# Patient Record
Sex: FEMALE | Race: BLACK OR AFRICAN AMERICAN | NOT HISPANIC OR LATINO | Employment: OTHER | URBAN - METROPOLITAN AREA
[De-identification: names, ages, dates, MRNs, and addresses within clinical notes are randomized per-mention and may not be internally consistent; named-entity substitution may affect disease eponyms.]

---

## 2019-05-23 ENCOUNTER — OFFICE VISIT CONVERTED (OUTPATIENT)
Dept: UROLOGY | Facility: CLINIC | Age: 39
End: 2019-05-23
Attending: UROLOGY

## 2019-05-23 ENCOUNTER — CONVERSION ENCOUNTER (OUTPATIENT)
Dept: UROLOGY | Facility: CLINIC | Age: 39
End: 2019-05-23

## 2019-05-28 ENCOUNTER — HOSPITAL ENCOUNTER (OUTPATIENT)
Dept: SURGERY | Facility: HOSPITAL | Age: 39
Setting detail: HOSPITAL OUTPATIENT SURGERY
Discharge: HOME | End: 2019-05-28
Attending: UROLOGY

## 2019-09-10 ENCOUNTER — OFFICE VISIT CONVERTED (OUTPATIENT)
Dept: UROLOGY | Facility: CLINIC | Age: 39
End: 2019-09-10
Attending: UROLOGY

## 2020-08-17 ENCOUNTER — HOSPITAL ENCOUNTER (OUTPATIENT)
Dept: PHYSICAL THERAPY | Facility: CLINIC | Age: 40
Setting detail: RECURRING SERIES
Discharge: HOME | End: 2020-12-04
Attending: FAMILY MEDICINE

## 2021-01-28 ENCOUNTER — HOSPITAL ENCOUNTER (OUTPATIENT)
Dept: OTHER | Facility: HOSPITAL | Age: 41
Discharge: HOME | End: 2021-01-28
Attending: OBSTETRICS & GYNECOLOGY

## 2021-05-15 VITALS
HEIGHT: 62 IN | TEMPERATURE: 98.5 F | SYSTOLIC BLOOD PRESSURE: 132 MMHG | HEART RATE: 89 BPM | BODY MASS INDEX: 53.92 KG/M2 | WEIGHT: 293 LBS | DIASTOLIC BLOOD PRESSURE: 89 MMHG

## 2021-05-15 VITALS — SYSTOLIC BLOOD PRESSURE: 131 MMHG | DIASTOLIC BLOOD PRESSURE: 71 MMHG | HEART RATE: 100 BPM | TEMPERATURE: 98.3 F

## 2021-08-29 ENCOUNTER — HOSPITAL ENCOUNTER (EMERGENCY)
Facility: HOSPITAL | Age: 41
Discharge: HOME | End: 2021-08-29
Attending: EMERGENCY MEDICINE | Admitting: EMERGENCY MEDICINE

## 2021-08-29 VITALS
BODY MASS INDEX: 51.91 KG/M2 | SYSTOLIC BLOOD PRESSURE: 140 MMHG | HEIGHT: 63 IN | WEIGHT: 293 LBS | RESPIRATION RATE: 19 BRPM | HEART RATE: 103 BPM | OXYGEN SATURATION: 95 % | DIASTOLIC BLOOD PRESSURE: 93 MMHG | TEMPERATURE: 99.6 F

## 2021-08-29 DIAGNOSIS — Z20.822 EXPOSURE TO COVID-19 VIRUS: ICD-10-CM

## 2021-08-29 DIAGNOSIS — Z20.822 SUSPECTED COVID-19 VIRUS INFECTION: Primary | ICD-10-CM

## 2021-08-29 PROCEDURE — U0003 INFECTIOUS AGENT DETECTION BY NUCLEIC ACID (DNA OR RNA); SEVERE ACUTE RESPIRATORY SYNDROME CORONAVIRUS 2 (SARS-COV-2) (CORONAVIRUS DISEASE [COVID-19]), AMPLIFIED PROBE TECHNIQUE, MAKING USE OF HIGH THROUGHPUT TECHNOLOGIES AS DESCRIBED BY CMS-2020-01-R: HCPCS | Performed by: NURSE PRACTITIONER

## 2021-08-29 PROCEDURE — C9803 HOPD COVID-19 SPEC COLLECT: HCPCS | Performed by: NURSE PRACTITIONER

## 2021-08-29 PROCEDURE — 99283 EMERGENCY DEPT VISIT LOW MDM: CPT

## 2021-08-30 LAB — SARS-COV-2 RNA RESP QL NAA+PROBE: NOT DETECTED

## 2022-02-15 ENCOUNTER — HOSPITAL ENCOUNTER (OUTPATIENT)
Dept: HOSPITAL 49 - FAS | Age: 42
Discharge: HOME | End: 2022-02-15
Attending: ORTHOPAEDIC SURGERY
Payer: COMMERCIAL

## 2022-02-15 VITALS — BODY MASS INDEX: 53.92 KG/M2 | HEIGHT: 62 IN | WEIGHT: 293 LBS

## 2022-02-15 DIAGNOSIS — G47.30: ICD-10-CM

## 2022-02-15 DIAGNOSIS — Z88.5: ICD-10-CM

## 2022-02-15 DIAGNOSIS — I10: ICD-10-CM

## 2022-02-15 DIAGNOSIS — G56.03: Primary | ICD-10-CM

## 2022-02-15 LAB
HCG (URINE) SCREEN: NEGATIVE
HCT: 39.8 % (ref 37–47)
HGB BLD-MCNC: 12.2 G/DL (ref 12.5–16)
MCH RBC QN AUTO: 27.2 PG (ref 25–31)
MCHC RBC AUTO-ENTMCNC: 30.7 G/DL (ref 32–36)
MCV: 88.8 FL (ref 78–100)
MPV: 10.8 FL (ref 6–9.5)
PLT: 339 K/UL (ref 150–400)
RBC: 4.48 M/UL (ref 4.2–5.4)
RDW: 12.1 % (ref 11.5–14)
WBC: 7 K/UL (ref 4–10.5)

## 2022-08-23 ENCOUNTER — CONSULT (OUTPATIENT)
Dept: BARIATRICS/WEIGHT MGMT | Facility: CLINIC | Age: 42
End: 2022-08-23

## 2022-08-23 VITALS
TEMPERATURE: 97.7 F | RESPIRATION RATE: 18 BRPM | SYSTOLIC BLOOD PRESSURE: 149 MMHG | WEIGHT: 293 LBS | HEIGHT: 62 IN | HEART RATE: 90 BPM | BODY MASS INDEX: 53.92 KG/M2 | DIASTOLIC BLOOD PRESSURE: 97 MMHG

## 2022-08-23 DIAGNOSIS — G47.33 OSA (OBSTRUCTIVE SLEEP APNEA): ICD-10-CM

## 2022-08-23 DIAGNOSIS — E66.01 MORBID OBESITY WITH BMI OF 60.0-69.9, ADULT: Primary | ICD-10-CM

## 2022-08-23 DIAGNOSIS — E78.5 BORDERLINE HYPERLIPIDEMIA: ICD-10-CM

## 2022-08-23 DIAGNOSIS — I10 BENIGN ESSENTIAL HYPERTENSION: ICD-10-CM

## 2022-08-23 DIAGNOSIS — Z01.818 PRE-OP EVALUATION: ICD-10-CM

## 2022-08-23 DIAGNOSIS — K21.9 GASTROESOPHAGEAL REFLUX DISEASE, UNSPECIFIED WHETHER ESOPHAGITIS PRESENT: ICD-10-CM

## 2022-08-23 PROBLEM — R60.9 EDEMA: Status: ACTIVE | Noted: 2020-07-18

## 2022-08-23 PROBLEM — Z71.3 DIETARY COUNSELING: Status: ACTIVE | Noted: 2022-08-23

## 2022-08-23 PROBLEM — Z86.718 HISTORY OF DVT (DEEP VEIN THROMBOSIS): Status: ACTIVE | Noted: 2022-08-23

## 2022-08-23 PROBLEM — M25.50 MULTIPLE JOINT PAIN: Status: ACTIVE | Noted: 2022-08-23

## 2022-08-23 PROCEDURE — 99205 OFFICE O/P NEW HI 60 MIN: CPT | Performed by: NURSE PRACTITIONER

## 2022-08-23 RX ORDER — POTASSIUM CHLORIDE 750 MG/1
10 TABLET, FILM COATED, EXTENDED RELEASE ORAL AS NEEDED
COMMUNITY
End: 2023-03-21

## 2022-08-23 RX ORDER — FUROSEMIDE 40 MG/1
40 TABLET ORAL EVERY OTHER DAY
COMMUNITY
Start: 2022-08-04 | End: 2023-03-19

## 2022-08-23 RX ORDER — BENAZEPRIL HYDROCHLORIDE AND HYDROCHLOROTHIAZIDE 20; 12.5 MG/1; MG/1
2 TABLET ORAL DAILY
COMMUNITY
Start: 2022-08-04 | End: 2022-12-22 | Stop reason: HOSPADM

## 2022-08-23 NOTE — PROGRESS NOTES
[facility] BARIATRIC SURGERY  [address] [address]  [phone]  [address] [address]  [phone]  Dept: [phone]  8/23/2022      Yancy Hernandez.  [id number]  [id number]  1980  female      Chief Complaint of weight gain; unable to maintain weight loss    History of Present Illness:   Yancy is a 42 y.o. female who presents today for evaluation, education and consultation regarding weight loss surgery. The patient is interested in the sleeve gastrectomy.      Diet History:Yancy has been overweight for at least 18 years, has been 35 pounds or more overweight for at least 18 years, has been 100 pounds or more overweight for 15 or more years and started dieting at age 20.  The most weight Yancy lost was 20 pounds on keto and maintained the weight loss for 4 months. Yancy describes her eating habits as snacker and sweets eater. Yancy Hernandez has tried Weight Watchers, reduced calorie, exercising, medications and keto among others with success of losing up to 20 pounds, but in each instance regained the weight.     See dietician documentation for complete history.    Bariatric Surgery Evaluation: The patient is being seen for an initial visit for bariatric surgery evaluation.     Bariatric Co-morbidities:  sleep apnea, hypertension, dyslipidemia, knee pain, GERD, edema and depression    Patient Active Problem List   Diagnosis   • Vitamin D deficiency   • Nonalcoholic fatty liver disease   • Iron deficiency anemia   • Edema   • Benign essential hypertension   • Morbid obesity with BMI of 60.0-69.9, adult (HCC)   • Dietary counseling   • Pre-op evaluation   • HORTENCIA (obstructive sleep apnea)   • History of DVT (deep vein thrombosis)   • GERD (gastroesophageal reflux disease)   • Multiple joint pain   • Borderline hyperlipidemia       Past Medical History:   Diagnosis Date   • Arthritis    • Carpal tunnel  syndrome    • Depression    • Helicobacter pylori infection 11/27/2015   • Hypertension    • Migraine    • Obesity    • Urinary incontinence        Past Surgical History:   Procedure Laterality Date   • BILATERAL BREAST REDUCTION     • DENTAL PROCEDURE     • DILATATION AND CURETTAGE     • ENDOMETRIAL ABLATION     • LAPAROSCOPIC CHOLECYSTECTOMY     • TONSILLECTOMY     • TUBAL ABDOMINAL LIGATION         Allergies   Allergen Reactions   • Codeine Unknown - High Severity   • Hydrocodone-Acetaminophen Hallucinations         Current Outpatient Medications:   •  benazepril-hydrochlorthiazide (LOTENSIN HCT) 20-12.5 MG per tablet, Take 2 tablets by mouth Daily., Disp: , Rfl:   •  DICLOFENAC PO, Take  by mouth., Disp: , Rfl:   •  Famotidine (PEPCID PO), Take  by mouth As Needed., Disp: , Rfl:   •  furosemide (LASIX) 40 MG tablet, Take 40 mg by mouth Daily., Disp: , Rfl:   •  potassium chloride 10 MEQ CR tablet, Take 10 mEq by mouth., Disp: , Rfl:     Social History     Socioeconomic History   • Marital status:    • Number of children: 8   Tobacco Use   • Smoking status: Never Smoker   • Smokeless tobacco: Never Used   Substance and Sexual Activity   • Alcohol use: Not Currently     Comment: rare   • Drug use: Never   • Sexual activity: Defer       Family History   Problem Relation Age of Onset   • Obesity Mother    • Hypertension Mother    • Hypertension Father    • Sleep apnea Father    • Sleep apnea Sister    • Hypertension Sister          Review of Systems:  Review of Systems   Musculoskeletal: Positive for arthralgias.   All other systems reviewed and are negative.      Physical Exam:  Vital Signs:  Weight: (!) 156 kg (344 lb)   Body mass index is 62.03 kg/m².  Temp: 97.7 °F (36.5 °C)   Heart Rate: 90   BP: 149/97     Physical Exam  Vitals reviewed.   Constitutional:       Appearance: Normal appearance. She is well-developed. She is obese.   HENT:      Head: Normocephalic and atraumatic.   Cardiovascular:      Rate  and Rhythm: Normal rate.   Pulmonary:      Effort: Pulmonary effort is normal.      Breath sounds: Normal breath sounds.   Abdominal:      General: Bowel sounds are normal. There is no distension.      Palpations: Abdomen is soft.      Tenderness: There is no abdominal tenderness.   Musculoskeletal:         General: Normal range of motion.   Skin:     General: Skin is warm and dry.   Neurological:      Mental Status: She is alert and oriented to person, place, and time.   Psychiatric:         Behavior: Behavior normal.         Thought Content: Thought content normal.         Judgment: Judgment normal.            Assessment:         Yancy Hernandez is a 42 y.o. year old female with medically complicated severe obesity. Weight: (!) 156 kg (344 lb), Body mass index is 62.03 kg/m². and weight related problems including sleep apnea, hypertension, dyslipidemia, knee pain, GERD, edema and depression.    I explained in detail, potential surgical options of interest to the patient including the RNY gastric bypass, sleeve gastrectomy, and gastric band while considering the patient's medical history. At this time, the patient expressed interest in the sleeve gastrectomy.  All of those procedures can be performed laparoscopically but there is a chance to convert to open if any technical challenges or complications do occur.  Bariatric surgery is not cosmetic surgery but rather a tool to help a patient make a life-long commitment lifestyle changes including diet, exercise, behavior changes, and taking supplemental vitamins and minerals. The risks, benefits, alternatives, and potential complications of all of the procedures were explained in detail including but not limited to failure to lose weight or gain weight and change in body image, metabolic complications. The patient was informed that surgery is a tool and requires lifestyle change including dietary modification to be successful. The patient was made aware of the need  for vitamin supplementation after surgery due to the risk of deficiencies including but not limited to calcium, thiamine, vitamin B12, folate, iron, and anemia.    The patient was advised to start a high protein, low fat and low carbohydrate diet. The patient was given individualized information by our dietician along with handouts. The patient was encouraged to start routine exercise including but not limited to 150 minutes per week. The patient received a resistance band along with a handout of exercises.     The patient was given information regarding the DEYSI educational video. DEYSI is an internet based educational video which explains the surgical procedure and answers basic questions regarding the procedure. The patient was provided with instructions and a password to watch the video.    Due to the patient's BMI, history and co-morbidities they are at a high risk for surgery and will obtain the following:  -The patient has been advised that a letter of medical support and a history and physical must be obtained from her primary care physician. The patient was given a copy of a sample form, that will suffice as their letter to take to their primary are provider.  -A referral for pre-operative psychological evaluation was ordered for the patient to evaluate candidacy as well as provide mental health support, should it be warranted before or after surgery.  -Pre-operative testing will be ordered to include: CBC, CMP,TSH and HgbA1C will be drawn, CXR, EKG. Previous results of testing were reviewed including cbc, cmp from last year.     -Yancy Hernandez will be set up for a pre-operative diagnostic esophagogastroduodenoscopy with biopsy for evaluation. The risks and benefits of the procedure were discussed with the patient in detail and all questions were answered.  Possibility of perforation, bleeding, aspiration, anoxic brain injury, respiratory and/or cardiac arrest and death were discussed. The patient  received handouts regarding her instructions and all questions were answered.    The patient understands the surgical procedures and the different surgical options that are available.  She understands the lifestyle changes that would be required after surgery and has agreed to participate in a pre-operative and postoperative weight management program.  She also expressed understanding of possible risks, had several questions answered and desires to proceed.    I think she is a good candidate for this surgery, and is interested in a sleeve gastrectomy.    Encounter Diagnoses   Name Primary?   • Morbid obesity with BMI of 60.0-69.9, adult (HCC) Yes   • Benign essential hypertension    • HORTENCIA (obstructive sleep apnea)    • Gastroesophageal reflux disease, unspecified whether esophagitis present    • Borderline hyperlipidemia    • Pre-op evaluation        Plan:    The consultation plan was reviewed with the patient.  Patient will have evaluations and follow up with bariatric dieticians and a psychologist before undergoing a multidisciplinary review of her candidacy.  We also discussed the weight loss requirement and rationale, as well as other program requirements to ensure the safest approach to surgery. We spent time discussing different surgical procedures and plan of care throughout their lifespan to ensure long term success in achieving and maintaining a healthier weight. Patient will proceed with preoperative lab work, radiology, and endoscopy after obtaining agreed upon clearances and letter of support from their primary care provider.     As this patient is a female of childbearing age, she was counseled regarding conception and pregnancy after surgery. Patient was advised that conception and pregnancy in the first 18 months post surgery is not advised due to increased metabolic demands required during pregnancy as well as increased risk of nutrient and vitamin deficiencies which could jeopardize fetal  development and birth weight.     Total encounter exceeded 60 minutes including reviewing their chart/outside medical records/previous visits, face to face time obtaining medical history and physical, reviewing surgical options and answering any questions, discussing pre-operative plan and requirements along with care coordination.         Neelam Marinelli, APRN  8/23/2022

## 2022-08-24 NOTE — PROGRESS NOTES
"Metabolic and Bariatric Surgery Nutrition Assessment    Patient Name: Yancy Hernandez    YOB: 1980   Age: 42 y.o.  Sex: female  MRN: [id number]     Assessment Date: 8/23/22    Procedure considering: sleeve    Anthropometric Measurements  Height: 62.44\"          Current weight: 344 lbs   BMI: 62.03 kg/m²    Patient Goals and Expectations                        Patient's stated weight goal: 174 lbs  Reasons for desiring weight loss: able to be more active with family and improved health    Medical History  Pertinent diagnosis/problems: hypertension, dyslipidemia, fatty liver disease, GERD, sleep apnea, edema, knee pain and depression     Weight History  Highest adult weight: 344 lbs                              Lowest adult weight: 174 lbs  Onset of obesity: adulthood  Possible triggers or life events that may have led to weight gain: lifestyle, stress     Previous Weight Loss Efforts  Patient has tried to lose weight in the past including Weight Watchers, calorie counting, TOPS, low carbohydrate, Herbalife, Slim Fast, keto, intermittent fasting, prescription medications and exercise.   Patient has lost up to 20 lbs on diets, but was unable to maintain this long term.     Diet History  Patient is eating 2 meals and 2 snacks daily.  is  and they eat a lot of rice, tortillas and Mexican style food.   Current Intake  Breakfast: none  Lunch: sausage sandwich  Dinner: mole chicken, rice   Snacks: fruit, chocolate   Beverages: sweet tea, Sprite   Eating out: 3 times per week  Vitamins/supplements: none  Diet restrictions or food allergies: none    Patient identifies problem areas to be eating out of boredom, eating in response to stress, emotional eating, sweets cravings, skipping meals and inactivity.     Physical Activity  Work-related activity: light  Planned exercise: none   Barriers to activity: knee pain     Nutrition Intervention  Pre and post op nutrition education and coaching for " behavior change completed. Program materials provided. Emphasized the importance of taking in at least 70 g protein and 64 oz fluid daily. Discussed personal habits and lifestyle behaviors that may influence weight loss efforts. Self monitoring strategies such as keeping a food journal were encouraged. Patient verbalized understanding and questions were answered.  Short term goals: decrease, eliminate high calorie and carbonated beverages, one high carbohydrate food per meal and serve high calorie sauces on the side.     Recommendations/Plan  Patient will be evaluated by multidisciplinary team before undergoing a review of their candidacy.  Additional nutrition counseling available and encouraged both pre and post op.   Patient demonstrated a good comprehension of diet requirements and commitment to make healthy changes.   Yancy Hernandez appears to be a suitable candidate for bariatric surgery from a nutritional standpoint.      Lisandra Hagen RD  08/24/22  14:41 EDT

## 2022-08-26 ENCOUNTER — PATIENT ROUNDING (BHMG ONLY) (OUTPATIENT)
Dept: BARIATRICS/WEIGHT MGMT | Facility: CLINIC | Age: 42
End: 2022-08-26

## 2022-08-26 NOTE — PROGRESS NOTES
Good afternoon,    My name is Shani Hurley, I am the Practice Manager for [facility] Bariatric Surgery.      I want to officially welcome you to our practice and ask about your recent visit.      If you could tell me about your recent visit with us. What things went well?      We're always looking for ways to make our patients experiences even better. Do you have recommendations on ways we may improve?      I appreciate you taking the time to answer a few questions today.      Thank you, and have a great day!        Message was sent to the patient via [employer] for PATIENT ROUNDING for [facility].

## 2022-09-01 ENCOUNTER — HOSPITAL ENCOUNTER (EMERGENCY)
Facility: HOSPITAL | Age: 42
Discharge: HOME | End: 2022-09-01
Attending: EMERGENCY MEDICINE | Admitting: EMERGENCY MEDICINE

## 2022-09-01 ENCOUNTER — APPOINTMENT (OUTPATIENT)
Dept: GENERAL RADIOLOGY | Facility: HOSPITAL | Age: 42
End: 2022-09-01

## 2022-09-01 ENCOUNTER — APPOINTMENT (OUTPATIENT)
Dept: CARDIOLOGY | Facility: HOSPITAL | Age: 42
End: 2022-09-01

## 2022-09-01 ENCOUNTER — HOSPITAL ENCOUNTER (EMERGENCY)
Facility: HOSPITAL | Age: 42
Discharge: LEFT BEFORE DISCHARGE | End: 2022-09-01

## 2022-09-01 VITALS
DIASTOLIC BLOOD PRESSURE: 98 MMHG | HEIGHT: 62 IN | TEMPERATURE: 98.5 F | SYSTOLIC BLOOD PRESSURE: 147 MMHG | HEART RATE: 76 BPM | OXYGEN SATURATION: 98 % | BODY MASS INDEX: 53.92 KG/M2 | WEIGHT: 293 LBS | RESPIRATION RATE: 16 BRPM

## 2022-09-01 DIAGNOSIS — R22.42 LOCALIZED SWELLING OF LEFT LOWER LEG: Primary | ICD-10-CM

## 2022-09-01 LAB
ALBUMIN SERPL-MCNC: 4.2 G/DL (ref 3.5–5.2)
ALBUMIN/GLOB SERPL: 1.1 G/DL
ALP SERPL-CCNC: 57 U/L (ref 39–117)
ALT SERPL W P-5'-P-CCNC: 18 U/L (ref 1–33)
ANION GAP SERPL CALCULATED.3IONS-SCNC: 7.8 MMOL/L (ref 5–15)
APTT PPP: 27.4 SECONDS (ref 24.2–34.2)
AST SERPL-CCNC: 15 U/L (ref 1–32)
BASOPHILS # BLD AUTO: 0.03 10*3/MM3 (ref 0–0.2)
BASOPHILS NFR BLD AUTO: 0.5 % (ref 0–1.5)
BH CV LOWER VASCULAR LEFT COMMON FEMORAL AUGMENT: NORMAL
BH CV LOWER VASCULAR LEFT COMMON FEMORAL COMPRESS: NORMAL
BH CV LOWER VASCULAR LEFT COMMON FEMORAL PHASIC: NORMAL
BH CV LOWER VASCULAR LEFT COMMON FEMORAL SPONT: NORMAL
BH CV LOWER VASCULAR LEFT DISTAL FEMORAL AUGMENT: NORMAL
BH CV LOWER VASCULAR LEFT DISTAL FEMORAL COMPETENT: NORMAL
BH CV LOWER VASCULAR LEFT DISTAL FEMORAL PHASIC: NORMAL
BH CV LOWER VASCULAR LEFT DISTAL FEMORAL SPONT: NORMAL
BH CV LOWER VASCULAR LEFT GASTRONEMIUS COMPRESS: NORMAL
BH CV LOWER VASCULAR LEFT GREATER SAPH AK COMPRESS: NORMAL
BH CV LOWER VASCULAR LEFT MID FEMORAL AUGMENT: NORMAL
BH CV LOWER VASCULAR LEFT MID FEMORAL COMPETENT: NORMAL
BH CV LOWER VASCULAR LEFT MID FEMORAL COMPRESS: NORMAL
BH CV LOWER VASCULAR LEFT MID FEMORAL PHASIC: NORMAL
BH CV LOWER VASCULAR LEFT MID FEMORAL SPONT: NORMAL
BH CV LOWER VASCULAR LEFT PERONEAL COMPRESS: NORMAL
BH CV LOWER VASCULAR LEFT POPLITEAL AUGMENT: NORMAL
BH CV LOWER VASCULAR LEFT POPLITEAL COMPETENT: NORMAL
BH CV LOWER VASCULAR LEFT POPLITEAL COMPRESS: NORMAL
BH CV LOWER VASCULAR LEFT POPLITEAL PHASIC: NORMAL
BH CV LOWER VASCULAR LEFT POPLITEAL SPONT: NORMAL
BH CV LOWER VASCULAR LEFT POSTERIOR TIBIAL AUGMENT: NORMAL
BH CV LOWER VASCULAR LEFT POSTERIOR TIBIAL COMPRESS: NORMAL
BH CV LOWER VASCULAR LEFT PROXIMAL FEMORAL COMPRESS: NORMAL
BH CV LOWER VASCULAR RIGHT COMMON FEMORAL AUGMENT: NORMAL
BH CV LOWER VASCULAR RIGHT COMMON FEMORAL COMPRESS: NORMAL
BH CV LOWER VASCULAR RIGHT COMMON FEMORAL PHASIC: NORMAL
BH CV LOWER VASCULAR RIGHT COMMON FEMORAL SPONT: NORMAL
BILIRUB SERPL-MCNC: 0.4 MG/DL (ref 0–1.2)
BUN SERPL-MCNC: 10 MG/DL (ref 6–20)
BUN/CREAT SERPL: 10.6 (ref 7–25)
CALCIUM SPEC-SCNC: 9.1 MG/DL (ref 8.6–10.5)
CHLORIDE SERPL-SCNC: 102 MMOL/L (ref 98–107)
CO2 SERPL-SCNC: 30.2 MMOL/L (ref 22–29)
CREAT SERPL-MCNC: 0.94 MG/DL (ref 0.57–1)
DEPRECATED RDW RBC AUTO: 41.3 FL (ref 37–54)
EGFRCR SERPLBLD CKD-EPI 2021: 77.9 ML/MIN/1.73
EOSINOPHIL # BLD AUTO: 0.29 10*3/MM3 (ref 0–0.4)
EOSINOPHIL NFR BLD AUTO: 4.4 % (ref 0.3–6.2)
ERYTHROCYTE [DISTWIDTH] IN BLOOD BY AUTOMATED COUNT: 12.7 % (ref 12.3–15.4)
ERYTHROCYTE [SEDIMENTATION RATE] IN BLOOD: 40 MM/HR (ref 0–20)
GLOBULIN UR ELPH-MCNC: 3.8 GM/DL
GLUCOSE SERPL-MCNC: 96 MG/DL (ref 65–99)
HCT VFR BLD AUTO: 36.4 % (ref 34–46.6)
HGB BLD-MCNC: 11.4 G/DL (ref 12–15.9)
IMM GRANULOCYTES # BLD AUTO: 0.02 10*3/MM3 (ref 0–0.05)
IMM GRANULOCYTES NFR BLD AUTO: 0.3 % (ref 0–0.5)
INR PPP: 0.94 (ref 0.86–1.15)
LYMPHOCYTES # BLD AUTO: 2.11 10*3/MM3 (ref 0.7–3.1)
LYMPHOCYTES NFR BLD AUTO: 32 % (ref 19.6–45.3)
MAXIMAL PREDICTED HEART RATE: 178 BPM
MCH RBC QN AUTO: 27.7 PG (ref 26.6–33)
MCHC RBC AUTO-ENTMCNC: 31.3 G/DL (ref 31.5–35.7)
MCV RBC AUTO: 88.6 FL (ref 79–97)
MONOCYTES # BLD AUTO: 0.47 10*3/MM3 (ref 0.1–0.9)
MONOCYTES NFR BLD AUTO: 7.1 % (ref 5–12)
NEUTROPHILS NFR BLD AUTO: 3.67 10*3/MM3 (ref 1.7–7)
NEUTROPHILS NFR BLD AUTO: 55.7 % (ref 42.7–76)
NRBC BLD AUTO-RTO: 0 /100 WBC (ref 0–0.2)
PLATELET # BLD AUTO: 317 10*3/MM3 (ref 140–450)
PMV BLD AUTO: 10.4 FL (ref 6–12)
POTASSIUM SERPL-SCNC: 3.5 MMOL/L (ref 3.5–5.2)
PROT SERPL-MCNC: 8 G/DL (ref 6–8.5)
PROTHROMBIN TIME: 12.7 SECONDS (ref 11.8–14.9)
RBC # BLD AUTO: 4.11 10*6/MM3 (ref 3.77–5.28)
SODIUM SERPL-SCNC: 140 MMOL/L (ref 136–145)
STRESS TARGET HR: 151 BPM
WBC NRBC COR # BLD: 6.59 10*3/MM3 (ref 3.4–10.8)

## 2022-09-01 PROCEDURE — 85025 COMPLETE CBC W/AUTO DIFF WBC: CPT | Performed by: EMERGENCY MEDICINE

## 2022-09-01 PROCEDURE — 93971 EXTREMITY STUDY: CPT

## 2022-09-01 PROCEDURE — 85730 THROMBOPLASTIN TIME PARTIAL: CPT | Performed by: EMERGENCY MEDICINE

## 2022-09-01 PROCEDURE — 85652 RBC SED RATE AUTOMATED: CPT | Performed by: EMERGENCY MEDICINE

## 2022-09-01 PROCEDURE — 93971 EXTREMITY STUDY: CPT | Performed by: SURGERY

## 2022-09-01 PROCEDURE — 80053 COMPREHEN METABOLIC PANEL: CPT | Performed by: EMERGENCY MEDICINE

## 2022-09-01 PROCEDURE — 99211 OFF/OP EST MAY X REQ PHY/QHP: CPT

## 2022-09-01 PROCEDURE — 85610 PROTHROMBIN TIME: CPT | Performed by: EMERGENCY MEDICINE

## 2022-09-01 PROCEDURE — 73590 X-RAY EXAM OF LOWER LEG: CPT

## 2022-09-01 PROCEDURE — 99283 EMERGENCY DEPT VISIT LOW MDM: CPT

## 2022-09-01 RX ORDER — IBUPROFEN 600 MG/1
600 TABLET ORAL EVERY 8 HOURS PRN
Qty: 21 TABLET | Refills: 0 | Status: SHIPPED | OUTPATIENT
Start: 2022-09-01 | End: 2022-09-08

## 2022-09-01 NOTE — ED PROVIDER NOTES
Time: 3:25 PM EDT  Arrived by: private car  Chief Complaint: leg pain  History provided by: Pt  History is limited by: N/A     History of Present Illness:  Patient is a 42 y.o. year old female that presents to the emergency department with left lower leg pain and swelling that began last Monday. Pt reports feeling a knot in her leg and went to [facility] to get it seen on Tuesday. Pt was given antibiotics and told to go to the ER if it got bigger. Pt notes the knot was 3 cm on Tuesday and she visibly sees that it is now bigger. Pt notes the area is warm. Pt denies fever. Pt is not on any birth control and has not had any recent travel on an airplane. Pt has a hx of blood clots in her legs and was treated with Eliquis. Pt does not have any known hx of cancer. Pt was walking today and felt two pinches in her heart area.         Similar Symptoms Previously: yes  Recently seen: yes      Patient Care Team  Primary Care Provider: Emmy Díaz MD    Past Medical History:     Allergies   Allergen Reactions   • Codeine Unknown - High Severity   • Hydrocodone-Acetaminophen Hallucinations     Past Medical History:   Diagnosis Date   • Arthritis    • Carpal tunnel syndrome    • Depression    • Helicobacter pylori infection 11/27/2015   • Hypertension    • Migraine    • Obesity    • Urinary incontinence      Past Surgical History:   Procedure Laterality Date   • BILATERAL BREAST REDUCTION     • DENTAL PROCEDURE     • DILATATION AND CURETTAGE     • ENDOMETRIAL ABLATION     • LAPAROSCOPIC CHOLECYSTECTOMY     • TONSILLECTOMY     • TUBAL ABDOMINAL LIGATION       Family History   Problem Relation Age of Onset   • Obesity Mother    • Hypertension Mother    • Hypertension Father    • Sleep apnea Father    • Sleep apnea Sister    • Hypertension Sister        Home Medications:  Prior to Admission medications    Medication Sig Start Date End Date Taking? Authorizing Provider   benazepril-hydrochlorthiazide (LOTENSIN HCT) 20-12.5 MG  "per tablet Take 2 tablets by mouth Daily. 8/4/22   Douglas Cedillo MD   DICLOFENAC PO Take  by mouth.    Douglas Cedillo MD   Famotidine (PEPCID PO) Take  by mouth As Needed.    Douglas Cedillo MD   furosemide (LASIX) 40 MG tablet Take 40 mg by mouth Daily. 8/4/22   Douglas Cedillo MD   methylPREDNISolone (MEDROL) 4 MG dose pack Take as directed on package instructions. 8/23/22   Haile Saul MD   potassium chloride 10 MEQ CR tablet Take 10 mEq by mouth.    Douglas Cedillo MD        Social History:   Social History     Tobacco Use   • Smoking status: Never Smoker   • Smokeless tobacco: Never Used   Vaping Use   • Vaping Use: Never used   Substance Use Topics   • Alcohol use: Not Currently     Comment: rare   • Drug use: Never         Review of Systems:  Review of Systems   Constitutional: Negative for chills, diaphoresis and fever.   HENT: Negative for congestion, postnasal drip, rhinorrhea and sore throat.    Eyes: Negative for photophobia.   Respiratory: Negative for cough, chest tightness and shortness of breath.    Cardiovascular: Negative for chest pain, palpitations and leg swelling.   Gastrointestinal: Negative for abdominal pain, diarrhea, nausea and vomiting.   Genitourinary: Negative for difficulty urinating, dysuria, flank pain, frequency, hematuria and urgency.   Musculoskeletal: Positive for myalgias. Negative for neck pain and neck stiffness.   Skin: Negative for pallor and rash.   Neurological: Negative for dizziness, syncope, weakness, numbness and headaches.   Hematological: Negative for adenopathy. Does not bruise/bleed easily.   Psychiatric/Behavioral: Negative.         Physical Exam:  /84   Pulse 72   Temp 98.3 °F (36.8 °C) (Oral)   Resp 20   Ht 157.5 cm (62\")   Wt (!) 156 kg (343 lb 14.7 oz)   SpO2 95%   BMI 62.90 kg/m²     Physical Exam  Vitals and nursing note reviewed.   Constitutional:       General: She is not in acute distress.     Appearance: " Normal appearance. She is not ill-appearing, toxic-appearing or diaphoretic.   HENT:      Head: Normocephalic and atraumatic.      Mouth/Throat:      Mouth: Mucous membranes are moist.   Eyes:      Pupils: Pupils are equal, round, and reactive to light.   Cardiovascular:      Rate and Rhythm: Normal rate and regular rhythm.      Pulses: Normal pulses.           Carotid pulses are 2+ on the right side and 2+ on the left side.       Radial pulses are 2+ on the right side and 2+ on the left side.        Femoral pulses are 2+ on the right side and 2+ on the left side.       Popliteal pulses are 2+ on the right side and 2+ on the left side.        Dorsalis pedis pulses are 2+ on the right side and 2+ on the left side.        Posterior tibial pulses are 2+ on the right side and 2+ on the left side.      Heart sounds: Normal heart sounds. No murmur heard.     Comments: Dorsal and pedal pulses are +1  Pulmonary:      Effort: Pulmonary effort is normal. No accessory muscle usage, respiratory distress or retractions.      Breath sounds: Normal breath sounds. No wheezing, rhonchi or rales.   Abdominal:      General: Abdomen is flat. There is no distension.      Palpations: Abdomen is soft. There is no mass.      Tenderness: There is no abdominal tenderness. There is no right CVA tenderness, left CVA tenderness, guarding or rebound.      Comments: No rigidity   Musculoskeletal:         General: No swelling or deformity.      Cervical back: Neck supple. No tenderness.      Right lower leg: No edema.      Left lower leg: Swelling (soft tissue swelling at distal 1/3 of leg) and tenderness (tender to touch) present. No edema.      Comments: Chronic lymphoedema  No calf tenderness in either leg   Skin:     General: Skin is warm and dry.      Capillary Refill: Capillary refill takes less than 2 seconds.      Coloration: Skin is not jaundiced or pale.      Findings: No erythema.      Comments: No fluctuance, erythema, pustule,  crepitus, gas or red streaking on LLE, not warm to touch   Neurological:      General: No focal deficit present.      Mental Status: She is alert and oriented to person, place, and time. Mental status is at baseline.      Sensory: No sensory deficit.      Motor: No weakness.   Psychiatric:         Mood and Affect: Mood normal.         Behavior: Behavior normal.                Medications in the Emergency Department:  Medications - No data to display     Labs  Lab Results (last 24 hours)     Procedure Component Value Units Date/Time    CBC & Differential [[id number]]  (Abnormal) Collected: 09/01/22 1603    Specimen: Blood Updated: 09/01/22 1611    Narrative:      The following orders were created for panel order CBC & Differential.  Procedure                               Abnormality         Status                     ---------                               -----------         ------                     CBC Auto Differential[[id number]]        Abnormal            Final result                 Please view results for these tests on the individual orders.    Comprehensive Metabolic Panel [[id number]]  (Abnormal) Collected: 09/01/22 1603    Specimen: Blood Updated: 09/01/22 1637     Glucose 96 mg/dL      BUN 10 mg/dL      Creatinine 0.94 mg/dL      Sodium 140 mmol/L      Potassium 3.5 mmol/L      Chloride 102 mmol/L      CO2 30.2 mmol/L      Calcium 9.1 mg/dL      Total Protein 8.0 g/dL      Albumin 4.20 g/dL      ALT (SGPT) 18 U/L      AST (SGOT) 15 U/L      Alkaline Phosphatase 57 U/L      Total Bilirubin 0.4 mg/dL      Globulin 3.8 gm/dL      A/G Ratio 1.1 g/dL      BUN/Creatinine Ratio 10.6     Anion Gap 7.8 mmol/L      eGFR 77.9 mL/min/1.73      Comment: National Kidney Foundation and American Society of Nephrology (ASN) Task Force recommended calculation based on the Chronic Kidney Disease Epidemiology Collaboration (CKD-EPI) equation refit without adjustment for race.       Narrative:      GFR Normal >60  Chronic  Kidney Disease <60  Kidney Failure <15      Sedimentation Rate [[id number]]  (Abnormal) Collected: 09/01/22 1603    Specimen: Blood Updated: 09/01/22 1622     Sed Rate 40 mm/hr     Protime-INR [[id number]]  (Normal) Collected: 09/01/22 1603    Specimen: Blood Updated: 09/01/22 1619     Protime 12.7 Seconds      INR 0.94    Narrative:      Suggested Therapeutic Ranges For Oral Anticoagulant Therapy:  Level of Therapy                      INR Target Range  Standard Dose                            2.0-3.0  High Dose                                2.5-3.5  Patients not receiving anticoagulant  Therapy Normal Range                     0.86-1.15    aPTT [[id number]]  (Normal) Collected: 09/01/22 1603    Specimen: Blood Updated: 09/01/22 1619     PTT 27.4 seconds     CBC Auto Differential [[id number]]  (Abnormal) Collected: 09/01/22 1603    Specimen: Blood Updated: 09/01/22 1611     WBC 6.59 10*3/mm3      RBC 4.11 10*6/mm3      Hemoglobin 11.4 g/dL      Hematocrit 36.4 %      MCV 88.6 fL      MCH 27.7 pg      MCHC 31.3 g/dL      RDW 12.7 %      RDW-SD 41.3 fl      MPV 10.4 fL      Platelets 317 10*3/mm3      Neutrophil % 55.7 %      Lymphocyte % 32.0 %      Monocyte % 7.1 %      Eosinophil % 4.4 %      Basophil % 0.5 %      Immature Grans % 0.3 %      Neutrophils, Absolute 3.67 10*3/mm3      Lymphocytes, Absolute 2.11 10*3/mm3      Monocytes, Absolute 0.47 10*3/mm3      Eosinophils, Absolute 0.29 10*3/mm3      Basophils, Absolute 0.03 10*3/mm3      Immature Grans, Absolute 0.02 10*3/mm3      nRBC 0.0 /100 WBC            Imaging:  XR Tibia Fibula 2 View Left    Result Date: 9/1/2022  PROCEDURE: XR TIBIA FIBULA 2 VW LEFT  COMPARISON: None  INDICATIONS: KNOT ON LEFT ANTERIOR DISTAL LOWER LEG, LEFT LOWER LEG PAIN, NO INJURY  FINDINGS:  BONES: Normal.  No significant arthropathy or acute abnormality.  SOFT TISSUES: Negative.  No visible soft tissue swelling.  EFFUSION: None visible.  OTHER: Negative.        No acute osseous process  identified.      ALDO WAKEFIELD MD       Electronically Signed and Approved By: ALDO WAKEFIELD MD on 9/01/2022 at 16:46             Duplex Venous Lower Extremity - Left CAR    Result Date: 9/1/2022  · Normal left lower extremity venous duplex scan.        Procedures:  Procedures    Progress                            Medical Decision Making:  MDM  Number of Diagnoses or Management Options  Diagnosis management comments: The patient had some tenderness and soft tissue swelling located to the lateral aspect of the lower leg.  There was no erythema.  There is no red streaking.  There is no fluctuance.  There is no gas.  There is no crepitance.  There is no abscess.  The patient had a normal white blood cell count.  The patient had a negative duplex Doppler for DVT.  The patient's x-ray demonstrated no acute osseous abnormalities.  There is no air or gas on x-ray as well.  Patient had a normal chemistry.  The patient be placed on high-dose anti-inflammatories.  I will have the patient perform warm compresses.  The patient will follow-up with her doctor on Tuesday for reevaluation.  The patient was given specific instructions on when and why to return to emergency room.  The patient voiced understanding felt comfortable those instructions.               Final diagnoses:   Localized swelling of left lower leg        Disposition:  ED Disposition     ED Disposition   Discharge    Condition   Stable    Comment   --             Documentation assistance provided by Susanna Herrera acting as scribe for Omar Villarreal DO. Information recorded by the scribe was done at my direction and has been verified and validated by me.          Susanna Herrera  09/01/22 1538       Susanna Herrera  09/01/22 1544       Omar Villarreal DO  09/07/22 1920

## 2022-09-01 NOTE — DISCHARGE INSTRUCTIONS
Please help with your doctor on Tuesday to recheck the left leg.    Please perform warm compresses several times a day.    Return to the emergency immediately for fever, shaking chills, redness to the area, fluctuance to the area, increasing pain, red streaking or any new symptoms or concerns with

## 2022-09-06 ENCOUNTER — HOSPITAL ENCOUNTER (EMERGENCY)
Dept: HOSPITAL 49 - FER | Age: 42
LOS: 1 days | Discharge: HOME | End: 2022-09-07
Payer: COMMERCIAL
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
## 2022-09-06 DIAGNOSIS — I10: ICD-10-CM
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
## 2022-09-06 DIAGNOSIS — Z88.5: ICD-10-CM
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
## 2022-09-06 DIAGNOSIS — S62.604A: Primary | ICD-10-CM

## 2022-09-22 ENCOUNTER — LAB (OUTPATIENT)
Dept: LAB | Facility: HOSPITAL | Age: 42
End: 2022-09-22

## 2022-09-22 ENCOUNTER — HOSPITAL ENCOUNTER (OUTPATIENT)
Dept: GENERAL RADIOLOGY | Facility: HOSPITAL | Age: 42
Discharge: HOME | End: 2022-09-22

## 2022-09-22 ENCOUNTER — HOSPITAL ENCOUNTER (OUTPATIENT)
Dept: CARDIOLOGY | Facility: HOSPITAL | Age: 42
Discharge: HOME | End: 2022-09-22

## 2022-09-22 DIAGNOSIS — G47.33 OSA (OBSTRUCTIVE SLEEP APNEA): ICD-10-CM

## 2022-09-22 DIAGNOSIS — Z01.818 PRE-OP EVALUATION: ICD-10-CM

## 2022-09-22 DIAGNOSIS — E78.5 BORDERLINE HYPERLIPIDEMIA: ICD-10-CM

## 2022-09-22 DIAGNOSIS — I10 BENIGN ESSENTIAL HYPERTENSION: ICD-10-CM

## 2022-09-22 DIAGNOSIS — K21.9 GASTROESOPHAGEAL REFLUX DISEASE, UNSPECIFIED WHETHER ESOPHAGITIS PRESENT: ICD-10-CM

## 2022-09-22 DIAGNOSIS — E66.01 MORBID OBESITY WITH BMI OF 60.0-69.9, ADULT: ICD-10-CM

## 2022-09-22 LAB
ALBUMIN SERPL-MCNC: 3.8 G/DL (ref 3.5–5.2)
ALBUMIN/GLOB SERPL: 1.2 G/DL
ALP SERPL-CCNC: 55 U/L (ref 39–117)
ALT SERPL W P-5'-P-CCNC: 15 U/L (ref 1–33)
ANION GAP SERPL CALCULATED.3IONS-SCNC: 7.3 MMOL/L (ref 5–15)
AST SERPL-CCNC: 15 U/L (ref 1–32)
BASOPHILS # BLD AUTO: 0.04 10*3/MM3 (ref 0–0.2)
BASOPHILS NFR BLD AUTO: 0.7 % (ref 0–1.5)
BILIRUB SERPL-MCNC: 0.3 MG/DL (ref 0–1.2)
BUN SERPL-MCNC: 8 MG/DL (ref 6–20)
BUN/CREAT SERPL: 8.4 (ref 7–25)
CALCIUM SPEC-SCNC: 8.6 MG/DL (ref 8.6–10.5)
CHLORIDE SERPL-SCNC: 106 MMOL/L (ref 98–107)
CO2 SERPL-SCNC: 27.7 MMOL/L (ref 22–29)
CREAT SERPL-MCNC: 0.95 MG/DL (ref 0.57–1)
DEPRECATED RDW RBC AUTO: 38.3 FL (ref 37–54)
EGFRCR SERPLBLD CKD-EPI 2021: 76.9 ML/MIN/1.73
EOSINOPHIL # BLD AUTO: 0.28 10*3/MM3 (ref 0–0.4)
EOSINOPHIL NFR BLD AUTO: 4.6 % (ref 0.3–6.2)
ERYTHROCYTE [DISTWIDTH] IN BLOOD BY AUTOMATED COUNT: 12 % (ref 12.3–15.4)
GLOBULIN UR ELPH-MCNC: 3.1 GM/DL
GLUCOSE SERPL-MCNC: 104 MG/DL (ref 65–99)
HBA1C MFR BLD: 5.7 % (ref 4.8–5.6)
HCT VFR BLD AUTO: 34.2 % (ref 34–46.6)
HGB BLD-MCNC: 10.9 G/DL (ref 12–15.9)
IMM GRANULOCYTES # BLD AUTO: 0.01 10*3/MM3 (ref 0–0.05)
IMM GRANULOCYTES NFR BLD AUTO: 0.2 % (ref 0–0.5)
LYMPHOCYTES # BLD AUTO: 1.23 10*3/MM3 (ref 0.7–3.1)
LYMPHOCYTES NFR BLD AUTO: 20.4 % (ref 19.6–45.3)
MCH RBC QN AUTO: 27.6 PG (ref 26.6–33)
MCHC RBC AUTO-ENTMCNC: 31.9 G/DL (ref 31.5–35.7)
MCV RBC AUTO: 86.6 FL (ref 79–97)
MONOCYTES # BLD AUTO: 0.43 10*3/MM3 (ref 0.1–0.9)
MONOCYTES NFR BLD AUTO: 7.1 % (ref 5–12)
NEUTROPHILS NFR BLD AUTO: 4.04 10*3/MM3 (ref 1.7–7)
NEUTROPHILS NFR BLD AUTO: 67 % (ref 42.7–76)
NRBC BLD AUTO-RTO: 0 /100 WBC (ref 0–0.2)
PLATELET # BLD AUTO: 325 10*3/MM3 (ref 140–450)
PMV BLD AUTO: 11.3 FL (ref 6–12)
POTASSIUM SERPL-SCNC: 3.6 MMOL/L (ref 3.5–5.2)
PROT SERPL-MCNC: 6.9 G/DL (ref 6–8.5)
RBC # BLD AUTO: 3.95 10*6/MM3 (ref 3.77–5.28)
SODIUM SERPL-SCNC: 141 MMOL/L (ref 136–145)
TSH SERPL DL<=0.05 MIU/L-ACNC: 1.51 UIU/ML (ref 0.27–4.2)
WBC NRBC COR # BLD: 6.03 10*3/MM3 (ref 3.4–10.8)

## 2022-09-22 PROCEDURE — 36415 COLL VENOUS BLD VENIPUNCTURE: CPT | Performed by: NURSE PRACTITIONER

## 2022-09-22 PROCEDURE — 85025 COMPLETE CBC W/AUTO DIFF WBC: CPT | Performed by: NURSE PRACTITIONER

## 2022-09-22 PROCEDURE — 84443 ASSAY THYROID STIM HORMONE: CPT | Performed by: NURSE PRACTITIONER

## 2022-09-22 PROCEDURE — 71046 X-RAY EXAM CHEST 2 VIEWS: CPT

## 2022-09-22 PROCEDURE — 83036 HEMOGLOBIN GLYCOSYLATED A1C: CPT | Performed by: NURSE PRACTITIONER

## 2022-09-22 PROCEDURE — 80053 COMPREHEN METABOLIC PANEL: CPT | Performed by: NURSE PRACTITIONER

## 2022-09-22 PROCEDURE — 93010 ELECTROCARDIOGRAM REPORT: CPT | Performed by: INTERNAL MEDICINE

## 2022-09-22 PROCEDURE — 93005 ELECTROCARDIOGRAM TRACING: CPT | Performed by: NURSE PRACTITIONER

## 2022-09-23 LAB — QT INTERVAL: 398 MS

## 2022-10-04 ENCOUNTER — TELEPHONE (OUTPATIENT)
Dept: BARIATRICS/WEIGHT MGMT | Facility: CLINIC | Age: 42
End: 2022-10-04

## 2022-10-04 DIAGNOSIS — I10 BENIGN ESSENTIAL HYPERTENSION: ICD-10-CM

## 2022-10-04 DIAGNOSIS — E66.01 MORBID OBESITY WITH BMI OF 60.0-69.9, ADULT: Primary | ICD-10-CM

## 2022-10-04 DIAGNOSIS — Z01.818 PRE-OP EVALUATION: ICD-10-CM

## 2022-10-04 NOTE — TELEPHONE ENCOUNTER
Unable to contact patient  ----- Message from SCOTT Hester sent at 9/27/2022  2:12 PM EDT -----  Patient needs cardiac clearance. Let me know if need order for referral, thx!

## 2022-10-04 NOTE — TELEPHONE ENCOUNTER
Inform about results EKG & need cardio clearance  ----- Message from SCOTT Hester sent at 9/27/2022  2:12 PM EDT -----  Patient needs cardiac clearance. Let me know if need order for referral, thx!

## 2022-10-11 ENCOUNTER — TRANSCRIBE ORDERS (OUTPATIENT)
Dept: ADMINISTRATIVE | Facility: HOSPITAL | Age: 42
End: 2022-10-11

## 2022-10-11 DIAGNOSIS — R07.9 CHEST PAIN, UNSPECIFIED TYPE: Primary | ICD-10-CM

## 2022-10-14 ENCOUNTER — HOSPITAL ENCOUNTER (OUTPATIENT)
Dept: CARDIOLOGY | Facility: HOSPITAL | Age: 42
Discharge: HOME | End: 2022-10-14
Admitting: SPECIALIST

## 2022-10-14 VITALS — HEIGHT: 62 IN | WEIGHT: 293 LBS | BODY MASS INDEX: 53.92 KG/M2

## 2022-10-14 DIAGNOSIS — R07.9 CHEST PAIN, UNSPECIFIED TYPE: ICD-10-CM

## 2022-10-14 PROCEDURE — 93320 DOPPLER ECHO COMPLETE: CPT

## 2022-10-14 PROCEDURE — 93350 STRESS TTE ONLY: CPT

## 2022-10-14 PROCEDURE — 93017 CV STRESS TEST TRACING ONLY: CPT

## 2022-10-14 PROCEDURE — 93325 DOPPLER ECHO COLOR FLOW MAPG: CPT

## 2022-10-14 RX ORDER — DICLOFENAC SODIUM 75 MG/1
75 TABLET, DELAYED RELEASE ORAL 2 TIMES DAILY
COMMUNITY
Start: 2022-10-11 | End: 2022-12-01

## 2022-10-17 LAB
BH CV ECHO MEAS - AO ROOT DIAM: 3.4 CM
BH CV ECHO MEAS - EDV(MOD-SP2): 68 ML
BH CV ECHO MEAS - EDV(MOD-SP4): 68 ML
BH CV ECHO MEAS - EF(MOD-BP): 59.7 %
BH CV ECHO MEAS - ESV(MOD-SP2): 27 ML
BH CV ECHO MEAS - ESV(MOD-SP4): 27 ML
BH CV ECHO MEAS - IVSD: 1 CM
BH CV ECHO MEAS - LA DIMENSION: 3.2 CM
BH CV ECHO MEAS - LAT PEAK E' VEL: 9.9 CM/SEC
BH CV ECHO MEAS - LVIDD: 4.5 CM
BH CV ECHO MEAS - LVIDS: 2.8 CM
BH CV ECHO MEAS - LVOT DIAM: 2 CM
BH CV ECHO MEAS - LVPWD: 1 CM
BH CV ECHO MEAS - MED PEAK E' VEL: 10.2 CM/SEC
BH CV ECHO MEAS - MV A MAX VEL: 77 CM/SEC
BH CV ECHO MEAS - MV DEC TIME: 186 MSEC
BH CV ECHO MEAS - MV E MAX VEL: 117 CM/SEC
BH CV ECHO MEAS - MV E/A: 1.5
BH CV ECHO MEAS - RVDD: 2.4 CM
BH CV ECHO MEAS - TAPSE (>1.6): 2.28 CM
BH CV ECHO MEASUREMENTS AVERAGE E/E' RATIO: 11.64
BH CV IMMEDIATE POST RECOVERY TECH DATA SYMPTOMS: NORMAL
BH CV IMMEDIATE POST TECH DATA BLOOD PRESSURE: NORMAL MMHG
BH CV IMMEDIATE POST TECH DATA HEART RATE: 141 BPM
BH CV NINE MINUTE RECOVERY TECH DATA HEART RATE: 98 BPM
BH CV SIX MINUTE RECOVERY TECH DATA BLOOD PRESSURE: NORMAL
BH CV SIX MINUTE RECOVERY TECH DATA HEART RATE: 112 BPM
BH CV STRESS BP STAGE 1: NORMAL
BH CV STRESS BP STAGE 2: NORMAL
BH CV STRESS DOSE DOBUTAMINE STAGE 1: 10
BH CV STRESS DOSE DOBUTAMINE STAGE 2: 20
BH CV STRESS DURATION MIN STAGE 1: 2
BH CV STRESS DURATION MIN STAGE 2: 5
BH CV STRESS DURATION SEC STAGE 1: 44
BH CV STRESS DURATION SEC STAGE 2: 25
BH CV STRESS GRADE STAGE 1: 10
BH CV STRESS HR STAGE 1: 94
BH CV STRESS HR STAGE 2: 160
BH CV STRESS METS STAGE 1: 5
BH CV STRESS PROTOCOL 1: NORMAL
BH CV STRESS RATE STAGE 1: 47
BH CV STRESS RATE STAGE 2: 94
BH CV STRESS RECOVERY BP: NORMAL MMHG
BH CV STRESS RECOVERY HR: 82 BPM
BH CV STRESS SPEED STAGE 1: 1.7
BH CV STRESS STAGE 1: 1
BH CV STRESS STAGE 2: 2
BH CV THREE MINUTE POST TECH DATA BLOOD PRESSURE: NORMAL MMHG
BH CV THREE MINUTE POST TECH DATA HEART RATE: 124 BPM
BH CV TWELVE MINUTE RECOVERY TECH DATA BLOOD PRESSURE: NORMAL MMHG
BH CV TWELVE MINUTE RECOVERY TECH DATA HEART RATE: 82 BPM
BH CV TWELVE MINUTE RECOVERY TECH DATA SYMPTOMS: NORMAL
IVRT: 63 MSEC
LEFT ATRIUM VOLUME INDEX: 13.1 ML/M2
MAXIMAL PREDICTED HEART RATE: 178 BPM
PERCENT MAX PREDICTED HR: 89.89 %
STRESS BASELINE BP: NORMAL MMHG
STRESS BASELINE HR: 61 BPM
STRESS PERCENT HR: 106 %
STRESS POST PEAK BP: NORMAL MMHG
STRESS POST PEAK HR: 160 BPM
STRESS TARGET HR: 151 BPM

## 2022-10-18 ENCOUNTER — ANESTHESIA (OUTPATIENT)
Dept: GASTROENTEROLOGY | Facility: HOSPITAL | Age: 42
End: 2022-10-18

## 2022-10-18 ENCOUNTER — ANESTHESIA EVENT (OUTPATIENT)
Dept: GASTROENTEROLOGY | Facility: HOSPITAL | Age: 42
End: 2022-10-18

## 2022-10-18 ENCOUNTER — HOSPITAL ENCOUNTER (OUTPATIENT)
Facility: HOSPITAL | Age: 42
Setting detail: HOSPITAL OUTPATIENT SURGERY
Discharge: HOME | End: 2022-10-18
Attending: SURGERY | Admitting: SURGERY

## 2022-10-18 VITALS
TEMPERATURE: 98.5 F | DIASTOLIC BLOOD PRESSURE: 83 MMHG | HEIGHT: 62 IN | WEIGHT: 293 LBS | OXYGEN SATURATION: 97 % | BODY MASS INDEX: 53.92 KG/M2 | RESPIRATION RATE: 15 BRPM | HEART RATE: 74 BPM | SYSTOLIC BLOOD PRESSURE: 125 MMHG

## 2022-10-18 DIAGNOSIS — K21.9 GASTROESOPHAGEAL REFLUX DISEASE, UNSPECIFIED WHETHER ESOPHAGITIS PRESENT: ICD-10-CM

## 2022-10-18 DIAGNOSIS — E66.01 MORBID OBESITY WITH BMI OF 60.0-69.9, ADULT: ICD-10-CM

## 2022-10-18 DIAGNOSIS — Z01.818 PRE-OP EVALUATION: ICD-10-CM

## 2022-10-18 PROBLEM — K44.9 HIATAL HERNIA: Status: ACTIVE | Noted: 2022-10-18

## 2022-10-18 LAB
B-HCG UR QL: NEGATIVE
EXPIRATION DATE: NORMAL
INTERNAL NEGATIVE CONTROL: NEGATIVE
INTERNAL POSITIVE CONTROL: POSITIVE
Lab: NORMAL

## 2022-10-18 PROCEDURE — 81025 URINE PREGNANCY TEST: CPT | Performed by: SURGERY

## 2022-10-18 PROCEDURE — 87081 CULTURE SCREEN ONLY: CPT | Performed by: SURGERY

## 2022-10-18 PROCEDURE — 43239 EGD BIOPSY SINGLE/MULTIPLE: CPT | Performed by: SURGERY

## 2022-10-18 PROCEDURE — 25010000002 PROPOFOL 10 MG/ML EMULSION: Performed by: ANESTHESIOLOGY

## 2022-10-18 RX ORDER — PROPOFOL 10 MG/ML
VIAL (ML) INTRAVENOUS AS NEEDED
Status: DISCONTINUED | OUTPATIENT
Start: 2022-10-18 | End: 2022-10-18 | Stop reason: SURG

## 2022-10-18 RX ORDER — SODIUM CHLORIDE, SODIUM LACTATE, POTASSIUM CHLORIDE, CALCIUM CHLORIDE 600; 310; 30; 20 MG/100ML; MG/100ML; MG/100ML; MG/100ML
30 INJECTION, SOLUTION INTRAVENOUS CONTINUOUS
Status: DISCONTINUED | OUTPATIENT
Start: 2022-10-18 | End: 2022-10-18 | Stop reason: HOSPADM

## 2022-10-18 RX ORDER — LIDOCAINE HYDROCHLORIDE 20 MG/ML
INJECTION, SOLUTION INFILTRATION; PERINEURAL AS NEEDED
Status: DISCONTINUED | OUTPATIENT
Start: 2022-10-18 | End: 2022-10-18 | Stop reason: SURG

## 2022-10-18 RX ADMIN — LIDOCAINE HYDROCHLORIDE 60 MG: 20 INJECTION, SOLUTION INFILTRATION; PERINEURAL at 11:07

## 2022-10-18 RX ADMIN — LIDOCAINE HYDROCHLORIDE 60 MG: 20 INJECTION, SOLUTION INFILTRATION; PERINEURAL at 08:36

## 2022-10-18 RX ADMIN — PROPOFOL 150 MG: 10 INJECTION, EMULSION INTRAVENOUS at 08:36

## 2022-10-18 RX ADMIN — PROPOFOL 200 MCG/KG/MIN: 10 INJECTION, EMULSION INTRAVENOUS at 11:07

## 2022-10-18 RX ADMIN — PROPOFOL 200 MG: 10 INJECTION, EMULSION INTRAVENOUS at 11:07

## 2022-10-18 RX ADMIN — SODIUM CHLORIDE, POTASSIUM CHLORIDE, SODIUM LACTATE AND CALCIUM CHLORIDE 30 ML/HR: 600; 310; 30; 20 INJECTION, SOLUTION INTRAVENOUS at 08:24

## 2022-10-18 NOTE — ANESTHESIA PREPROCEDURE EVALUATION
Anesthesia Evaluation     Patient summary reviewed and Nursing notes reviewed   NPO Solid Status: > 8 hours  NPO Liquid Status: > 6 hours           Airway   Mallampati: III  TM distance: >3 FB  Neck ROM: full  Possible difficult intubation  Dental    (+) edentulous    Pulmonary - normal exam   (+) sleep apnea on CPAP,   Cardiovascular - normal exam    ECG reviewed    (+) hypertension 2 medications or greater, DVT resolved, hyperlipidemia,     ROS comment: Normal stress ECHO 10/22    Neuro/Psych  (+) headaches, numbness, psychiatric history,      ROS Comment: CTS/migraines  GI/Hepatic/Renal/Endo    (+) obesity, morbid obesity, GERD,  liver disease fatty liver disease,     Musculoskeletal     Abdominal   (+) obese,    Substance History      OB/GYN          Other   arthritis,                    Anesthesia Plan    ASA 3     MAC     intravenous induction     Anesthetic plan, risks, benefits, and alternatives have been provided, discussed and informed consent has been obtained with: patient.        CODE STATUS:

## 2022-10-18 NOTE — H&P
Patient Care Team:  Emmy Díaz MD as PCP - General (Emergency Medicine)    Chief complaint Need of preoperative clearance prior to surgery    Subjective     Patient is a 42 y.o. female who is a patient of ours and has undergone our extensive initial evaluation for bariatric surgery and needs to proceed with upper endoscopy for preoperative clearance prior to proceeding with surgery.  Please see the initial history and physical for further detailed information.      Review of Systems   Pertinent items are noted in HPI and no changes since last visit.    Past Medical History:   Diagnosis Date   • Arthritis    • Carpal tunnel syndrome    • Depression    • Helicobacter pylori infection 11/27/2015   • Hyperlipidemia    • Hypertension    • Migraine    • Obesity    • Urinary incontinence      Past Surgical History:   Procedure Laterality Date   • BILATERAL BREAST REDUCTION     • DENTAL PROCEDURE     • DILATATION AND CURETTAGE     • ENDOMETRIAL ABLATION     • LAPAROSCOPIC CHOLECYSTECTOMY     • TONSILLECTOMY     • TUBAL ABDOMINAL LIGATION       Family History   Problem Relation Age of Onset   • Obesity Mother    • Hypertension Mother    • Hypertension Father    • Sleep apnea Father    • Sleep apnea Sister    • Hypertension Sister      Social History     Tobacco Use   • Smoking status: Never   • Smokeless tobacco: Never   Vaping Use   • Vaping Use: Never used   Substance Use Topics   • Alcohol use: Not Currently     Comment: rare   • Drug use: Never     Medications Prior to Admission   Medication Sig Dispense Refill Last Dose   • benazepril-hydrochlorthiazide (LOTENSIN HCT) 20-12.5 MG per tablet Take 2 tablets by mouth Daily.      • diclofenac (VOLTAREN) 75 MG EC tablet Take 1 tablet by mouth 2 (Two) Times a Day.      • Famotidine (PEPCID PO) Take  by mouth As Needed.      • furosemide (LASIX) 40 MG tablet Take 40 mg by mouth Daily.      • potassium chloride 10 MEQ CR tablet Take 10 mEq by mouth.         Allergies:  Codeine and Hydrocodone-acetaminophen    Vital Signs  See PreOp record            Physical Exam:   Heart: RR  Lungs: CTA B  Abd: soft and NT/ND  Ext: no clubbing, cyanosis    Results Review:    I have reviewed the patient's clinical results      Morbid obesity with BMI of 60.0-69.9, adult (HCC)    Pre-op evaluation    GERD (gastroesophageal reflux disease)      The risks and benefits of the procedure were discussed with the patient in detail and all questions were answered.  Possibility of perforation, bleeding, aspiration, anoxic brain injury, respiratory and/or cardiac arrest and death were discussed.  Consent will be signed and witnessed..     Kevin Fuentes MD  10/18/22  07:46 EDT  Time: Approximately 15 minutes was spent with the patient.  All of the patients questions were answered.

## 2022-10-18 NOTE — ANESTHESIA POSTPROCEDURE EVALUATION
Patient: Yancy Hernandez    Procedure Summary     Date: 10/18/22 Room / Location: [facility] ENDOSCOPY 1 /  [facility]    Anesthesia Start: 1102 Anesthesia Stop: 1118    Procedure: ESOPHAGOGASTRODUODENOSCOPY WITH BIOPSY (Esophagus) Diagnosis:       Morbid obesity with BMI of 60.0-69.9, adult (HCC)      Gastroesophageal reflux disease, unspecified whether esophagitis present      Pre-op evaluation      (Morbid obesity with BMI of 60.0-69.9, adult (HCC) [E66.01, Z68.44])      (Gastroesophageal reflux disease, unspecified whether esophagitis present [K21.9])      (Pre-op evaluation [Z01.818])    Surgeons: Kevin Fuentes Jr., MD Provider:     Anesthesia Type: MAC ASA Status: 3          Anesthesia Type: MAC    Vitals  No vitals data found for the desired time range.          Post Anesthesia Care and Evaluation    Patient location during evaluation: PHASE II  Patient participation: complete - patient participated  Level of consciousness: awake and alert  Pain management: adequate    Airway patency: patent  Anesthetic complications: No anesthetic complications  PONV Status: none  Cardiovascular status: acceptable and hemodynamically stable  Respiratory status: acceptable, nonlabored ventilation and spontaneous ventilation  Hydration status: acceptable

## 2022-10-18 NOTE — OP NOTE
Surgeon: Kevin Fuentes Jr., M.D.    Preoperative Diagnosis: GERD    Postoperative Diagnosis: Small to moderate hiatal hernia    Procedure Performed: Transoral esophagogastroduodenoscopy with gastric antral biopsies    Indications: 42-year-old female who presents for preoperative valuation.  Patient does take Pepcid on a regular basis    Procedure:     The procedure, indications, preparation and potential complications were explained to the patient, who indicated understanding and signed the corresponding consent forms.  The patient was identified, taken to the endoscopy suite, and placed on the left side down decubitus position.  The patient underwent a MAC anesthesia and was appropriately monitored through the case by the anesthesia personnel with continuous pulse oximetry, blood pressure, and cardiac monitoring.  A bite block was placed.  After adequate IV sedation and using a transoral technique a lubed flexible endoscope was placed in the hypopharynx and advanced to the second portion of the duodenum without difficulty. The scope was then withdrawn back into the antrum of the stomach.  Cold forcep biopsies of the antrum were taken to rule out Helicobacter pylori.  The scope was retroflexed noting the body, fundus and cardia.  The scope was then withdrawn back into the esophagus after decompressing the stomach.  The Z line was noted and GE junction measured from the incisors.  The scope was then completely withdrawn.  The patient tolerated the procedure well and left the endoscopy suite in stable condition.  The findings are listed below.    Duodenum: Unremarkable  Antrum: Unremarkable  Body/Fundus: Unremarkable  Cardia: Small to moderate size defect  Esophagus: Z-line fairly regular    Recommendations:     Await biopsy results follow-up in the office as scheduled

## 2022-10-19 LAB — UREASE TISS QL: POSITIVE

## 2022-10-20 ENCOUNTER — TELEPHONE (OUTPATIENT)
Dept: BARIATRICS/WEIGHT MGMT | Facility: CLINIC | Age: 42
End: 2022-10-20

## 2022-10-20 DIAGNOSIS — B96.81 HELICOBACTER PYLORI GASTRITIS: Primary | ICD-10-CM

## 2022-10-20 DIAGNOSIS — K29.70 HELICOBACTER PYLORI GASTRITIS: Primary | ICD-10-CM

## 2022-10-20 NOTE — TELEPHONE ENCOUNTER
Inform about results EGD  ----- Message from Kevin Fuentes Jr., MD sent at 10/20/2022  6:18 AM EDT -----  Please call the patient regarding her abnormal result.

## 2022-10-21 RX ORDER — CLARITHROMYCIN 500 MG/1
500 TABLET, COATED ORAL 2 TIMES DAILY
Qty: 56 TABLET | Refills: 0 | Status: SHIPPED | OUTPATIENT
Start: 2022-10-21 | End: 2022-11-04

## 2022-10-21 RX ORDER — LANSOPRAZOLE 30 MG/1
30 CAPSULE, DELAYED RELEASE ORAL 2 TIMES DAILY
Qty: 28 CAPSULE | Refills: 0 | Status: SHIPPED | OUTPATIENT
Start: 2022-10-21 | End: 2022-11-04

## 2022-10-21 RX ORDER — AMOXICILLIN 500 MG/1
1000 CAPSULE ORAL 2 TIMES DAILY
Qty: 56 CAPSULE | Refills: 0 | Status: SHIPPED | OUTPATIENT
Start: 2022-10-21 | End: 2022-11-04

## 2022-12-01 ENCOUNTER — CONSULT (OUTPATIENT)
Dept: BARIATRICS/WEIGHT MGMT | Facility: CLINIC | Age: 42
End: 2022-12-01

## 2022-12-01 ENCOUNTER — PREP FOR SURGERY (OUTPATIENT)
Dept: OTHER | Facility: HOSPITAL | Age: 42
End: 2022-12-01

## 2022-12-01 VITALS
DIASTOLIC BLOOD PRESSURE: 97 MMHG | SYSTOLIC BLOOD PRESSURE: 154 MMHG | BODY MASS INDEX: 53.92 KG/M2 | HEIGHT: 62 IN | WEIGHT: 293 LBS | HEART RATE: 90 BPM | TEMPERATURE: 98 F

## 2022-12-01 DIAGNOSIS — E66.01 CLASS 3 SEVERE OBESITY DUE TO EXCESS CALORIES WITH SERIOUS COMORBIDITY AND BODY MASS INDEX (BMI) OF 60.0 TO 69.9 IN ADULT: Primary | ICD-10-CM

## 2022-12-01 DIAGNOSIS — K21.9 GASTROESOPHAGEAL REFLUX DISEASE, UNSPECIFIED WHETHER ESOPHAGITIS PRESENT: ICD-10-CM

## 2022-12-01 DIAGNOSIS — Z86.718 HISTORY OF DVT (DEEP VEIN THROMBOSIS): ICD-10-CM

## 2022-12-01 DIAGNOSIS — M25.50 MULTIPLE JOINT PAIN: ICD-10-CM

## 2022-12-01 DIAGNOSIS — I10 BENIGN ESSENTIAL HYPERTENSION: ICD-10-CM

## 2022-12-01 DIAGNOSIS — K44.9 HIATAL HERNIA: ICD-10-CM

## 2022-12-01 DIAGNOSIS — Z71.3 DIETARY COUNSELING: ICD-10-CM

## 2022-12-01 DIAGNOSIS — G47.33 OSA (OBSTRUCTIVE SLEEP APNEA): ICD-10-CM

## 2022-12-01 DIAGNOSIS — E78.5 BORDERLINE HYPERLIPIDEMIA: ICD-10-CM

## 2022-12-01 DIAGNOSIS — R60.0 LOCALIZED EDEMA: ICD-10-CM

## 2022-12-01 DIAGNOSIS — K76.0 NONALCOHOLIC FATTY LIVER DISEASE: ICD-10-CM

## 2022-12-01 PROBLEM — E66.813 CLASS 3 SEVERE OBESITY DUE TO EXCESS CALORIES WITH SERIOUS COMORBIDITY AND BODY MASS INDEX (BMI) OF 60.0 TO 69.9 IN ADULT: Status: ACTIVE | Noted: 2022-08-23

## 2022-12-01 PROBLEM — Z01.818 PRE-OP EVALUATION: Status: RESOLVED | Noted: 2022-08-23 | Resolved: 2022-12-01

## 2022-12-01 PROCEDURE — 99215 OFFICE O/P EST HI 40 MIN: CPT | Performed by: SURGERY

## 2022-12-01 RX ORDER — PROMETHAZINE HYDROCHLORIDE 25 MG/1
25 SUPPOSITORY RECTAL ONCE AS NEEDED
Status: CANCELLED | OUTPATIENT
Start: 2022-12-21

## 2022-12-01 RX ORDER — CEFAZOLIN SODIUM IN 0.9 % NACL 3 G/100 ML
3 INTRAVENOUS SOLUTION, PIGGYBACK (ML) INTRAVENOUS ONCE
Status: CANCELLED | OUTPATIENT
Start: 2022-12-21 | End: 2022-12-01

## 2022-12-01 RX ORDER — SODIUM CHLORIDE 9 MG/ML
40 INJECTION, SOLUTION INTRAVENOUS AS NEEDED
Status: CANCELLED | OUTPATIENT
Start: 2022-12-21

## 2022-12-01 RX ORDER — PANTOPRAZOLE SODIUM 40 MG/10ML
40 INJECTION, POWDER, LYOPHILIZED, FOR SOLUTION INTRAVENOUS ONCE
Status: CANCELLED | OUTPATIENT
Start: 2022-12-21 | End: 2022-12-01

## 2022-12-01 RX ORDER — PROMETHAZINE HYDROCHLORIDE 12.5 MG/1
12.5 TABLET ORAL ONCE AS NEEDED
Status: CANCELLED | OUTPATIENT
Start: 2022-12-21

## 2022-12-01 RX ORDER — SODIUM CHLORIDE, SODIUM LACTATE, POTASSIUM CHLORIDE, CALCIUM CHLORIDE 600; 310; 30; 20 MG/100ML; MG/100ML; MG/100ML; MG/100ML
100 INJECTION, SOLUTION INTRAVENOUS CONTINUOUS
Status: CANCELLED | OUTPATIENT
Start: 2022-12-21

## 2022-12-01 RX ORDER — ENOXAPARIN SODIUM 100 MG/ML
40 INJECTION SUBCUTANEOUS EVERY 12 HOURS SCHEDULED
Qty: 11.2 ML | Refills: 0 | Status: SHIPPED | OUTPATIENT
Start: 2022-12-01 | End: 2022-12-15

## 2022-12-01 RX ORDER — CHLORHEXIDINE GLUCONATE 0.12 MG/ML
15 RINSE ORAL SEE ADMIN INSTRUCTIONS
Status: CANCELLED | OUTPATIENT
Start: 2022-12-21

## 2022-12-01 RX ORDER — SODIUM CHLORIDE 0.9 % (FLUSH) 0.9 %
3-10 SYRINGE (ML) INJECTION AS NEEDED
Status: CANCELLED | OUTPATIENT
Start: 2022-12-21

## 2022-12-01 RX ORDER — SCOLOPAMINE TRANSDERMAL SYSTEM 1 MG/1
1 PATCH, EXTENDED RELEASE TRANSDERMAL CONTINUOUS
Status: CANCELLED | OUTPATIENT
Start: 2022-12-21 | End: 2022-12-24

## 2022-12-01 RX ORDER — GABAPENTIN 250 MG/5ML
300 SOLUTION ORAL ONCE
Status: CANCELLED | OUTPATIENT
Start: 2022-12-21 | End: 2022-12-01

## 2022-12-01 RX ORDER — METOCLOPRAMIDE HYDROCHLORIDE 5 MG/ML
10 INJECTION INTRAMUSCULAR; INTRAVENOUS ONCE
Status: CANCELLED | OUTPATIENT
Start: 2022-12-21 | End: 2022-12-01

## 2022-12-01 RX ORDER — SODIUM CHLORIDE 0.9 % (FLUSH) 0.9 %
3 SYRINGE (ML) INJECTION EVERY 12 HOURS SCHEDULED
Status: CANCELLED | OUTPATIENT
Start: 2022-12-01

## 2022-12-01 NOTE — H&P
Bariatric Consult:  Referred by Emmy Díaz MD    Yancy Hernandez is here today for consult on Nutrition Counseling (Class before surgery)      History of Present Illness:     Yancy Hernandez is a 42 y.o. female with morbid obesity with co-morbidities including sleep apnea, hypertension, dyslipidemia and depression who presents for surgical consultation for the above procedure. Yancy has completed the initial intake visit and has been examined by our nurse practitioner, dietician, psychologist and underwent the extensive educational teaching process under the guidance of our bariatric coordinator and myself. Yancy also has seen or if has not yet will see the educational video DEYSI on the surgical procedure if available. Yancy attended today more educational teaching from our bariatric coordinator and myself. Yancy has had an extensive medical workup including a visit with their primary care physician, EKG, chest radiograph, blood work, EGD or UGI and possibly further testing. These have been reviewed by me and discussed with the patient. Yancy is now ready to proceed with surgery. Yancy presently denies nausea, vomiting, fever, chills, chest pain, shortness of air, melena, hematochezia, hemetemesis, dysuria, frequency, hematuria.       Past Medical History:   Diagnosis Date   • Arthritis    • Carpal tunnel syndrome    • Depression    • Helicobacter pylori infection 11/27/2015   • Hyperlipidemia    • Hypertension    • Migraine    • Obesity    • Sleep apnea     cpap   • Urinary incontinence        Encounter Diagnoses   Name Primary?   • Class 3 severe obesity due to excess calories with serious comorbidity and body mass index (BMI) of 60.0 to 69.9 in adult (HCC) Yes   • HORTENCIA (obstructive sleep apnea)    • Multiple joint pain    • Hiatal hernia    • Gastroesophageal reflux disease, unspecified whether esophagitis present    • Nonalcoholic fatty liver disease    • Dietary counseling    •  Borderline hyperlipidemia    • Benign essential hypertension    • History of DVT (deep vein thrombosis)    • Localized edema        Past Surgical History:   Procedure Laterality Date   • BILATERAL BREAST REDUCTION     • DENTAL PROCEDURE     • DILATATION AND CURETTAGE     • ENDOMETRIAL ABLATION     • ENDOSCOPY N/A 10/18/2022    Procedure: ESOPHAGOGASTRODUODENOSCOPY WITH BIOPSY;  Surgeon: Kevin Fuentes Jr., MD;  Location: [facility] ENDOSCOPY;  Service: General;  Laterality: N/A;  PRE- GERD  POST- GASTRITIS   • LAPAROSCOPIC CHOLECYSTECTOMY     • TONSILLECTOMY     • TUBAL ABDOMINAL LIGATION         Patient Active Problem List   Diagnosis   • Vitamin D deficiency   • Nonalcoholic fatty liver disease   • Iron deficiency anemia   • Edema   • Benign essential hypertension   • Class 3 severe obesity due to excess calories with serious comorbidity and body mass index (BMI) of 60.0 to 69.9 in adult (HCC)   • Dietary counseling   • HORTENCIA (obstructive sleep apnea)   • History of DVT (deep vein thrombosis)   • GERD (gastroesophageal reflux disease)   • Multiple joint pain   • Borderline hyperlipidemia   • Hiatal hernia       Allergies   Allergen Reactions   • Hydrocodone-Acetaminophen Hallucinations   • Codeine Other (See Comments)     Cold sweats         Current Outpatient Medications:   •  benazepril-hydrochlorthiazide (LOTENSIN HCT) 20-12.5 MG per tablet, Take 2 tablets by mouth Daily., Disp: , Rfl:   •  Enoxaparin Sodium (LOVENOX) 40 MG/0.4ML solution prefilled syringe syringe, Inject 0.4 mL under the skin into the appropriate area as directed Every 12 (Twelve) Hours for 14 days. Start after surgery unless instructed otherwise, Disp: 11.2 mL, Rfl: 0  •  Famotidine (PEPCID PO), Take  by mouth As Needed., Disp: , Rfl:   •  folic acid-vit B6-vit B12 (FOLBEE) 2.5-25-1 MG tablet tablet, Take 1 tablet by mouth Daily., Disp: 40 tablet, Rfl: 0  •  furosemide (LASIX) 40 MG tablet, Take 1 tablet by mouth As Needed., Disp: , Rfl:   •   potassium chloride 10 MEQ CR tablet, Take 1 tablet by mouth As Needed. With lasix, Disp: , Rfl:     Social History     Socioeconomic History   • Marital status:    • Number of children: 8   Tobacco Use   • Smoking status: Never   • Smokeless tobacco: Never   Vaping Use   • Vaping Use: Never used   Substance and Sexual Activity   • Alcohol use: Not Currently     Comment: rare   • Drug use: Never   • Sexual activity: Defer       Family History   Problem Relation Age of Onset   • Obesity Mother    • Hypertension Mother    • Hypertension Father    • Sleep apnea Father    • Sleep apnea Sister    • Hypertension Sister        Review of Systems:  Review of Systems   Constitutional: Positive for fatigue.   Musculoskeletal: Positive for arthralgias and back pain.   All other systems reviewed and are negative.        Physical Exam:    Vital Signs:  Weight: (!) 161 kg (355 lb)   Body mass index is 64.91 kg/m².  Temp: 98 °F (36.7 °C)   Heart Rate: 90   BP: 154/97       Physical Exam  Vitals reviewed.   HENT:      Head: Normocephalic and atraumatic.      Mouth/Throat:      Mouth: Mucous membranes are moist.      Pharynx: Oropharynx is clear.   Eyes:      General: No scleral icterus.     Extraocular Movements: Extraocular movements intact.      Conjunctiva/sclera: Conjunctivae normal.      Pupils: Pupils are equal, round, and reactive to light.   Neck:      Thyroid: No thyromegaly.   Cardiovascular:      Rate and Rhythm: Normal rate.   Pulmonary:      Effort: Pulmonary effort is normal. No respiratory distress.      Breath sounds: Normal breath sounds. No stridor. No wheezing or rhonchi.   Abdominal:      General: Bowel sounds are normal.      Palpations: Abdomen is soft.      Tenderness: There is no abdominal tenderness. There is no right CVA tenderness, left CVA tenderness, guarding or rebound.      Hernia: No hernia is present.   Musculoskeletal:         General: Normal range of motion.      Cervical back: Normal range  of motion and neck supple.      Right lower leg: Edema present.      Left lower leg: Edema present.   Lymphadenopathy:      Cervical: No cervical adenopathy.   Skin:     General: Skin is warm and dry.      Findings: No erythema.   Neurological:      Mental Status: She is alert and oriented to person, place, and time.   Psychiatric:         Mood and Affect: Mood normal.         Behavior: Behavior normal.         Thought Content: Thought content normal.         Judgment: Judgment normal.           Assessment:    Yancy Hernandez is a 42 y.o. year old female with medically complicated severe obesity with a BMI of Body mass index is 64.91 kg/m². and multiple co-morbidities listed in the encounter diagnosis.    I think she is an appropriate candidate for this surgery, and is ready to proceed.      Plan/Discussion/Summary:  Small to moderate size hiatal hernia per me.  Patient does take Pepcid.  H. pylori positive and has been treated.    The patient has returned to the office for a surgical consultation and has requested to proceed with gastric sleeve.  I have had the opportunity to obtain a history, examine the patient and review the patient's chart. The procedure could be done laparoscopically and or robotically.    The patient understands that surgery is a tool and that weight loss is not guaranteed but only seen in the context of appropriate use, regular follow up, exercise and making appropriate food choices.     I personally discussed the potential complications of the laparoscopic gastric sleeve with this patient.  The patient is well aware of potential complications of the surgery that include but not limited to bleeding, infections, deep vein thrombosis, pulmonary embolism, pulmonary complications such as pneumonia, cardiac event, hernias, small bowel obstruction, damage to the spleen or other organs, bowel injury, disfiguring scars, failure to lose weight, need for additional surgery, conversion to an open  procedure and death.  The patient is also aware of complications which apply in particular to the gastric sleeve and can include but not limited to the leakage of gastric contents at the staple line, the development of an intra-abdominal abscess, gastroesophageal reflux disease, Rashid's esophagus, ulcers, vitamin/mineral deficiencies, strictures, and the possibility of converting this procedure to a Ronnie-en-Y gastric bypass. The patient also understands the possibility of requiring an acid reducer medication for the rest of their life.    The risks, benefits, potential complications and alternative therapies were discussed at great length as outlined in our extensive consent forms, online consent and educational teaching processes.    The patient has confirmed the participation in the programs extensive educational activities.    All questions and concerns were answered to patient's satisfaction.  The patient now wishes to proceed with surgery.     The patient has agreed to a postoperative course of anitcoagulant therapy.      I instructed patient that the surgery could be laparoscopically and/or robotically.    I instructed patient to start on a H2 blocker or proton pump inhibitor if not already on one of these medications.    I explained in detail the procedures that are in the consent.  All of these procedures have a chance to convert to open if any technical challenges or complications do occur.  Bariatric surgery is not cosmetic surgery but rather a tool to help a patient make a life-long commitment lifestyle change including diet, exercise, behavior changes, and taking supplemental vitamins and minerals.    Problems after surgery may require more operations to correct them.    The risks, benefits, alternatives, and potential complications of all of the procedures were explained in detail including, but not limited to death, anesthesia and medication adverse effect, deep venous thrombosis, pulmonary embolism,  trocar site/incisional hernia, wound infection, abdominal infection, bleeding, failure to lose weight, gain weight, a change in body image, metabolic complications with vitamin deficiences and anemia.    Weight loss expectations were discussed with the patient in detail. The weight loss operations most commonly performed are the sleeve gastrectomy and the Ronnie-en-Y gastric bypass. These operations result in weight losses up to approximately 25-35% of initial body weight 12 to 24 months after surgery with the gastric bypass usually the higher percent of weight loss but depends on patient using the tool.    For the gastric bypass and loop duodenal switch (ANDI-S) the risks include but not limited to the following early complications:  Anastomotic leak/peritonitis, Ronnie/Alimentary/biliopancreatic limb obstruction, severe & minor wound infection/seroma, and nausea/vomiting.  Late complications can include but are not limited to malnutrition, vitamin deficiencies, frequent loose stools,  stomal stenosis, marginal ulcer, bowel obstruction, intussusception, internal, and incisional hernia.    Regarding the gastric sleeve, there is higher risk of dysphagia and reflux leading to possible Rashid's esophagus compared to a gastric bypass, as well as risk of internal visceral/organ injury, splenectomy, bleeding, infection, leak (which could require further intervention possible conversion to Ronnie-en-Y gastric bypass), stenosis and possibility of regaining weight.    Yancy was counseled regarding diagnostic results, instructions for management, risk factor reductions, prognosis, patient and family education, impressions, risks and benefits of treatment options and importance of compliance with treatment. Total time of the encounter was over 45 minutes counseling the patient regarding the procedure as above and reviewing as well as ordering labs, medications and the procedure.  The chart was also reviewed prior to seeing the  patient reviewing previous testing, studies and labs.    Yancy understands the surgical procedures and the different surgical options that are available.  She understands the lifestyle changes that are required after surgery and has agreed to follow the guidelines outlined in the weight management program.  She also expressed understanding of the risks involved and had all of female questions answered and desires to proceed.      Kevin Fuentes MD  12/1/2022

## 2022-12-01 NOTE — H&P (VIEW-ONLY)
Bariatric Consult:  Referred by Emmy Díaz MD    Yancy Hernandez is here today for consult on Nutrition Counseling (Class before surgery)      History of Present Illness:     Yancy Hernandez is a 42 y.o. female with morbid obesity with co-morbidities including sleep apnea, hypertension, dyslipidemia and depression who presents for surgical consultation for the above procedure. Yancy has completed the initial intake visit and has been examined by our nurse practitioner, dietician, psychologist and underwent the extensive educational teaching process under the guidance of our bariatric coordinator and myself. Yancy also has seen or if has not yet will see the educational video DEYSI on the surgical procedure if available. Yancy attended today more educational teaching from our bariatric coordinator and myself. Yancy has had an extensive medical workup including a visit with their primary care physician, EKG, chest radiograph, blood work, EGD or UGI and possibly further testing. These have been reviewed by me and discussed with the patient. Yancy is now ready to proceed with surgery. Yancy presently denies nausea, vomiting, fever, chills, chest pain, shortness of air, melena, hematochezia, hemetemesis, dysuria, frequency, hematuria.       Past Medical History:   Diagnosis Date   • Arthritis    • Carpal tunnel syndrome    • Depression    • Helicobacter pylori infection 11/27/2015   • Hyperlipidemia    • Hypertension    • Migraine    • Obesity    • Sleep apnea     cpap   • Urinary incontinence        Encounter Diagnoses   Name Primary?   • Class 3 severe obesity due to excess calories with serious comorbidity and body mass index (BMI) of 60.0 to 69.9 in adult (HCC) Yes   • HORTENCIA (obstructive sleep apnea)    • Multiple joint pain    • Hiatal hernia    • Gastroesophageal reflux disease, unspecified whether esophagitis present    • Nonalcoholic fatty liver disease    • Dietary counseling    •  Borderline hyperlipidemia    • Benign essential hypertension    • History of DVT (deep vein thrombosis)    • Localized edema        Past Surgical History:   Procedure Laterality Date   • BILATERAL BREAST REDUCTION     • DENTAL PROCEDURE     • DILATATION AND CURETTAGE     • ENDOMETRIAL ABLATION     • ENDOSCOPY N/A 10/18/2022    Procedure: ESOPHAGOGASTRODUODENOSCOPY WITH BIOPSY;  Surgeon: Kevin Fuentes Jr., MD;  Location: [facility] ENDOSCOPY;  Service: General;  Laterality: N/A;  PRE- GERD  POST- GASTRITIS   • LAPAROSCOPIC CHOLECYSTECTOMY     • TONSILLECTOMY     • TUBAL ABDOMINAL LIGATION         Patient Active Problem List   Diagnosis   • Vitamin D deficiency   • Nonalcoholic fatty liver disease   • Iron deficiency anemia   • Edema   • Benign essential hypertension   • Class 3 severe obesity due to excess calories with serious comorbidity and body mass index (BMI) of 60.0 to 69.9 in adult (HCC)   • Dietary counseling   • HORTENCIA (obstructive sleep apnea)   • History of DVT (deep vein thrombosis)   • GERD (gastroesophageal reflux disease)   • Multiple joint pain   • Borderline hyperlipidemia   • Hiatal hernia       Allergies   Allergen Reactions   • Hydrocodone-Acetaminophen Hallucinations   • Codeine Other (See Comments)     Cold sweats         Current Outpatient Medications:   •  benazepril-hydrochlorthiazide (LOTENSIN HCT) 20-12.5 MG per tablet, Take 2 tablets by mouth Daily., Disp: , Rfl:   •  Enoxaparin Sodium (LOVENOX) 40 MG/0.4ML solution prefilled syringe syringe, Inject 0.4 mL under the skin into the appropriate area as directed Every 12 (Twelve) Hours for 14 days. Start after surgery unless instructed otherwise, Disp: 11.2 mL, Rfl: 0  •  Famotidine (PEPCID PO), Take  by mouth As Needed., Disp: , Rfl:   •  folic acid-vit B6-vit B12 (FOLBEE) 2.5-25-1 MG tablet tablet, Take 1 tablet by mouth Daily., Disp: 40 tablet, Rfl: 0  •  furosemide (LASIX) 40 MG tablet, Take 1 tablet by mouth As Needed., Disp: , Rfl:   •   potassium chloride 10 MEQ CR tablet, Take 1 tablet by mouth As Needed. With lasix, Disp: , Rfl:     Social History     Socioeconomic History   • Marital status:    • Number of children: 8   Tobacco Use   • Smoking status: Never   • Smokeless tobacco: Never   Vaping Use   • Vaping Use: Never used   Substance and Sexual Activity   • Alcohol use: Not Currently     Comment: rare   • Drug use: Never   • Sexual activity: Defer       Family History   Problem Relation Age of Onset   • Obesity Mother    • Hypertension Mother    • Hypertension Father    • Sleep apnea Father    • Sleep apnea Sister    • Hypertension Sister        Review of Systems:  Review of Systems   Constitutional: Positive for fatigue.   Musculoskeletal: Positive for arthralgias and back pain.   All other systems reviewed and are negative.        Physical Exam:    Vital Signs:  Weight: (!) 161 kg (355 lb)   Body mass index is 64.91 kg/m².  Temp: 98 °F (36.7 °C)   Heart Rate: 90   BP: 154/97       Physical Exam  Vitals reviewed.   HENT:      Head: Normocephalic and atraumatic.      Mouth/Throat:      Mouth: Mucous membranes are moist.      Pharynx: Oropharynx is clear.   Eyes:      General: No scleral icterus.     Extraocular Movements: Extraocular movements intact.      Conjunctiva/sclera: Conjunctivae normal.      Pupils: Pupils are equal, round, and reactive to light.   Neck:      Thyroid: No thyromegaly.   Cardiovascular:      Rate and Rhythm: Normal rate.   Pulmonary:      Effort: Pulmonary effort is normal. No respiratory distress.      Breath sounds: Normal breath sounds. No stridor. No wheezing or rhonchi.   Abdominal:      General: Bowel sounds are normal.      Palpations: Abdomen is soft.      Tenderness: There is no abdominal tenderness. There is no right CVA tenderness, left CVA tenderness, guarding or rebound.      Hernia: No hernia is present.   Musculoskeletal:         General: Normal range of motion.      Cervical back: Normal range  of motion and neck supple.      Right lower leg: Edema present.      Left lower leg: Edema present.   Lymphadenopathy:      Cervical: No cervical adenopathy.   Skin:     General: Skin is warm and dry.      Findings: No erythema.   Neurological:      Mental Status: She is alert and oriented to person, place, and time.   Psychiatric:         Mood and Affect: Mood normal.         Behavior: Behavior normal.         Thought Content: Thought content normal.         Judgment: Judgment normal.           Assessment:    Yancy Hernandez is a 42 y.o. year old female with medically complicated severe obesity with a BMI of Body mass index is 64.91 kg/m². and multiple co-morbidities listed in the encounter diagnosis.    I think she is an appropriate candidate for this surgery, and is ready to proceed.      Plan/Discussion/Summary:  Small to moderate size hiatal hernia per me.  Patient does take Pepcid.  H. pylori positive and has been treated.    The patient has returned to the office for a surgical consultation and has requested to proceed with gastric sleeve.  I have had the opportunity to obtain a history, examine the patient and review the patient's chart. The procedure could be done laparoscopically and or robotically.    The patient understands that surgery is a tool and that weight loss is not guaranteed but only seen in the context of appropriate use, regular follow up, exercise and making appropriate food choices.     I personally discussed the potential complications of the laparoscopic gastric sleeve with this patient.  The patient is well aware of potential complications of the surgery that include but not limited to bleeding, infections, deep vein thrombosis, pulmonary embolism, pulmonary complications such as pneumonia, cardiac event, hernias, small bowel obstruction, damage to the spleen or other organs, bowel injury, disfiguring scars, failure to lose weight, need for additional surgery, conversion to an open  procedure and death.  The patient is also aware of complications which apply in particular to the gastric sleeve and can include but not limited to the leakage of gastric contents at the staple line, the development of an intra-abdominal abscess, gastroesophageal reflux disease, Rashid's esophagus, ulcers, vitamin/mineral deficiencies, strictures, and the possibility of converting this procedure to a Ronnie-en-Y gastric bypass. The patient also understands the possibility of requiring an acid reducer medication for the rest of their life.    The risks, benefits, potential complications and alternative therapies were discussed at great length as outlined in our extensive consent forms, online consent and educational teaching processes.    The patient has confirmed the participation in the programs extensive educational activities.    All questions and concerns were answered to patient's satisfaction.  The patient now wishes to proceed with surgery.     The patient has agreed to a postoperative course of anitcoagulant therapy.      I instructed patient that the surgery could be laparoscopically and/or robotically.    I instructed patient to start on a H2 blocker or proton pump inhibitor if not already on one of these medications.    I explained in detail the procedures that are in the consent.  All of these procedures have a chance to convert to open if any technical challenges or complications do occur.  Bariatric surgery is not cosmetic surgery but rather a tool to help a patient make a life-long commitment lifestyle change including diet, exercise, behavior changes, and taking supplemental vitamins and minerals.    Problems after surgery may require more operations to correct them.    The risks, benefits, alternatives, and potential complications of all of the procedures were explained in detail including, but not limited to death, anesthesia and medication adverse effect, deep venous thrombosis, pulmonary embolism,  trocar site/incisional hernia, wound infection, abdominal infection, bleeding, failure to lose weight, gain weight, a change in body image, metabolic complications with vitamin deficiences and anemia.    Weight loss expectations were discussed with the patient in detail. The weight loss operations most commonly performed are the sleeve gastrectomy and the Ronnie-en-Y gastric bypass. These operations result in weight losses up to approximately 25-35% of initial body weight 12 to 24 months after surgery with the gastric bypass usually the higher percent of weight loss but depends on patient using the tool.    For the gastric bypass and loop duodenal switch (ANDI-S) the risks include but not limited to the following early complications:  Anastomotic leak/peritonitis, Ronnie/Alimentary/biliopancreatic limb obstruction, severe & minor wound infection/seroma, and nausea/vomiting.  Late complications can include but are not limited to malnutrition, vitamin deficiencies, frequent loose stools,  stomal stenosis, marginal ulcer, bowel obstruction, intussusception, internal, and incisional hernia.    Regarding the gastric sleeve, there is higher risk of dysphagia and reflux leading to possible Rashid's esophagus compared to a gastric bypass, as well as risk of internal visceral/organ injury, splenectomy, bleeding, infection, leak (which could require further intervention possible conversion to Ronnie-en-Y gastric bypass), stenosis and possibility of regaining weight.    Yancy was counseled regarding diagnostic results, instructions for management, risk factor reductions, prognosis, patient and family education, impressions, risks and benefits of treatment options and importance of compliance with treatment. Total time of the encounter was over 45 minutes counseling the patient regarding the procedure as above and reviewing as well as ordering labs, medications and the procedure.  The chart was also reviewed prior to seeing the  patient reviewing previous testing, studies and labs.    Yancy understands the surgical procedures and the different surgical options that are available.  She understands the lifestyle changes that are required after surgery and has agreed to follow the guidelines outlined in the weight management program.  She also expressed understanding of the risks involved and had all of female questions answered and desires to proceed.      Kevin Fuentes MD  12/1/2022

## 2022-12-01 NOTE — PATIENT INSTRUCTIONS
Bariatric Manual    You were provided a manual specific to the procedure that you have chosen.  Please refer to that with any questions or call the office at [phone]

## 2022-12-15 ENCOUNTER — PRE-ADMISSION TESTING (OUTPATIENT)
Dept: PREADMISSION TESTING | Facility: HOSPITAL | Age: 42
End: 2022-12-15

## 2022-12-15 VITALS
OXYGEN SATURATION: 97 % | HEART RATE: 94 BPM | BODY MASS INDEX: 64.93 KG/M2 | DIASTOLIC BLOOD PRESSURE: 79 MMHG | HEIGHT: 62 IN | RESPIRATION RATE: 18 BRPM | TEMPERATURE: 98.4 F | SYSTOLIC BLOOD PRESSURE: 142 MMHG

## 2022-12-15 DIAGNOSIS — E66.01 CLASS 3 SEVERE OBESITY DUE TO EXCESS CALORIES WITH SERIOUS COMORBIDITY AND BODY MASS INDEX (BMI) OF 60.0 TO 69.9 IN ADULT: ICD-10-CM

## 2022-12-15 LAB
ALBUMIN SERPL-MCNC: 4.1 G/DL (ref 3.5–5.2)
ALBUMIN/GLOB SERPL: 1.2 G/DL
ALP SERPL-CCNC: 59 U/L (ref 39–117)
ALT SERPL W P-5'-P-CCNC: 37 U/L (ref 1–33)
ANION GAP SERPL CALCULATED.3IONS-SCNC: 8 MMOL/L (ref 5–15)
AST SERPL-CCNC: 19 U/L (ref 1–32)
BILIRUB SERPL-MCNC: 0.4 MG/DL (ref 0–1.2)
BUN SERPL-MCNC: 21 MG/DL (ref 6–20)
BUN/CREAT SERPL: 24.7 (ref 7–25)
CALCIUM SPEC-SCNC: 9.7 MG/DL (ref 8.6–10.5)
CHLORIDE SERPL-SCNC: 100 MMOL/L (ref 98–107)
CO2 SERPL-SCNC: 28 MMOL/L (ref 22–29)
CREAT SERPL-MCNC: 0.85 MG/DL (ref 0.57–1)
DEPRECATED RDW RBC AUTO: 38.4 FL (ref 37–54)
EGFRCR SERPLBLD CKD-EPI 2021: 87.8 ML/MIN/1.73
ERYTHROCYTE [DISTWIDTH] IN BLOOD BY AUTOMATED COUNT: 12 % (ref 12.3–15.4)
GLOBULIN UR ELPH-MCNC: 3.4 GM/DL
GLUCOSE SERPL-MCNC: 131 MG/DL (ref 65–99)
HCG SERPL QL: NEGATIVE
HCT VFR BLD AUTO: 35.8 % (ref 34–46.6)
HGB BLD-MCNC: 11.4 G/DL (ref 12–15.9)
MCH RBC QN AUTO: 27.7 PG (ref 26.6–33)
MCHC RBC AUTO-ENTMCNC: 31.8 G/DL (ref 31.5–35.7)
MCV RBC AUTO: 87.1 FL (ref 79–97)
PLATELET # BLD AUTO: 333 10*3/MM3 (ref 140–450)
PMV BLD AUTO: 11 FL (ref 6–12)
POTASSIUM SERPL-SCNC: 3.4 MMOL/L (ref 3.5–5.2)
PROT SERPL-MCNC: 7.5 G/DL (ref 6–8.5)
RBC # BLD AUTO: 4.11 10*6/MM3 (ref 3.77–5.28)
SODIUM SERPL-SCNC: 136 MMOL/L (ref 136–145)
WBC NRBC COR # BLD: 6.95 10*3/MM3 (ref 3.4–10.8)

## 2022-12-15 PROCEDURE — 85027 COMPLETE CBC AUTOMATED: CPT

## 2022-12-15 PROCEDURE — 36415 COLL VENOUS BLD VENIPUNCTURE: CPT

## 2022-12-15 PROCEDURE — 80053 COMPREHEN METABOLIC PANEL: CPT

## 2022-12-15 PROCEDURE — 84703 CHORIONIC GONADOTROPIN ASSAY: CPT

## 2022-12-15 RX ORDER — CHLORHEXIDINE GLUCONATE 500 MG/1
1 CLOTH TOPICAL TAKE AS DIRECTED
COMMUNITY
End: 2022-12-22 | Stop reason: HOSPADM

## 2022-12-15 RX ORDER — MULTIPLE VITAMINS W/ MINERALS TAB 9MG-400MCG
1 TAB ORAL DAILY
COMMUNITY

## 2022-12-15 NOTE — DISCHARGE INSTRUCTIONS
Take only the following medications the morning of surgery:   NONE    Arrive to hospital on your day of surgery at  9:00 AM PER MD OFFICE.       Do not take Bariatric Vitamins, Folic Acid, Actigall (if applicable) or Lovenox Injections (if applicable) the morning of surgery.  If you have a history of blood clots or have a BMI greater than 50, Dr. Fuentes may order Lovenox for after surgery. Do not take Lovenox blood thinner before surgery.      General Instructions:    Liquids only the day before surgery.  Drink one 20 ounce Gatorade G2 the evening before surgery.  Nothing red in color.   The morning of surgery have another 20 ounce Gatorade G2.  Again, nothing red in color.  Your drink must be completed 2 hours before your arrival time.   Patients who avoid smoking, chewing tobacco and alcohol for 4 weeks prior to surgery have a reduced risk of post-operative complications.  Quit smoking as many days before surgery as you can.  Do not smoke, use chewing tobacco or drink alcohol the day of surgery.   Bring any papers given to you in the doctor's office.  Wear clean comfortable clothes.  Do not wear contact lenses, false eyelashes or make-up.  Bring a case for your glasses.   Bring crutches or walker if applicable.  Remove all piercings.  Leave jewelry and any other valuables at home.  Remove fingernail polish, gel overlays or any artificial nails.  Hair extensions with metal clips must be removed prior to surgery.  The Pre-Admission Testing nurse will instruct you to bring medications if unable to obtain an accurate list in Pre-Admission Testing.    If you were given a blood bank ID arm band remember to bring it with you the day of surgery.    Preventing a Surgical Site Infection:  For 2 to 3 days before surgery, avoid shaving with a razor because the razor can irritate skin and make it easier to develop an infection.    Any areas of open skin can increase the risk of a post-operative wound infection by allowing  bacteria to enter and travel throughout the body.  Notify your surgeon if you have any skin wounds / rashes even if it is not near the expected surgical site.  The area will need assessed to determine if surgery should be delayed until it is healed.  2 days prior to surgery, take a shower using a fresh bar of anti-bacterial soap (such as Dial).  Use a clean washcloth and dry with a clean towel.    The day prior to surgery, take a shower using a fresh bar of anti-bacterial soap (such as Dial).  Use a clean washcloth and dry with a clean towel.  Sleep in a clean bed with clean clothing.  Do not allow pets to sleep with you.  The morning of surgery shower using a fresh bar of anti-bacterial soap (such as Dial).  Use a clean washcloth and dry with a clean towel.  Follow the Chlorhexidine instructions below.    CHLORHEXIDINE CLOTH INSTRUCTIONS  The morning of surgery follow these instructions using the Chlorhexidine cloths you've been given.  These steps reduce bacteria on the body.  Do not use the cloths near your eyes, ears mouth, genitalia or on open wounds.  Throw the cloths away after use but do not try to flush them down a toilet.    Open and remove one cloth at a time from the package.    Leave the cloth unfolded and begin the bathing.  Massage the skin with the cloths using gentle pressure to remove bacteria.  Do not scrub harshly.   Follow the steps below with one 2% CHG cloth per area (6 total cloths).  One cloth for neck, shoulders and chest.  One cloth for both arms, hands, fingers and underarms (do underarms last).  One cloth for the abdomen followed by groin.  One cloth for right leg and foot including between the toes.  One cloth for left leg and foot including between the toes.  The last cloth is to be used for the back of the neck, back and buttocks.    Allow the CHG to air dry 3 minutes on the skin which will give it time to work and decrease the chance of irritation.  The skin may feel sticky until it  is dry.  Do not rinse with water or any other liquid or you will lose the beneficial effects of the CHG.  If mild skin irritation occurs, do rinse the skin to remove the CHG.  Report this to the nurse at time of admission.  Do not apply lotions, creams, ointments, deodorants or perfumes after using the clothes. Dress in clean clothes before coming to the hospital.    Ask your surgeon if you will be receiving antibiotics prior to surgery.  Make sure you, your family, and all healthcare providers clean their hands with soap and water or an alcohol based hand  before caring for you or your wound.      Day of surgery:  Your arrival time is approximately two hours before your scheduled surgery time.  Upon arrival, a Pre-op nurse and Anesthesiologist will review your health history, obtain vital signs, and answer questions you may have.  A Pre-op nurse will start an IV and you may receive medication in preparation for surgery, including something to help you relax.  If applicable, we do ask that you have your C-PAP/BI-PAP machine available. It can be utilized the night of surgery.     Please be aware that surgery does come with discomfort.  We want to make every effort to control your discomfort so please discuss any uncontrolled symptoms with your nurse.   Your doctor will most likely have prescribed pain medications.      If you are going home after surgery you will receive individualized written care instructions before being discharged.  A responsible adult must drive you to and from the hospital on the day of your surgery and stay with you for 24 hours.  Discharge prescriptions can be filled by the hospital pharmacy during regular pharmacy hours.  If you are having surgery late in the day/evening your prescription may be e-prescribed to your pharmacy.  Please verify your pharmacy hours or chose a 24 hour pharmacy to avoid not having access to your prescription because your pharmacy has closed for the  day.    If you are staying overnight following surgery, you will be transported to your hospital room following the recovery period.  [facility] has all private rooms.    If you have any questions please call Pre-Admission Testing at [phone].  Deductibles and co-payments are collected on the day of service. Please be prepared to pay the required co-pay, deductible or deposit on the day of service as defined by your plan.    Call your surgeon immediately if you experience any of the following symptoms:  Sore Throat  Shortness of Breath or difficulty breathing  Cough  Chills  Body soreness or muscle pain  Headache  Fever  New loss of taste or smell  Do not arrive for your surgery ill.  Your procedure will need to be rescheduled to another time.  You will need to call your physician before the day of surgery to avoid any unnecessary exposure to hospital staff as well as other patients.

## 2022-12-21 ENCOUNTER — HOSPITAL ENCOUNTER (INPATIENT)
Facility: HOSPITAL | Age: 42
LOS: 1 days | Discharge: HOME | DRG: 621 | End: 2022-12-22
Attending: SURGERY | Admitting: SURGERY
Payer: COMMERCIAL

## 2022-12-21 ENCOUNTER — ANESTHESIA EVENT (OUTPATIENT)
Dept: PERIOP | Facility: HOSPITAL | Age: 42
DRG: 621 | End: 2022-12-21
Payer: COMMERCIAL

## 2022-12-21 ENCOUNTER — ANESTHESIA (OUTPATIENT)
Dept: PERIOP | Facility: HOSPITAL | Age: 42
DRG: 621 | End: 2022-12-21
Payer: COMMERCIAL

## 2022-12-21 DIAGNOSIS — Z98.84 S/P LAPAROSCOPIC SLEEVE GASTRECTOMY: Primary | ICD-10-CM

## 2022-12-21 DIAGNOSIS — E66.01 CLASS 3 SEVERE OBESITY DUE TO EXCESS CALORIES WITH SERIOUS COMORBIDITY AND BODY MASS INDEX (BMI) OF 60.0 TO 69.9 IN ADULT: ICD-10-CM

## 2022-12-21 PROCEDURE — 25010000002 FENTANYL CITRATE (PF) 50 MCG/ML SOLUTION: Performed by: ANESTHESIOLOGY

## 2022-12-21 PROCEDURE — 43281 LAP PARAESOPHAG HERN REPAIR: CPT | Performed by: NURSE PRACTITIONER

## 2022-12-21 PROCEDURE — 25010000002 EPINEPHRINE 1 MG/ML SOLUTION 30 ML VIAL: Performed by: SURGERY

## 2022-12-21 PROCEDURE — 25010000002 ENOXAPARIN PER 10 MG: Performed by: SURGERY

## 2022-12-21 PROCEDURE — S2900 ROBOTIC SURGICAL SYSTEM: HCPCS | Performed by: SURGERY

## 2022-12-21 PROCEDURE — 25010000002 DEXAMETHASONE SODIUM PHOSPHATE 20 MG/5ML SOLUTION: Performed by: NURSE ANESTHETIST, CERTIFIED REGISTERED

## 2022-12-21 PROCEDURE — 25010000002 METOCLOPRAMIDE PER 10 MG: Performed by: SURGERY

## 2022-12-21 PROCEDURE — 25010000002 KETOROLAC TROMETHAMINE PER 15 MG: Performed by: SURGERY

## 2022-12-21 PROCEDURE — 43775 LAP SLEEVE GASTRECTOMY: CPT | Performed by: NURSE PRACTITIONER

## 2022-12-21 PROCEDURE — 25010000002 MIDAZOLAM PER 1 MG: Performed by: ANESTHESIOLOGY

## 2022-12-21 PROCEDURE — 43775 LAP SLEEVE GASTRECTOMY: CPT | Performed by: SURGERY

## 2022-12-21 PROCEDURE — 25010000002 FENTANYL CITRATE (PF) 100 MCG/2ML SOLUTION: Performed by: NURSE ANESTHETIST, CERTIFIED REGISTERED

## 2022-12-21 PROCEDURE — 25010000002 CLONIDINE PER 1 MG: Performed by: SURGERY

## 2022-12-21 PROCEDURE — 25010000002 CEFAZOLIN PER 500 MG: Performed by: SURGERY

## 2022-12-21 PROCEDURE — 0BQT4ZZ REPAIR DIAPHRAGM, PERCUTANEOUS ENDOSCOPIC APPROACH: ICD-10-PCS | Performed by: SURGERY

## 2022-12-21 PROCEDURE — 8E0W4CZ ROBOTIC ASSISTED PROCEDURE OF TRUNK REGION, PERCUTANEOUS ENDOSCOPIC APPROACH: ICD-10-PCS | Performed by: SURGERY

## 2022-12-21 PROCEDURE — 43281 LAP PARAESOPHAG HERN REPAIR: CPT | Performed by: SURGERY

## 2022-12-21 PROCEDURE — 25010000002 MAGNESIUM SULFATE PER 500 MG OF MAGNESIUM: Performed by: NURSE ANESTHETIST, CERTIFIED REGISTERED

## 2022-12-21 PROCEDURE — 88307 TISSUE EXAM BY PATHOLOGIST: CPT | Performed by: SURGERY

## 2022-12-21 PROCEDURE — 25010000002 PROPOFOL 10 MG/ML EMULSION: Performed by: NURSE ANESTHETIST, CERTIFIED REGISTERED

## 2022-12-21 PROCEDURE — 25010000002 HYDROMORPHONE PER 4 MG: Performed by: ANESTHESIOLOGY

## 2022-12-21 PROCEDURE — 25010000002 ROPIVACAINE PER 1 MG: Performed by: SURGERY

## 2022-12-21 PROCEDURE — 0DB64Z3 EXCISION OF STOMACH, PERCUTANEOUS ENDOSCOPIC APPROACH, VERTICAL: ICD-10-PCS | Performed by: SURGERY

## 2022-12-21 PROCEDURE — 25010000002 ONDANSETRON PER 1 MG: Performed by: NURSE ANESTHETIST, CERTIFIED REGISTERED

## 2022-12-21 DEVICE — IMPLANTABLE DEVICE
Type: IMPLANTABLE DEVICE | Site: ABDOMEN | Status: FUNCTIONAL
Brand: TITAN SGS STANDARD GASTRIC STAPLER

## 2022-12-21 RX ORDER — CYANOCOBALAMIN 1000 UG/ML
1000 INJECTION, SOLUTION INTRAMUSCULAR; SUBCUTANEOUS ONCE
Status: COMPLETED | OUTPATIENT
Start: 2022-12-22 | End: 2022-12-22

## 2022-12-21 RX ORDER — LISINOPRIL 20 MG/1
20 TABLET ORAL
Status: DISCONTINUED | OUTPATIENT
Start: 2022-12-21 | End: 2022-12-22 | Stop reason: HOSPADM

## 2022-12-21 RX ORDER — HYDRALAZINE HYDROCHLORIDE 20 MG/ML
10 INJECTION INTRAMUSCULAR; INTRAVENOUS
Status: DISCONTINUED | OUTPATIENT
Start: 2022-12-21 | End: 2022-12-22 | Stop reason: HOSPADM

## 2022-12-21 RX ORDER — FAMOTIDINE 10 MG/ML
20 INJECTION, SOLUTION INTRAVENOUS EVERY 12 HOURS SCHEDULED
Status: DISCONTINUED | OUTPATIENT
Start: 2022-12-21 | End: 2022-12-22 | Stop reason: HOSPADM

## 2022-12-21 RX ORDER — LIDOCAINE HYDROCHLORIDE 10 MG/ML
0.5 INJECTION, SOLUTION EPIDURAL; INFILTRATION; INTRACAUDAL; PERINEURAL ONCE AS NEEDED
Status: DISCONTINUED | OUTPATIENT
Start: 2022-12-21 | End: 2022-12-21 | Stop reason: HOSPADM

## 2022-12-21 RX ORDER — PROMETHAZINE HYDROCHLORIDE 12.5 MG/1
12.5 SUPPOSITORY RECTAL EVERY 4 HOURS PRN
Status: DISCONTINUED | OUTPATIENT
Start: 2022-12-21 | End: 2022-12-22 | Stop reason: HOSPADM

## 2022-12-21 RX ORDER — ENOXAPARIN SODIUM 100 MG/ML
40 INJECTION SUBCUTANEOUS ONCE
Status: COMPLETED | OUTPATIENT
Start: 2022-12-21 | End: 2022-12-21

## 2022-12-21 RX ORDER — LORAZEPAM 1 MG/1
1 TABLET ORAL EVERY 12 HOURS PRN
Status: DISCONTINUED | OUTPATIENT
Start: 2022-12-21 | End: 2022-12-22 | Stop reason: HOSPADM

## 2022-12-21 RX ORDER — ONDANSETRON 4 MG/1
4 TABLET, FILM COATED ORAL EVERY 4 HOURS PRN
Status: DISCONTINUED | OUTPATIENT
Start: 2022-12-21 | End: 2022-12-22 | Stop reason: HOSPADM

## 2022-12-21 RX ORDER — MIDAZOLAM HYDROCHLORIDE 1 MG/ML
1 INJECTION INTRAMUSCULAR; INTRAVENOUS
Status: COMPLETED | OUTPATIENT
Start: 2022-12-21 | End: 2022-12-21

## 2022-12-21 RX ORDER — PHENYLEPHRINE HCL IN 0.9% NACL 1 MG/10 ML
SYRINGE (ML) INTRAVENOUS AS NEEDED
Status: DISCONTINUED | OUTPATIENT
Start: 2022-12-21 | End: 2022-12-21

## 2022-12-21 RX ORDER — ONDANSETRON 2 MG/ML
4 INJECTION INTRAMUSCULAR; INTRAVENOUS ONCE AS NEEDED
Status: DISCONTINUED | OUTPATIENT
Start: 2022-12-21 | End: 2022-12-21 | Stop reason: HOSPADM

## 2022-12-21 RX ORDER — ALBUTEROL SULFATE 2.5 MG/3ML
2.5 SOLUTION RESPIRATORY (INHALATION) EVERY 4 HOURS PRN
Status: DISCONTINUED | OUTPATIENT
Start: 2022-12-21 | End: 2022-12-22 | Stop reason: HOSPADM

## 2022-12-21 RX ORDER — MIRTAZAPINE 15 MG/1
15 TABLET, ORALLY DISINTEGRATING ORAL NIGHTLY
Status: DISCONTINUED | OUTPATIENT
Start: 2022-12-21 | End: 2022-12-22 | Stop reason: HOSPADM

## 2022-12-21 RX ORDER — ONDANSETRON 4 MG/1
4 TABLET, ORALLY DISINTEGRATING ORAL EVERY 4 HOURS PRN
Status: DISCONTINUED | OUTPATIENT
Start: 2022-12-21 | End: 2022-12-22 | Stop reason: HOSPADM

## 2022-12-21 RX ORDER — HYDROCHLOROTHIAZIDE 12.5 MG/1
12.5 TABLET ORAL
Status: DISCONTINUED | OUTPATIENT
Start: 2022-12-21 | End: 2022-12-22

## 2022-12-21 RX ORDER — ONDANSETRON 2 MG/ML
4 INJECTION INTRAMUSCULAR; INTRAVENOUS EVERY 4 HOURS PRN
Status: DISCONTINUED | OUTPATIENT
Start: 2022-12-21 | End: 2022-12-22 | Stop reason: HOSPADM

## 2022-12-21 RX ORDER — FENTANYL CITRATE 50 UG/ML
50 INJECTION, SOLUTION INTRAMUSCULAR; INTRAVENOUS
Status: DISCONTINUED | OUTPATIENT
Start: 2022-12-21 | End: 2022-12-21 | Stop reason: HOSPADM

## 2022-12-21 RX ORDER — FENTANYL CITRATE 50 UG/ML
INJECTION, SOLUTION INTRAMUSCULAR; INTRAVENOUS AS NEEDED
Status: DISCONTINUED | OUTPATIENT
Start: 2022-12-21 | End: 2022-12-21 | Stop reason: SURG

## 2022-12-21 RX ORDER — SODIUM CHLORIDE, SODIUM LACTATE, POTASSIUM CHLORIDE, CALCIUM CHLORIDE 600; 310; 30; 20 MG/100ML; MG/100ML; MG/100ML; MG/100ML
9 INJECTION, SOLUTION INTRAVENOUS CONTINUOUS
Status: DISCONTINUED | OUTPATIENT
Start: 2022-12-21 | End: 2022-12-22 | Stop reason: HOSPADM

## 2022-12-21 RX ORDER — PROMETHAZINE HYDROCHLORIDE 25 MG/1
12.5 TABLET ORAL ONCE AS NEEDED
Status: DISCONTINUED | OUTPATIENT
Start: 2022-12-21 | End: 2022-12-21 | Stop reason: HOSPADM

## 2022-12-21 RX ORDER — PROPOFOL 10 MG/ML
VIAL (ML) INTRAVENOUS AS NEEDED
Status: DISCONTINUED | OUTPATIENT
Start: 2022-12-21 | End: 2022-12-21 | Stop reason: SURG

## 2022-12-21 RX ORDER — HYDROMORPHONE HYDROCHLORIDE 1 MG/ML
0.5 INJECTION, SOLUTION INTRAMUSCULAR; INTRAVENOUS; SUBCUTANEOUS
Status: DISCONTINUED | OUTPATIENT
Start: 2022-12-21 | End: 2022-12-21 | Stop reason: HOSPADM

## 2022-12-21 RX ORDER — GABAPENTIN 250 MG/5ML
300 SOLUTION ORAL EVERY 8 HOURS
Status: DISCONTINUED | OUTPATIENT
Start: 2022-12-21 | End: 2022-12-22 | Stop reason: HOSPADM

## 2022-12-21 RX ORDER — GABAPENTIN 250 MG/5ML
300 SOLUTION ORAL ONCE
Status: COMPLETED | OUTPATIENT
Start: 2022-12-21 | End: 2022-12-21

## 2022-12-21 RX ORDER — PROMETHAZINE HYDROCHLORIDE 25 MG/1
25 TABLET ORAL ONCE AS NEEDED
Status: DISCONTINUED | OUTPATIENT
Start: 2022-12-21 | End: 2022-12-21 | Stop reason: HOSPADM

## 2022-12-21 RX ORDER — ACETAMINOPHEN 160 MG/5ML
975 SOLUTION ORAL EVERY 6 HOURS
Status: DISCONTINUED | OUTPATIENT
Start: 2022-12-21 | End: 2022-12-22 | Stop reason: HOSPADM

## 2022-12-21 RX ORDER — SODIUM CHLORIDE 0.9 % (FLUSH) 0.9 %
3 SYRINGE (ML) INJECTION EVERY 12 HOURS SCHEDULED
Status: DISCONTINUED | OUTPATIENT
Start: 2022-12-21 | End: 2022-12-21 | Stop reason: HOSPADM

## 2022-12-21 RX ORDER — SODIUM CHLORIDE 0.9 % (FLUSH) 0.9 %
3 SYRINGE (ML) INJECTION EVERY 12 HOURS SCHEDULED
Status: DISCONTINUED | OUTPATIENT
Start: 2022-12-21 | End: 2022-12-22 | Stop reason: HOSPADM

## 2022-12-21 RX ORDER — NALOXONE HCL 0.4 MG/ML
0.1 VIAL (ML) INJECTION
Status: DISCONTINUED | OUTPATIENT
Start: 2022-12-21 | End: 2022-12-22 | Stop reason: HOSPADM

## 2022-12-21 RX ORDER — MAGNESIUM HYDROXIDE 1200 MG/15ML
LIQUID ORAL AS NEEDED
Status: DISCONTINUED | OUTPATIENT
Start: 2022-12-21 | End: 2022-12-21 | Stop reason: HOSPADM

## 2022-12-21 RX ORDER — DEXAMETHASONE SODIUM PHOSPHATE 4 MG/ML
INJECTION, SOLUTION INTRA-ARTICULAR; INTRALESIONAL; INTRAMUSCULAR; INTRAVENOUS; SOFT TISSUE AS NEEDED
Status: DISCONTINUED | OUTPATIENT
Start: 2022-12-21 | End: 2022-12-21 | Stop reason: SURG

## 2022-12-21 RX ORDER — ENOXAPARIN SODIUM 100 MG/ML
40 INJECTION SUBCUTANEOUS ONCE
Status: COMPLETED | OUTPATIENT
Start: 2022-12-22 | End: 2022-12-22

## 2022-12-21 RX ORDER — PROMETHAZINE HYDROCHLORIDE 12.5 MG/1
12.5 TABLET ORAL EVERY 4 HOURS PRN
Status: DISCONTINUED | OUTPATIENT
Start: 2022-12-21 | End: 2022-12-22 | Stop reason: HOSPADM

## 2022-12-21 RX ORDER — ONDANSETRON 2 MG/ML
INJECTION INTRAMUSCULAR; INTRAVENOUS AS NEEDED
Status: DISCONTINUED | OUTPATIENT
Start: 2022-12-21 | End: 2022-12-21 | Stop reason: SURG

## 2022-12-21 RX ORDER — DIPHENHYDRAMINE HYDROCHLORIDE 50 MG/ML
12.5 INJECTION INTRAMUSCULAR; INTRAVENOUS
Status: DISCONTINUED | OUTPATIENT
Start: 2022-12-21 | End: 2022-12-21 | Stop reason: HOSPADM

## 2022-12-21 RX ORDER — ACETAMINOPHEN 500 MG
1000 TABLET ORAL EVERY 6 HOURS
Status: DISCONTINUED | OUTPATIENT
Start: 2022-12-21 | End: 2022-12-22 | Stop reason: HOSPADM

## 2022-12-21 RX ORDER — SODIUM CHLORIDE 0.9 % (FLUSH) 0.9 %
3-10 SYRINGE (ML) INJECTION AS NEEDED
Status: DISCONTINUED | OUTPATIENT
Start: 2022-12-21 | End: 2022-12-21 | Stop reason: HOSPADM

## 2022-12-21 RX ORDER — SCOLOPAMINE TRANSDERMAL SYSTEM 1 MG/1
1 PATCH, EXTENDED RELEASE TRANSDERMAL CONTINUOUS
Status: DISCONTINUED | OUTPATIENT
Start: 2022-12-21 | End: 2022-12-22 | Stop reason: HOSPADM

## 2022-12-21 RX ORDER — METOCLOPRAMIDE HYDROCHLORIDE 5 MG/ML
10 INJECTION INTRAMUSCULAR; INTRAVENOUS EVERY 6 HOURS
Status: DISCONTINUED | OUTPATIENT
Start: 2022-12-21 | End: 2022-12-22 | Stop reason: HOSPADM

## 2022-12-21 RX ORDER — ROCURONIUM BROMIDE 10 MG/ML
INJECTION, SOLUTION INTRAVENOUS AS NEEDED
Status: DISCONTINUED | OUTPATIENT
Start: 2022-12-21 | End: 2022-12-21 | Stop reason: SURG

## 2022-12-21 RX ORDER — CEFAZOLIN SODIUM IN 0.9 % NACL 3 G/100 ML
3 INTRAVENOUS SOLUTION, PIGGYBACK (ML) INTRAVENOUS ONCE
Status: COMPLETED | OUTPATIENT
Start: 2022-12-21 | End: 2022-12-21

## 2022-12-21 RX ORDER — PROMETHAZINE HYDROCHLORIDE 25 MG/1
25 SUPPOSITORY RECTAL ONCE AS NEEDED
Status: DISCONTINUED | OUTPATIENT
Start: 2022-12-21 | End: 2022-12-21 | Stop reason: HOSPADM

## 2022-12-21 RX ORDER — SODIUM CHLORIDE, SODIUM LACTATE, POTASSIUM CHLORIDE, CALCIUM CHLORIDE 600; 310; 30; 20 MG/100ML; MG/100ML; MG/100ML; MG/100ML
100 INJECTION, SOLUTION INTRAVENOUS CONTINUOUS
Status: DISCONTINUED | OUTPATIENT
Start: 2022-12-21 | End: 2022-12-22 | Stop reason: HOSPADM

## 2022-12-21 RX ORDER — CHLORHEXIDINE GLUCONATE 0.12 MG/ML
15 RINSE ORAL SEE ADMIN INSTRUCTIONS
Status: COMPLETED | OUTPATIENT
Start: 2022-12-21 | End: 2022-12-21

## 2022-12-21 RX ORDER — LIDOCAINE HYDROCHLORIDE 20 MG/ML
INJECTION, SOLUTION INFILTRATION; PERINEURAL AS NEEDED
Status: DISCONTINUED | OUTPATIENT
Start: 2022-12-21 | End: 2022-12-21 | Stop reason: SURG

## 2022-12-21 RX ORDER — PHENYLEPHRINE HCL IN 0.9% NACL 1 MG/10 ML
SYRINGE (ML) INTRAVENOUS AS NEEDED
Status: DISCONTINUED | OUTPATIENT
Start: 2022-12-21 | End: 2022-12-21 | Stop reason: SURG

## 2022-12-21 RX ORDER — GABAPENTIN 300 MG/1
300 CAPSULE ORAL EVERY 8 HOURS
Status: DISCONTINUED | OUTPATIENT
Start: 2022-12-21 | End: 2022-12-22 | Stop reason: HOSPADM

## 2022-12-21 RX ORDER — HYDROMORPHONE HYDROCHLORIDE 2 MG/1
2 TABLET ORAL EVERY 4 HOURS PRN
Status: DISCONTINUED | OUTPATIENT
Start: 2022-12-21 | End: 2022-12-22 | Stop reason: HOSPADM

## 2022-12-21 RX ORDER — ACETAMINOPHEN 10 MG/ML
1000 INJECTION, SOLUTION INTRAVENOUS ONCE
Status: DISCONTINUED | OUTPATIENT
Start: 2022-12-21 | End: 2022-12-22 | Stop reason: HOSPADM

## 2022-12-21 RX ORDER — SODIUM CHLORIDE 9 MG/ML
40 INJECTION, SOLUTION INTRAVENOUS AS NEEDED
Status: DISCONTINUED | OUTPATIENT
Start: 2022-12-21 | End: 2022-12-21 | Stop reason: HOSPADM

## 2022-12-21 RX ORDER — KETAMINE HCL IN NACL, ISO-OSM 100MG/10ML
SYRINGE (ML) INJECTION AS NEEDED
Status: DISCONTINUED | OUTPATIENT
Start: 2022-12-21 | End: 2022-12-21 | Stop reason: SURG

## 2022-12-21 RX ORDER — NALOXONE HCL 0.4 MG/ML
0.4 VIAL (ML) INJECTION AS NEEDED
Status: DISCONTINUED | OUTPATIENT
Start: 2022-12-21 | End: 2022-12-21 | Stop reason: HOSPADM

## 2022-12-21 RX ORDER — OXYCODONE AND ACETAMINOPHEN 7.5; 325 MG/1; MG/1
1 TABLET ORAL ONCE AS NEEDED
Status: DISCONTINUED | OUTPATIENT
Start: 2022-12-21 | End: 2022-12-21 | Stop reason: HOSPADM

## 2022-12-21 RX ORDER — DIPHENHYDRAMINE HYDROCHLORIDE 50 MG/ML
25 INJECTION INTRAMUSCULAR; INTRAVENOUS EVERY 4 HOURS PRN
Status: DISCONTINUED | OUTPATIENT
Start: 2022-12-21 | End: 2022-12-22 | Stop reason: HOSPADM

## 2022-12-21 RX ORDER — METOCLOPRAMIDE HYDROCHLORIDE 5 MG/ML
10 INJECTION INTRAMUSCULAR; INTRAVENOUS ONCE
Status: COMPLETED | OUTPATIENT
Start: 2022-12-21 | End: 2022-12-21

## 2022-12-21 RX ORDER — LABETALOL HYDROCHLORIDE 5 MG/ML
5 INJECTION, SOLUTION INTRAVENOUS
Status: DISCONTINUED | OUTPATIENT
Start: 2022-12-21 | End: 2022-12-21 | Stop reason: HOSPADM

## 2022-12-21 RX ORDER — PANTOPRAZOLE SODIUM 40 MG/10ML
40 INJECTION, POWDER, LYOPHILIZED, FOR SOLUTION INTRAVENOUS ONCE
Status: COMPLETED | OUTPATIENT
Start: 2022-12-21 | End: 2022-12-21

## 2022-12-21 RX ORDER — MAGNESIUM SULFATE HEPTAHYDRATE 500 MG/ML
INJECTION, SOLUTION INTRAMUSCULAR; INTRAVENOUS AS NEEDED
Status: DISCONTINUED | OUTPATIENT
Start: 2022-12-21 | End: 2022-12-21 | Stop reason: SURG

## 2022-12-21 RX ORDER — ACETAMINOPHEN 10 MG/ML
INJECTION, SOLUTION INTRAVENOUS AS NEEDED
Status: DISCONTINUED | OUTPATIENT
Start: 2022-12-21 | End: 2022-12-21 | Stop reason: SURG

## 2022-12-21 RX ORDER — SODIUM CHLORIDE, SODIUM LACTATE, POTASSIUM CHLORIDE, CALCIUM CHLORIDE 600; 310; 30; 20 MG/100ML; MG/100ML; MG/100ML; MG/100ML
150 INJECTION, SOLUTION INTRAVENOUS CONTINUOUS
Status: DISCONTINUED | OUTPATIENT
Start: 2022-12-21 | End: 2022-12-22 | Stop reason: HOSPADM

## 2022-12-21 RX ORDER — HYDROMORPHONE HYDROCHLORIDE 1 MG/ML
0.5 INJECTION, SOLUTION INTRAMUSCULAR; INTRAVENOUS; SUBCUTANEOUS
Status: DISCONTINUED | OUTPATIENT
Start: 2022-12-21 | End: 2022-12-22 | Stop reason: HOSPADM

## 2022-12-21 RX ORDER — GLYCOPYRROLATE 0.2 MG/ML
INJECTION INTRAMUSCULAR; INTRAVENOUS AS NEEDED
Status: DISCONTINUED | OUTPATIENT
Start: 2022-12-21 | End: 2022-12-21 | Stop reason: SURG

## 2022-12-21 RX ORDER — ENALAPRILAT 2.5 MG/2ML
1.25 INJECTION INTRAVENOUS ONCE AS NEEDED
Status: DISCONTINUED | OUTPATIENT
Start: 2022-12-21 | End: 2022-12-21 | Stop reason: HOSPADM

## 2022-12-21 RX ORDER — PROCHLORPERAZINE EDISYLATE 5 MG/ML
10 INJECTION INTRAMUSCULAR; INTRAVENOUS EVERY 6 HOURS PRN
Status: DISCONTINUED | OUTPATIENT
Start: 2022-12-21 | End: 2022-12-22 | Stop reason: HOSPADM

## 2022-12-21 RX ADMIN — GABAPENTIN 300 MG: 250 SOLUTION ORAL at 17:32

## 2022-12-21 RX ADMIN — SODIUM CHLORIDE, POTASSIUM CHLORIDE, SODIUM LACTATE AND CALCIUM CHLORIDE 150 ML/HR: 600; 310; 30; 20 INJECTION, SOLUTION INTRAVENOUS at 22:41

## 2022-12-21 RX ADMIN — ONDANSETRON 4 MG: 2 INJECTION INTRAMUSCULAR; INTRAVENOUS at 13:56

## 2022-12-21 RX ADMIN — ACETAMINOPHEN 975 MG: 325 SUSPENSION ORAL at 17:31

## 2022-12-21 RX ADMIN — SODIUM CHLORIDE, POTASSIUM CHLORIDE, SODIUM LACTATE AND CALCIUM CHLORIDE: 600; 310; 30; 20 INJECTION, SOLUTION INTRAVENOUS at 14:09

## 2022-12-21 RX ADMIN — CHLORHEXIDINE GLUCONATE 15 ML: 1.2 SOLUTION ORAL at 11:59

## 2022-12-21 RX ADMIN — PROPOFOL 200 MG: 10 INJECTION, EMULSION INTRAVENOUS at 13:29

## 2022-12-21 RX ADMIN — PROPOFOL 200 MCG/KG/MIN: 10 INJECTION, EMULSION INTRAVENOUS at 13:35

## 2022-12-21 RX ADMIN — Medication 100 MCG: at 14:00

## 2022-12-21 RX ADMIN — DEXAMETHASONE SODIUM PHOSPHATE 8 MG: 4 INJECTION, SOLUTION INTRAMUSCULAR; INTRAVENOUS at 13:34

## 2022-12-21 RX ADMIN — MAGNESIUM SULFATE HEPTAHYDRATE 2 G: 500 INJECTION, SOLUTION INTRAMUSCULAR; INTRAVENOUS at 13:34

## 2022-12-21 RX ADMIN — METOCLOPRAMIDE 10 MG: 5 INJECTION, SOLUTION INTRAMUSCULAR; INTRAVENOUS at 17:31

## 2022-12-21 RX ADMIN — GLYCOPYRROLATE 0.2 MCG: 1 INJECTION INTRAMUSCULAR; INTRAVENOUS at 13:29

## 2022-12-21 RX ADMIN — ACETAMINOPHEN 1000 MG: 10 INJECTION, SOLUTION INTRAVENOUS at 13:34

## 2022-12-21 RX ADMIN — FAMOTIDINE 20 MG: 10 INJECTION INTRAVENOUS at 20:38

## 2022-12-21 RX ADMIN — FENTANYL CITRATE 50 MCG: 50 INJECTION, SOLUTION INTRAMUSCULAR; INTRAVENOUS at 15:37

## 2022-12-21 RX ADMIN — ENOXAPARIN SODIUM 40 MG: 100 INJECTION SUBCUTANEOUS at 15:20

## 2022-12-21 RX ADMIN — GABAPENTIN 300 MG: 250 SOLUTION ORAL at 11:52

## 2022-12-21 RX ADMIN — MIRTAZAPINE 15 MG: 15 TABLET, ORALLY DISINTEGRATING ORAL at 20:38

## 2022-12-21 RX ADMIN — HYDROMORPHONE HYDROCHLORIDE 2 MG: 2 TABLET ORAL at 20:44

## 2022-12-21 RX ADMIN — ROCURONIUM BROMIDE 50 MG: 10 INJECTION, SOLUTION INTRAVENOUS at 13:30

## 2022-12-21 RX ADMIN — CEFAZOLIN 3 G: 10 INJECTION, POWDER, FOR SOLUTION INTRAVENOUS at 13:12

## 2022-12-21 RX ADMIN — HYDROMORPHONE HYDROCHLORIDE 0.5 MG: 1 INJECTION, SOLUTION INTRAMUSCULAR; INTRAVENOUS; SUBCUTANEOUS at 15:32

## 2022-12-21 RX ADMIN — Medication 100 MCG: at 13:50

## 2022-12-21 RX ADMIN — FENTANYL CITRATE 50 MCG: 50 INJECTION INTRAMUSCULAR; INTRAVENOUS at 13:41

## 2022-12-21 RX ADMIN — SODIUM CHLORIDE, POTASSIUM CHLORIDE, SODIUM LACTATE AND CALCIUM CHLORIDE 500 ML: 600; 310; 30; 20 INJECTION, SOLUTION INTRAVENOUS at 11:47

## 2022-12-21 RX ADMIN — Medication 100 MCG: at 13:45

## 2022-12-21 RX ADMIN — FENTANYL CITRATE 50 MCG: 50 INJECTION INTRAMUSCULAR; INTRAVENOUS at 13:31

## 2022-12-21 RX ADMIN — SODIUM CHLORIDE, POTASSIUM CHLORIDE, SODIUM LACTATE AND CALCIUM CHLORIDE 150 ML/HR: 600; 310; 30; 20 INJECTION, SOLUTION INTRAVENOUS at 17:31

## 2022-12-21 RX ADMIN — HYDROCHLOROTHIAZIDE 12.5 MG: 12.5 TABLET ORAL at 20:44

## 2022-12-21 RX ADMIN — Medication 3 ML: at 20:38

## 2022-12-21 RX ADMIN — FENTANYL CITRATE 50 MCG: 50 INJECTION, SOLUTION INTRAMUSCULAR; INTRAVENOUS at 15:15

## 2022-12-21 RX ADMIN — Medication 20 MG: at 13:29

## 2022-12-21 RX ADMIN — LISINOPRIL 20 MG: 20 TABLET ORAL at 20:44

## 2022-12-21 RX ADMIN — PANTOPRAZOLE SODIUM 40 MG: 40 INJECTION, POWDER, FOR SOLUTION INTRAVENOUS at 12:00

## 2022-12-21 RX ADMIN — MIDAZOLAM 1 MG: 1 INJECTION INTRAMUSCULAR; INTRAVENOUS at 12:01

## 2022-12-21 RX ADMIN — METOCLOPRAMIDE 10 MG: 5 INJECTION, SOLUTION INTRAMUSCULAR; INTRAVENOUS at 11:53

## 2022-12-21 RX ADMIN — SUGAMMADEX 400 MG: 100 INJECTION, SOLUTION INTRAVENOUS at 14:21

## 2022-12-21 RX ADMIN — LIDOCAINE HYDROCHLORIDE 50 MG: 20 INJECTION, SOLUTION INFILTRATION; PERINEURAL at 13:29

## 2022-12-21 RX ADMIN — SCOPALAMINE 1 PATCH: 1 PATCH, EXTENDED RELEASE TRANSDERMAL at 11:45

## 2022-12-21 RX ADMIN — MIDAZOLAM 1 MG: 1 INJECTION INTRAMUSCULAR; INTRAVENOUS at 13:12

## 2022-12-21 NOTE — ANESTHESIA POSTPROCEDURE EVALUATION
Patient: Yancy MARVIN Tepatlan    Procedure Summary     Date: 12/21/22 Room / Location:  [facility]    Anesthesia Start: 1316 Anesthesia Stop: 1447    Procedure: GASTRIC SLEEVE LAPAROSCOPIC WITH ROBOTIC, PARAESPHAGEAL HERRNIA REPAIR, LYSIS OF ADHESIONS (Abdomen) Diagnosis:       Class 3 severe obesity due to excess calories with serious comorbidity and body mass index (BMI) of 60.0 to 69.9 in adult (HCC)      (Class 3 severe obesity due to excess calories with serious comorbidity and body mass index (BMI) of 60.0 to 69.9 in adult (HCC) [E66.01, Z68.44])    Surgeons: Kevin Fuentes Jr., MD Provider: Aneesh Delgado MD    Anesthesia Type: general ASA Status: 3          Anesthesia Type: general    Vitals  Vitals Value Taken Time   /90 12/21/22 1500   Temp     Pulse 88 12/21/22 1512   Resp     SpO2 99 % 12/21/22 1512   Vitals shown include unvalidated device data.        Post Anesthesia Care and Evaluation    Patient location during evaluation: PACU  Patient participation: complete - patient participated  Level of consciousness: awake  Pain management: adequate    Airway patency: patent  Anesthetic complications: No anesthetic complications    Cardiovascular status: acceptable  Respiratory status: acceptable  Hydration status: acceptable    Comments: /98 (BP Location: Right arm, Patient Position: Sitting)   Pulse 79   Temp 36.7 °C (98.1 °F) (Oral)   Resp 16   LMP  (LMP Unknown) Comment: last period 7 years ago, spots at times  SpO2 98%

## 2022-12-21 NOTE — PLAN OF CARE
Goal Outcome Evaluation:           Progress: no change  Outcome Evaluation: Arrived from PACU very drowsy and oriented x4, on 2L O2, IVFs as ordered, SCDs on, accumax ordered, VSS, lap sites x5 CDI and approximated w/ dermabond, ambulated from stretcher to bed, due to void, family at bedside, slept on and off between care.

## 2022-12-21 NOTE — ANESTHESIA PROCEDURE NOTES
Airway  Urgency: elective    Date/Time: 12/21/2022 1:32 PM  Airway not difficult    General Information and Staff    Patient location during procedure: OR  Anesthesiologist: Aneesh Delgado MD  CRNA/CAA: Flor Alexander CRNA    Indications and Patient Condition    Preoxygenated: yes  Mask difficulty assessment: 1 - vent by mask    Final Airway Details  Final airway type: endotracheal airway      Successful airway: ETT  Cuffed: yes   Successful intubation technique: direct laryngoscopy  Facilitating devices/methods: intubating stylet  Endotracheal tube insertion site: oral  Blade: Lesvia  Blade size: 4  ETT size (mm): 7.0  Cormack-Lehane Classification: grade IIb - view of arytenoids or posterior of glottis only  Placement verified by: chest auscultation and capnometry   Measured from: teeth  ETT/EBT  to teeth (cm): 20  Number of attempts at approach: 1  Assessment: lips, teeth, and gum same as pre-op and atraumatic intubation

## 2022-12-21 NOTE — OP NOTE
PREOPERATIVE DIAGNOSIS:  Morbid obesity with multiple comorbidities as referenced in the most recent history and physical.    POSTOPERATIVE DIAGNOSIS:  Morbid obesity with multiple comorbidities as referenced in the most recent history and physical. Paraesophageal hernia    PROCEDURES PERFORMED:  1.  Robotic assisted laparoscopic sleeve gastrectomy with Titan Stapler # [id number]  2.  Robotic paraesophageal hernia repair  2.  Tisseel application  3.  Lysis adhesions    SURGEON:  Kevin Fuentes Jr. FACS, FASMBS    ASSISTANT: SHEYLA Farias      Surgery assisted and facilitated by a certified physician assistant, who directly resulted in a decreased operative time, anesthetic time, wound exposure, and possibly of an operative wound infection, thereby decreasing patient morbidity and ultimately total expenditures.  The surgical assistant assisted in placement of trochars, take down of the gastrocolic omentum, short gastric vessels and dissection at the angle of His.  Also assisted in retraction of the stomach during stapling so as not to kink the gastric sleeve.  Also assisted in removing of the gastric specimen, closure of the fascial defect as well as closure of the skin incisions.    ANESTHESIA:  General endotracheal and TAP block with Ropivacaine mixture    ESTIMATED BLOOD LOSS:   Less than 25 mL unless dictated below.    FLUIDS:  Crystalloids.    SPECIMENS:  Gastric remnant    DRAINS:  None.    COUNTS:  Correct.    COMPLICATIONS:  None.    INDICATIONS:  This patient with morbid obesity and associated comorbidities presents for elective laparoscopic,robotic, possible open sleeve gastrectomy.  The patient has received medical clearance to proceed.  The patient has undergone our extensive educational process and consent process and wishes to proceed.    DESCRIPTION OF PROCEDURE:  The patient was brought to the operating room and placed supine upon the operating room table. SCD hose were placed.  The patient  underwent uneventful general endotracheal anesthesia per the anesthesiology staff. The abdomen was prepped with ChloraPrep and draped in the usual sterile fashion. Anesthesia staff passed a 38-Djiboutian bougie into the stomach to decompress the stomach and then was pulled back to the esophagus.      An 8 mm transverse incision was made just to the left of midline.  The peritoneal cavity was entered under direct camera visualization using a 5  mm 0° laparoscope and an Optiview trocar.  The abdomen was then insufflated to a pressure of 15 mmHg with CO2 gas.  Exploratory laparoscopy revealed no evidence of injury from the entrance technique and no significant abnormalities unless addendum dictated below.  An angled laparoscope was then used.  The patient was placed in reverse Trendelenburg position.  Under direct camera visualization two 8 mm trocar was placed in the left lateral midabdominal position.  A 12 mm trocar was placed in the right abdomen.  A Joaquina retractor was placed through an epigastric incision and used to elevate the left lobe of the liver.      Next the 30 degree 8 mm scope was brought into the 8 mm port just to the left of the umbilicus after the corresponding trocar was docked to the da Tameka robot.  Targeting then took place and then the rest of the trochars were docked to the robot and angled into position.  Instruments were brought in through the trochars in place for laparoscopic view.       I then took control of the surgical console. At this point, approximately senior living along the greater curvature, the gastrocolic omentum was divided with the Vessel Sealer and this proceeded superiorly to the angle of His taking down the short gastric vessels.  All posterior attachments of the lesser sac and posterior aspect of the stomach to the pancreas were taken down as well.      Dissection then proceeded medially taking down the greater curvature with the vessel sealer to just proximal to the pylorus.   The 38-Surinamese bougie was passed back down into the stomach to aid in sizing the sleeve.       The right 12 mm intuitive trocar was removed and replaced with the 19 mm trocar.  The stomach was marked with indelible ink 3 cm at the incisura and approximately 4 to 5 cm from the pylorus.  The Titan stapler was advanced into the abdomen and placed into position and used to create the gastric sleeve.  The stapler was then removed after firing.    Insufflation of the stomach under water was performed and there was no evidence of leak.    Tisseel was then sprayed on the staple line.    The specimen was removed through the 19 mm port site.  The liver retractor was removed. The fascia at the 19 mm trocar site incision that was used for extraction was closed with a single 0 Vicryl suture in a figure-of-eight fashion placed under direct laparoscopic camera visualization with a suture passer and tying the knot extracorporeally.  The fascia in the area was infiltrated with local anesthesia. All incisions were then infiltrated with local anesthetic. The remaining trocars were removed under direct camera visualization with no bleeding noted from their sites.  The abdomen was desufflated of gas. The skin in each incision was closed using 4-0 antibiotic impregnated Monocryl in a subcuticular fashion followed by Dermabond.  The patient tolerated the procedure well without complication and was taken to the recovery room in stable condition.  All sponge, needle and instrument counts were correct.     The patient was noted to have a paraesophageal hernia.  The phrenoesophageal membrane was opened up with electrocautery and the right and left crura were cleaned off using sharp and blunt dissection.  The peritoneum overlying the right db was incised and the plane along the hernia sac was developed.  The dissection then extended anteriorly and laterally to the left db.  The base of the crural confluence was dissected free of adhesions  to the hernia sac.  The hernia sac was carefully dissected into the mediastinum with caudal traction.  The interfaces between the pleura, pericardium, spine, and aorta were developed as a dissection was carried cephalically to the top of the hernia sac.  Sac contents were completely reduced back into the abdominal cavity.  Careful attention was made not to injure the vagus nerve.  The esophagus was identified and dissected circumferentially and along its previous stomach course in order to reduce tension, allowing the gastroesophageal junction to rest comfortably within the abdominal cavity.  The gastrosplenic ligament and the short gastric vessels were divided with the Vessel sealer device.  The retroesophageal window was developed and the esophagus was retracted caudally.  The crural pillars were then approximated with a 0-silk suture.  Anterior reinforcement was performed as well if needed.    Adhesions epigastrum taken down with vessel sealer.     no

## 2022-12-21 NOTE — ANESTHESIA PREPROCEDURE EVALUATION
Anesthesia Evaluation     Patient summary reviewed and Nursing notes reviewed   NPO Solid Status: > 8 hours  NPO Liquid Status: > 6 hours           Airway   Mallampati: III  TM distance: >3 FB  Neck ROM: full  Possible difficult intubation and Large neck circumference  Dental    (+) edentulous    Pulmonary - normal exam   (+) sleep apnea on CPAP,   Cardiovascular - normal exam    ECG reviewed    (+) hypertension 2 medications or greater, DVT resolved, hyperlipidemia,     ROS comment: Normal stress ECHO 10/22    Sinus rhythm  Probable left atrial enlargement  Borderline left axis deviation  Anteroseptal infarct, age indeterminate  When compared with ECG of 20-May-2021 11:53:38,  No significant change    Neuro/Psych  (+) headaches (Migraine), numbness, psychiatric history,      ROS Comment: CTS/migraines  GI/Hepatic/Renal/Endo    (+) morbid obesity, hiatal hernia, GERD,  liver disease fatty liver disease,     Musculoskeletal     Abdominal   (+) obese,    Substance History      OB/GYN          Other   arthritis,                      Anesthesia Plan    ASA 3     general   total IV anesthesia  intravenous induction     Anesthetic plan, risks, benefits, and alternatives have been provided, discussed and informed consent has been obtained with: patient.        CODE STATUS:

## 2022-12-22 VITALS
WEIGHT: 293 LBS | OXYGEN SATURATION: 94 % | BODY MASS INDEX: 61.37 KG/M2 | DIASTOLIC BLOOD PRESSURE: 84 MMHG | SYSTOLIC BLOOD PRESSURE: 133 MMHG | TEMPERATURE: 98.5 F | RESPIRATION RATE: 18 BRPM | HEART RATE: 83 BPM

## 2022-12-22 LAB
ALBUMIN SERPL-MCNC: 3.9 G/DL (ref 3.5–5.2)
ALBUMIN/GLOB SERPL: 1 G/DL
ALP SERPL-CCNC: 64 U/L (ref 39–117)
ALT SERPL W P-5'-P-CCNC: 58 U/L (ref 1–33)
ANION GAP SERPL CALCULATED.3IONS-SCNC: 5.5 MMOL/L (ref 5–15)
AST SERPL-CCNC: 40 U/L (ref 1–32)
BASOPHILS # BLD AUTO: 0.02 10*3/MM3 (ref 0–0.2)
BASOPHILS NFR BLD AUTO: 0.1 % (ref 0–1.5)
BILIRUB SERPL-MCNC: 0.4 MG/DL (ref 0–1.2)
BUN SERPL-MCNC: 16 MG/DL (ref 6–20)
BUN/CREAT SERPL: 15 (ref 7–25)
CALCIUM SPEC-SCNC: 9.1 MG/DL (ref 8.6–10.5)
CHLORIDE SERPL-SCNC: 100 MMOL/L (ref 98–107)
CO2 SERPL-SCNC: 32.5 MMOL/L (ref 22–29)
CREAT SERPL-MCNC: 1.07 MG/DL (ref 0.57–1)
DEPRECATED RDW RBC AUTO: 39.5 FL (ref 37–54)
EGFRCR SERPLBLD CKD-EPI 2021: 66.6 ML/MIN/1.73
EOSINOPHIL # BLD AUTO: 0 10*3/MM3 (ref 0–0.4)
EOSINOPHIL NFR BLD AUTO: 0 % (ref 0.3–6.2)
ERYTHROCYTE [DISTWIDTH] IN BLOOD BY AUTOMATED COUNT: 12.4 % (ref 12.3–15.4)
GLOBULIN UR ELPH-MCNC: 3.8 GM/DL
GLUCOSE SERPL-MCNC: 138 MG/DL (ref 65–99)
HCT VFR BLD AUTO: 36.8 % (ref 34–46.6)
HGB BLD-MCNC: 11.6 G/DL (ref 12–15.9)
IMM GRANULOCYTES # BLD AUTO: 0.15 10*3/MM3 (ref 0–0.05)
IMM GRANULOCYTES NFR BLD AUTO: 1 % (ref 0–0.5)
LAB AP CASE REPORT: NORMAL
LAB AP DIAGNOSIS COMMENT: NORMAL
LYMPHOCYTES # BLD AUTO: 1.2 10*3/MM3 (ref 0.7–3.1)
LYMPHOCYTES NFR BLD AUTO: 8 % (ref 19.6–45.3)
MAGNESIUM SERPL-MCNC: 2.4 MG/DL (ref 1.6–2.6)
MCH RBC QN AUTO: 27.6 PG (ref 26.6–33)
MCHC RBC AUTO-ENTMCNC: 31.5 G/DL (ref 31.5–35.7)
MCV RBC AUTO: 87.4 FL (ref 79–97)
MONOCYTES # BLD AUTO: 0.54 10*3/MM3 (ref 0.1–0.9)
MONOCYTES NFR BLD AUTO: 3.6 % (ref 5–12)
NEUTROPHILS NFR BLD AUTO: 13.01 10*3/MM3 (ref 1.7–7)
NEUTROPHILS NFR BLD AUTO: 87.3 % (ref 42.7–76)
NRBC BLD AUTO-RTO: 0 /100 WBC (ref 0–0.2)
PATH REPORT.FINAL DX SPEC: NORMAL
PATH REPORT.GROSS SPEC: NORMAL
PHOSPHATE SERPL-MCNC: 3.5 MG/DL (ref 2.5–4.5)
PLATELET # BLD AUTO: 380 10*3/MM3 (ref 140–450)
PMV BLD AUTO: 10.4 FL (ref 6–12)
POTASSIUM SERPL-SCNC: 3.6 MMOL/L (ref 3.5–5.2)
PROT SERPL-MCNC: 7.7 G/DL (ref 6–8.5)
RBC # BLD AUTO: 4.21 10*6/MM3 (ref 3.77–5.28)
SODIUM SERPL-SCNC: 138 MMOL/L (ref 136–145)
WBC NRBC COR # BLD: 14.92 10*3/MM3 (ref 3.4–10.8)

## 2022-12-22 PROCEDURE — 25010000002 ENOXAPARIN PER 10 MG: Performed by: SURGERY

## 2022-12-22 PROCEDURE — 25010000002 THIAMINE PER 100 MG: Performed by: SURGERY

## 2022-12-22 PROCEDURE — 25010000002 CYANOCOBALAMIN PER 1000 MCG: Performed by: SURGERY

## 2022-12-22 PROCEDURE — 80053 COMPREHEN METABOLIC PANEL: CPT | Performed by: SURGERY

## 2022-12-22 PROCEDURE — 84100 ASSAY OF PHOSPHORUS: CPT | Performed by: SURGERY

## 2022-12-22 PROCEDURE — 83735 ASSAY OF MAGNESIUM: CPT | Performed by: SURGERY

## 2022-12-22 PROCEDURE — 85025 COMPLETE CBC W/AUTO DIFF WBC: CPT | Performed by: SURGERY

## 2022-12-22 PROCEDURE — 25010000002 METOCLOPRAMIDE PER 10 MG: Performed by: SURGERY

## 2022-12-22 RX ORDER — HYDROMORPHONE HYDROCHLORIDE 2 MG/1
2 TABLET ORAL EVERY 4 HOURS PRN
Qty: 10 TABLET | Refills: 0 | Status: SHIPPED | OUTPATIENT
Start: 2022-12-22 | End: 2023-01-23

## 2022-12-22 RX ORDER — ONDANSETRON 4 MG/1
4 TABLET, ORALLY DISINTEGRATING ORAL EVERY 4 HOURS PRN
Qty: 14 TABLET | Refills: 0 | Status: SHIPPED | OUTPATIENT
Start: 2022-12-22 | End: 2023-01-23

## 2022-12-22 RX ORDER — ENOXAPARIN SODIUM 100 MG/ML
40 INJECTION SUBCUTANEOUS
Qty: 5.6 ML | Refills: 0
Start: 2022-12-22 | End: 2023-01-05

## 2022-12-22 RX ADMIN — GABAPENTIN 300 MG: 300 CAPSULE ORAL at 10:46

## 2022-12-22 RX ADMIN — METOCLOPRAMIDE 10 MG: 5 INJECTION, SOLUTION INTRAMUSCULAR; INTRAVENOUS at 00:25

## 2022-12-22 RX ADMIN — LISINOPRIL 20 MG: 20 TABLET ORAL at 08:29

## 2022-12-22 RX ADMIN — ACETAMINOPHEN 1000 MG: 500 TABLET ORAL at 00:25

## 2022-12-22 RX ADMIN — ACETAMINOPHEN 1000 MG: 500 TABLET ORAL at 05:43

## 2022-12-22 RX ADMIN — FAMOTIDINE 20 MG: 10 INJECTION INTRAVENOUS at 08:30

## 2022-12-22 RX ADMIN — METOCLOPRAMIDE 10 MG: 5 INJECTION, SOLUTION INTRAMUSCULAR; INTRAVENOUS at 05:44

## 2022-12-22 RX ADMIN — FOLIC ACID 250 ML/HR: 5 INJECTION, SOLUTION INTRAMUSCULAR; INTRAVENOUS; SUBCUTANEOUS at 00:25

## 2022-12-22 RX ADMIN — Medication 3 ML: at 08:30

## 2022-12-22 RX ADMIN — CYANOCOBALAMIN 1000 MCG: 1000 INJECTION, SOLUTION INTRAMUSCULAR; SUBCUTANEOUS at 08:30

## 2022-12-22 RX ADMIN — ENOXAPARIN SODIUM 40 MG: 100 INJECTION SUBCUTANEOUS at 10:46

## 2022-12-22 NOTE — DISCHARGE INSTRUCTIONS
GOING HOME AFTER GASTRIC SLEEVE/ GASTRIC BYPASS SURGERY  [facility] Weight Loss: Post-Operative Information/Instructions  Kevin Fuentes Jr., MD  General Patient Instructions for Discharge   - Call Surgeon's office at [phone] for follow-up appointment.    - Be sure you, the patient, have a follow-up appointment to be seen within seven (7) days after discharge. If not, please call [phone] to schedule an appointment. If you are discharged on a Saturday or Sunday, please call Monday to schedule the appointment.  - Contact the Surgeon at [phone] for any questions or concerns, including temperature greater than or equal to 101F, shortness of breath, leg swelling, redness at incision sites, nausea, vomiting, chills, or problems or questions.    - Follow the Gastric Stage 1 Diet    à Clear liquids, room temperature, sugar-free, caffeine-free, non-carbonated, 70 grams of protein, No Straws.  - You may shower. No tub bath for 2 weeks.  - No lifting, pushing, pulling, or tugging >25 pounds for 3 weeks.  - Ambulate every 3 hours while awake minimum for seven (7) days, increase distance daily.  - For the next several weeks, you are at an increased risk for blood clot formation. Therefore, you should walk regularly. You should not sit for prolonged periods of time, more than 45 minutes, without getting up and walking for 5-10 minutes. This includes any car rides, including the drive home from the hospital. If driving any distance greater than 30 miles over the next two (2) weeks, stop every 30-45 minutes and walk for 5-10 minutes each time.  - Continue using Incentive Spirometer and coughing exercises at least every two (2) hours while awake for one week.  - Continue use of CPAP/BIPAP for diagnosis of sleep apnea as directed.  - No driving or operating machinery allowed while taking narcotic (prescription) pain medication, and until you feel comfortable forcefully applying the brakes if needed. (This  usually takes more than 3 days.)    - Make an appointment with your Primary Care Physician within one week post-op to look at your home medications for possible changes or discontinuity.   Medications  - The nurse will provide a list of medications for you to continue at home   - If you received a Lovenox (Enoxaparin) or Apixiban (Eliquis) prescription at pre-op visit with Surgeon, start taking the medicine the morning after discharge unless directed otherwise.    - If you were prescribed Lovenox (Enoxaparin), review the education/teaching material/video with the nurse.    - Take post op pain meds as prescribed as needed.   - Continue Foltx until finished.   - Resume use of Actigall (Ursodiol) one (1) week after surgery if patient still has gallbladder. You should have been given a prescription at your pre-op visit. Contact the office if you do not have the prescription.   - Resume bariatric vitamin regimen as instructed in pre-op education with bariatric coordinator.    - Zegerid or Prilosec OTC (or generic) by mouth once daily for four (4) weeks unless you are already taking a proton pump inhibitor as home medication. Follow dosing instructions on package.   Nausea/Vomiting:  The following are possible causes for nausea/vomiting:  - Drinking too much or too fast.  - Sinus drainage/post nasal drip for allergy sufferers (you may take Sudafed, Claritin, Tylenol Sinus/Allergy, or other decongestants and nose sprays to help with this discomfort).  - Low blood sugar (sweating, shaky, irritable, weakness, dizzy or tunnel-vision) - treatment is to sip 100% fruit juice - no sugar added until symptoms subside.  - Acid in fruit juice - (may dilute with water or avoid).  - Eating or drinking something that is not on clear liquid (stage 1) diet.  Any nausea/vomiting that prohibits you from keeping fluids down for greater than 24 hours requires a call to the surgeon's office.  Urine:  Use your urine color as a guide to  determine if you are drinking enough fluid. The darker the urine, the more fluids you need to drink. Urine should be clear to light yellow if you are getting enough fluid. If you should experience frequency, burning or pain with urination, blood in urine, contact us or your primary care physician for possible UTI (urinary tract infection), which could require antibiotics (liquid preferred).  Bowel Movements:  You may not have a bowel movement for 2-5 days after going home. You may then experience liquid, runny or loose stools for approximately 3-4 weeks following surgery. This would require you to drink even more fluids to prevent dehydration. Some patients may experience constipation, which can be treated with increased fluids, drinking warm liquids, increased activity and the use of a Fleets Enema, Milk of Magnesia, or suppositories. The first couple of bowel movements could be bloody, tarry black or dark maroon in color. This is OK as long as the stool returns to a normal color in 1-2 days. If however, you have frequent or a large amount of bloody or tarry black stools and/or become light-headed or dizzy, you may be bleeding and require urgent attention. Please call us right away.  Abdominal Incisions:  You will have small incisions. Do not scrub incisions, but allow the warm, soapy water to run over the incisions, rinse well, and pat dry. You may use any brand of anti-bacterial soap. Do not use Peroxide or Neosporin type ointments on sites, unless instructed to do so by a surgeon or nurse. Monitor daily for signs/symptoms of infection, which might include: drainage with a foul odor, pain, redness, swelling or heat at the incision sight; fever, body aches and chills. If you suspect infection or have a fever, give us a call.  Pain:  You will be given a prescription for pain medication to control your pain. If you feel the dose is too strong, you may take half the ordered dose, or you may take Tylenol adult liquid  per package instructions for minor pain. Do not take any medications that contain aspirin or aspirin products.  Do not take medications like: Motrin, Aleve, Ibuprofen, Advil, Naproxen, Celebrex, Daypro, Bextra, Meloxicam or other medications commonly used for arthritis or joint pain.  No steroids or cortisone injections. There may be pain, which should improve every few days. Pain should not suddenly get worse or more intense. Pain that suddenly changes and is constant and severe should be called in to the surgeon's office. Any sudden pain in the lower extremities with associated warmth and redness should be called in to the surgeon's office immediately. Do not rub or massage this area, as it could be a blood clot.  Diet:  Remain on the clear liquid diet (stage 1) per your  which includes 70 grams of protein each day, sugar free, non carbonated and no straws. Day 1 is the day of surgery. If you are tolerating the stage 1 diet, you may then proceed to stage 2 diet, as instructed in the . Do not progress to the stage 2 diet if you are having nausea/vomiting. Refer to the Basic Nutrition and Food Principles guide.  Medications:  The nurse will let you know which medications you will need to continue once you go home. Do not take any medications that are extended or time released if you had the gastric bypass procedure, OK to take if you had the gastric sleeve procedure. Large capsules can be opened and diluted with clear liquids. Check with your physician or pharmacist as to which pills may be crushed and which capsules may be opened and diluted safely. Continue taking Foltx as surgeon orders. If you still have your gall bladder and were prescribed Actigall (Ursodiol), you may resume this medication one week after your surgery. You will remain on Actigall (Ursodiol) for approximately 6 months. The dose is 1 pill, 2 times each day for 6 months.  Activity:  Continue your deep breathing and  coughing exercises with your Incentive Spirometer breathing device at least every 3 hours while awake (10 repetitions each time) for one week. May use CPAP. This will help to prevent respiratory problems such as pneumonia. No lifting, pulling or tugging anything over 25 pounds for 3 weeks after surgery. You may shower but no tub baths, hot tubs or swimming for 2 weeks. Moderate walking is recommended every 3 hours while awake minimum, increase distance daily. Further exercise will be discussed at the first post-op visit. No driving or operating machinery allow until off narcotic pain medication and until you feel comfortable forcefully applying the brakes (usually takes 3 or more days). For the next few weeks you are at an increased risk for blood clot formation. Therefore you should walk regularly and you should not sit for prolonged periods of time, more than 45 minutes without getting up and walking for 5-10 minutes. This includes car rides. Including riding home from the hospital. If riding a distance greater than 30 miles over the next 2 weeks stop every 30-45 minutes and walk 5-10 mintues each time. No tanning bed use for 8 weeks after surgery and in general, not recommended due to the increased risk for skin cancer. Incisions will burn/blister very badly with tanning bed use.  Illness:  Your primary care physician should treat general illness such as ear infections, sinus infections, and viral type illnesses, etc. Medications prescribed should be liquid/elixir form when possible, for the first 30 days.  General:  In general, it is recommended that you weigh yourself no more than once per week. Let the weight come off you and concentrate on more important things. Remember the weight was not gained overnight, nor will it be lost overnight. Gastric Bypass/ Gastric Sleeve weight loss will continue over a period of 12-18 months. Do not  yourself according to how others are doing after surgery, as this will  cause unnecessary discouragement.  THE ABOVE ARE GENERAL GUIDELINES TO ASSIST YOU ONCE HOME, IF YOU ARE IN DOUBT, OR YOU HAVE ANY QUESTIONS, CALL US AT THE NUMBERS LISTED BELOW.  IN THE EVENT OF SUDDEN CHEST PAIN, SHORTNESS OF BREATH, OR ANY LIFE THREATENING CONDITION, CALL 911.  Any time you are evaluated or admitted to another facility, please have someone notify the surgeon's office.  Supplements:  70 grams of protein taken EVERY DAY. Remember to drink at least 64 ounces of fluid a day, sipping slowly early on. Increase this amount during the summertime. Sipping slowly will not stretch your new stomach. Drinking too fast or gulping liquids will cause brief discomfort and early could cause staple line disruption (leak). With eating, tiny bites, then chew, chew, chew, and swallow. Lay your fork/spoon down for 2-3 minutes, and then take your next bite. Your pouch will tell you within 1-2 bites if it is going to tolerate what you are eating.   Protein Vendors:  Refer to protein vendors' handout from consult class. You can always find protein drinks at the bariatric office, grocery stores, [employer], drug [employer], [employer], health food stores, and on the Internet. Find one high in protein (15-30 grams per serving) and low carb (less than 18 grams per serving).  Now is a great time to re-read your . Please review specific instructions given to you at discharge by your physician (surgeon).  HOW/WHEN TO CONTACT US:  It is imperative that you contact us with any of the following:    Ÿ fever greater than 101 degrees  Ÿ shortness of breath  Ÿ leg swelling  Ÿ body aches  Ÿ shaking chills  Ÿ nausea and vomitting  Ÿ pain that has worsened  Ÿ redness at incision sites  Ÿ pus or foul smelling drainage from an incision or wound  Ÿ inability to keep fluids down for more than a day  Ÿ any other condition you feel needs our attention.  [facility] - Bariatric: [phone] call this number anytime 24 hours a  day / 7 days a week.  Teach-back Questions to be answered by the patient prior to discharge.   What complications would prompt you to call your doctor when you return home? _________________    What is the purpose of your prescribed medication? ________________  What are some potential side effects of the medications you will be taking at home? _______________

## 2022-12-22 NOTE — NURSING NOTE
VSS, voiding without issue, ambulating in halls, up ad lesley, tolerating clear liquids and PO meds, pain controlled w/ scheduled meds, no nausea reported this shift. Lap sites x5 CDI and approximated w/ dermabond. Pt educated on post op care, medication changes and new meds, when to seek help, and to remove scopalamine patch tomorrow, and she is aware of her follow up appt. Encouraged patient to monitor her BP at home and she will call her PCP to inquire about BP meds. This RN educated patient on Lovenox use using teach back method. Meds via meds to beds, abdominal binder in place. Pt  is taking her home and she is eager to go.

## 2022-12-22 NOTE — PLAN OF CARE
Goal Outcome Evaluation:  Plan of Care Reviewed With: patient        Progress: improving  Outcome Evaluation: VSS. O2 at 2L/min on flow. Up with assist, ambulated in the amador Q3H, voided freely. Pericare done.  5 lap sites with dermobond CDI. Abdominal binder applied.  Tab Dilaudid given one for moderate pain. Nausea well controlled.  IV Fluids on flow.  Sleepy, Gabapentin omitted .

## 2022-12-22 NOTE — PROGRESS NOTES
Case Management Discharge Note      Final Note: Discharged home. Cherie Mondragon, MARIELOS              Transportation Services  Private: Car    Final Discharge Disposition Code: 01 - home or self-care

## 2022-12-22 NOTE — DISCHARGE SUMMARY
Discharge Summary    Patient name: Yancy Hernandez    Medical record number: [id number]    Admission date: 12/21/2022  Discharge date:      Attending physician: Dr. Kevin Fuentes    Primary care physician: Emmy Díaz MD    Referring physician: Kevin Fuentes Jr., MD  [address] [address]    Condition on discharge: Stable    Primary Diagnoses:  Morbid obesity with co-morbidities    Operative Procedure:  Robotic assisted laparoscopic sleeve gastrectomy (titan) with PEH repair     Yancy Hernandez  is post op day one status post procedure listed. Patient denies shortness of air and lower extremity pain. Feels better than yesterday. No vomiting this am. Ambulating well and using incentive spirometer.          /88 (BP Location: Right arm, Patient Position: Lying)   Pulse 79   Temp 97.8 °F (36.6 °C) (Oral)   Resp 18   Wt (!) 152 kg (335 lb 8.6 oz)   LMP  (LMP Unknown) Comment: last period 7 years ago, spots at times  SpO2 92%   BMI 61.37 kg/m²     General:  alert, appears stated age and cooperative   Abdomen: soft, bowel sounds active, appropriate tenderness   Incision:   healing well, no drainage, no erythema, no hernia, no seroma, no swelling, no dehiscence, incision well approximated   Heart: Regular rate   Lungs: Clear to auscultation bilaterally     I reviewed the patient's new clinical results.  Discussed with Dr. Fuentes and the patient. As her cr was elevated she will hold her lotensin today and tomorrow and call her PCP to discuss RX for benazepril without water pill.     Lab Results (last 24 hours)     Procedure Component Value Units Date/Time    Comprehensive Metabolic Panel [[id number]]  (Abnormal) Collected: 12/22/22 0605    Specimen: Blood Updated: 12/22/22 0649     Glucose 138 mg/dL      BUN 16 mg/dL      Creatinine 1.07 mg/dL      Sodium 138 mmol/L      Potassium 3.6 mmol/L      Chloride 100 mmol/L      CO2 32.5 mmol/L      Calcium 9.1 mg/dL      Total  Protein 7.7 g/dL      Albumin 3.90 g/dL      ALT (SGPT) 58 U/L      AST (SGOT) 40 U/L      Alkaline Phosphatase 64 U/L      Total Bilirubin 0.4 mg/dL      Globulin 3.8 gm/dL      A/G Ratio 1.0 g/dL      BUN/Creatinine Ratio 15.0     Anion Gap 5.5 mmol/L      eGFR 66.6 mL/min/1.73      Comment: National Kidney Foundation and American Society of Nephrology (ASN) Task Force recommended calculation based on the Chronic Kidney Disease Epidemiology Collaboration (CKD-EPI) equation refit without adjustment for race.       Narrative:      GFR Normal >60  Chronic Kidney Disease <60  Kidney Failure <15      Phosphorus [[id number]]  (Normal) Collected: 12/22/22 0605    Specimen: Blood Updated: 12/22/22 0649     Phosphorus 3.5 mg/dL     Magnesium [[id number]]  (Normal) Collected: 12/22/22 0605    Specimen: Blood Updated: 12/22/22 0649     Magnesium 2.4 mg/dL     CBC & Differential [[id number]]  (Abnormal) Collected: 12/22/22 0605    Specimen: Blood Updated: 12/22/22 0633    Narrative:      The following orders were created for panel order CBC & Differential.  Procedure                               Abnormality         Status                     ---------                               -----------         ------                     CBC Auto Differential[[id number]]        Abnormal            Final result                 Please view results for these tests on the individual orders.    CBC Auto Differential [[id number]]  (Abnormal) Collected: 12/22/22 0605    Specimen: Blood Updated: 12/22/22 0633     WBC 14.92 10*3/mm3      RBC 4.21 10*6/mm3      Hemoglobin 11.6 g/dL      Hematocrit 36.8 %      MCV 87.4 fL      MCH 27.6 pg      MCHC 31.5 g/dL      RDW 12.4 %      RDW-SD 39.5 fl      MPV 10.4 fL      Platelets 380 10*3/mm3      Neutrophil % 87.3 %      Lymphocyte % 8.0 %      Monocyte % 3.6 %      Eosinophil % 0.0 %      Basophil % 0.1 %      Immature Grans % 1.0 %      Neutrophils, Absolute 13.01 10*3/mm3      Lymphocytes, Absolute  1.20 10*3/mm3      Monocytes, Absolute 0.54 10*3/mm3      Eosinophils, Absolute 0.00 10*3/mm3      Basophils, Absolute 0.02 10*3/mm3      Immature Grans, Absolute 0.15 10*3/mm3      nRBC 0.0 /100 WBC     Tissue Pathology Exam [[id number]] Collected: 12/21/22 1258    Specimen: Tissue from Stomach Updated: 12/21/22 7056             Assessment:      Doing well postoperatively.      Plan:   1. Continue Stage 1 diet  2. Continue with ambulation and Incentive spirometry  3. Plan for d/c home    Patient was seen and examined by Dr. Fuentes.    Hospital Course: The patient is a very pleasant 42 y.o. female that was admitted to the hospital with morbid obesity who underwent above procedure.  Postoperatively the patient was transferred to the bariatric unit per protocol.  Patient remained afebrile and hemodynamically stable.  Patient was up ambulating well and using incentive spirometer.  Postoperatively day 1 patient was started on stage I bariatric diet and continued with good ambulation.  Lovenox was continued.  Patient was then discharged home.      Discharge medications:      Discharge Medications      New Medications      Instructions Start Date   Enoxaparin Sodium 40 MG/0.4ML solution prefilled syringe syringe  Commonly known as: LOVENOX   40 mg, Subcutaneous, Every 24 Hours Scheduled      HYDROmorphone 2 MG tablet  Commonly known as: Dilaudid   2 mg, Oral, Every 4 Hours PRN      ondansetron ODT 4 MG disintegrating tablet  Commonly known as: ZOFRAN-ODT   4 mg, Translingual, Every 4 Hours PRN         Continue These Medications      Instructions Start Date   folic acid-vit B6-vit B12 2.5-25-1 MG tablet tablet  Commonly known as: FOLBEE   1 tablet, Oral, Daily      furosemide 40 MG tablet  Commonly known as: LASIX   40 mg, Oral, Every Other Day      multivitamin with minerals tablet tablet   1 tablet, Oral, Daily      PEPCID PO   1 tablet, Oral, Daily      potassium chloride 10 MEQ CR tablet   10 mEq, Oral, As Needed, With  lasix         Stop These Medications    benazepril-hydrochlorthiazide 20-12.5 MG per tablet  Commonly known as: LOTENSIN HCT     Chlorhexidine Gluconate Cloth 2 % pads            Discharge instructions:  Per Bariatric manual; per our protocol      Follow-up appointment: Follow up with Dr. Fuentes in the office as scheduled.  If not already scheduled call for appointment at [phone].

## 2022-12-22 NOTE — PAYOR COMM NOTE
Sanju Hernandez (42 y.o. Female)       Dc summary for [id number]          Date of Birth   1980    Social Security Number       Address   [address]  [facility] [address]    Home Phone   [phone]    MRN   [id number]       Catholic   None    Marital Status                               Admission Date   12/21/22    Admission Type   Elective    Admitting Provider   Kevin Fuentes Jr., MD    Attending Provider       Department, Room/Bed   [address], 97/1       Discharge Date   12/22/2022    Discharge Disposition   Home or Self Care    Discharge Destination                               Attending Provider: (none)   Allergies: Hydrocodone-acetaminophen, Codeine    Isolation: None   Infection: None   Code Status: Prior    Ht: 157.5 cm (62\")   Wt: 152 kg (335 lb 8.6 oz)    Admission Cmt: None   Principal Problem: Class 3 severe obesity due to excess calories with serious comorbidity and body mass index (BMI) of 60.0 to 69.9 in adult (HCC) [E66.01,Z68.44]                 Active Insurance as of 12/21/2022     Primary Coverage     Payor Plan Insurance Group Employer/Plan Group    [facility] [id number]     Payor Plan Address Payor Plan Phone Number Payor Plan Fax Number Effective Dates    [address] [address]   1/1/2021 - None Entered    [facility] [address]       Subscriber Name Subscriber Birth Date Member ID       SANJU HERNANDEZ 1980 [id number]                 Emergency Contacts      (Rel.) Home Phone Work Phone Mobile Phone    KIRBY CARDENAS (Spouse) [phone] -- [phone]    ROSE STRINGER (Friend) -- -- [phone]               Discharge Summary      Delilah Reynolds APRN at 12/22/22 0837     Attestation signed by Kevin Fuentes Jr., MD at 12/22/22 0943    I have reviewed this documentation and agree.                  Discharge Summary    Patient name: Sanju Hernandez    [facility]  record number: [id number]    Admission date: 12/21/2022  Discharge date:      Attending physician: Dr. Kevin Fuentes    Primary care physician: Emmy Díaz MD    Referring physician: Kevin Fuentes Jr., MD  [address] [address]    Condition on discharge: Stable    Primary Diagnoses:  Morbid obesity with co-morbidities    Operative Procedure:  Robotic assisted laparoscopic sleeve gastrectomy (titan) with PEH repair    Yancy Hernandez  is post op day one status post procedure listed. Patient denies shortness of air and lower extremity pain. Feels better than yesterday. No vomiting this am. Ambulating well and using incentive spirometer.        /88 (BP Location: Right arm, Patient Position: Lying)   Pulse 79   Temp 97.8 °F (36.6 °C) (Oral)   Resp 18   Wt (!) 152 kg (335 lb 8.6 oz)   LMP  (LMP Unknown) Comment: last period 7 years ago, spots at times  SpO2 92%   BMI 61.37 kg/m²     General:  alert, appears stated age and cooperative   Abdomen: soft, bowel sounds active, appropriate tenderness   Incision:   healing well, no drainage, no erythema, no hernia, no seroma, no swelling, no dehiscence, incision well approximated   Heart: Regular rate   Lungs: Clear to auscultation bilaterally     I reviewed the patient's new clinical results.  Discussed with Dr. Fuentes and the patient. As her cr was elevated she will hold her lotensin today and tomorrow and call her PCP to discuss RX for benazepril without water pill.     Lab Results (last 24 hours)     Procedure Component Value Units Date/Time    Comprehensive Metabolic Panel [[id number]]  (Abnormal) Collected: 12/22/22 0605    Specimen: Blood Updated: 12/22/22 0649     Glucose 138 mg/dL      BUN 16 mg/dL      Creatinine 1.07 mg/dL      Sodium 138 mmol/L      Potassium 3.6 mmol/L      Chloride 100 mmol/L      CO2 32.5 mmol/L      Calcium 9.1 mg/dL      Total Protein 7.7 g/dL      Albumin 3.90 g/dL      ALT (SGPT) 58 U/L       AST (SGOT) 40 U/L      Alkaline Phosphatase 64 U/L      Total Bilirubin 0.4 mg/dL      Globulin 3.8 gm/dL      A/G Ratio 1.0 g/dL      BUN/Creatinine Ratio 15.0     Anion Gap 5.5 mmol/L      eGFR 66.6 mL/min/1.73      Comment: National Kidney Foundation and American Society of Nephrology (ASN) Task Force recommended calculation based on the Chronic Kidney Disease Epidemiology Collaboration (CKD-EPI) equation refit without adjustment for race.       Narrative:      GFR Normal >60  Chronic Kidney Disease <60  Kidney Failure <15      Phosphorus [[id number]]  (Normal) Collected: 12/22/22 0605    Specimen: Blood Updated: 12/22/22 0649     Phosphorus 3.5 mg/dL     Magnesium [[id number]]  (Normal) Collected: 12/22/22 0605    Specimen: Blood Updated: 12/22/22 0649     Magnesium 2.4 mg/dL     CBC & Differential [[id number]]  (Abnormal) Collected: 12/22/22 0605    Specimen: Blood Updated: 12/22/22 0633    Narrative:      The following orders were created for panel order CBC & Differential.  Procedure                               Abnormality         Status                     ---------                               -----------         ------                     CBC Auto Differential[[id number]]        Abnormal            Final result                 Please view results for these tests on the individual orders.    CBC Auto Differential [[id number]]  (Abnormal) Collected: 12/22/22 0605    Specimen: Blood Updated: 12/22/22 0633     WBC 14.92 10*3/mm3      RBC 4.21 10*6/mm3      Hemoglobin 11.6 g/dL      Hematocrit 36.8 %      MCV 87.4 fL      MCH 27.6 pg      MCHC 31.5 g/dL      RDW 12.4 %      RDW-SD 39.5 fl      MPV 10.4 fL      Platelets 380 10*3/mm3      Neutrophil % 87.3 %      Lymphocyte % 8.0 %      Monocyte % 3.6 %      Eosinophil % 0.0 %      Basophil % 0.1 %      Immature Grans % 1.0 %      Neutrophils, Absolute 13.01 10*3/mm3      Lymphocytes, Absolute 1.20 10*3/mm3      Monocytes, Absolute 0.54 10*3/mm3      Eosinophils,  Absolute 0.00 10*3/mm3      Basophils, Absolute 0.02 10*3/mm3      Immature Grans, Absolute 0.15 10*3/mm3      nRBC 0.0 /100 WBC     Tissue Pathology Exam [[id number]] Collected: 12/21/22 1259    Specimen: Tissue from Stomach Updated: 12/21/22 1500            Assessment:     Doing well postoperatively.     Plan:   1. Continue Stage 1 diet  2. Continue with ambulation and Incentive spirometry  3. Plan for d/c home    Patient was seen and examined by Dr. Fuentes.    Hospital Course: The patient is a very pleasant 42 y.o. female that was admitted to the hospital with morbid obesity who underwent above procedure.  Postoperatively the patient was transferred to the bariatric unit per protocol.  Patient remained afebrile and hemodynamically stable.  Patient was up ambulating well and using incentive spirometer.  Postoperatively day 1 patient was started on stage I bariatric diet and continued with good ambulation.  Lovenox was continued.  Patient was then discharged home.      Discharge medications:      Discharge Medications      New Medications      Instructions Start Date   Enoxaparin Sodium 40 MG/0.4ML solution prefilled syringe syringe  Commonly known as: LOVENOX   40 mg, Subcutaneous, Every 24 Hours Scheduled      HYDROmorphone 2 MG tablet  Commonly known as: Dilaudid   2 mg, Oral, Every 4 Hours PRN      ondansetron ODT 4 MG disintegrating tablet  Commonly known as: ZOFRAN-ODT   4 mg, Translingual, Every 4 Hours PRN         Continue These Medications      Instructions Start Date   folic acid-vit B6-vit B12 2.5-25-1 MG tablet tablet  Commonly known as: FOLBEE   1 tablet, Oral, Daily      furosemide 40 MG tablet  Commonly known as: LASIX   40 mg, Oral, Every Other Day      multivitamin with minerals tablet tablet   1 tablet, Oral, Daily      PEPCID PO   1 tablet, Oral, Daily      potassium chloride 10 MEQ CR tablet   10 mEq, Oral, As Needed, With lasix         Stop These Medications    benazepril-hydrochlorthiazide  20-12.5 MG per tablet  Commonly known as: LOTENSIN HCT     Chlorhexidine Gluconate Cloth 2 % pads            Discharge instructions:  Per Bariatric manual; per our protocol      Follow-up appointment: Follow up with Dr. Fuentes in the office as scheduled.  If not already scheduled call for appointment at [phone].    Electronically signed by Kevin Fuentes Jr., MD at 12/22/22 6894

## 2022-12-27 ENCOUNTER — TELEPHONE (OUTPATIENT)
Dept: BARIATRICS/WEIGHT MGMT | Facility: CLINIC | Age: 42
End: 2022-12-27

## 2022-12-27 DIAGNOSIS — A04.8 BACTERIAL INFECTION DUE TO H. PYLORI: Primary | ICD-10-CM

## 2022-12-27 RX ORDER — OMEPRAZOLE MAGNESIUM, AMOXICILLIN AND RIFABUTIN 10; 250; 12.5 MG/1; MG/1; MG/1
4 CAPSULE, DELAYED RELEASE ORAL 3 TIMES DAILY
Qty: 168 CAPSULE | Refills: 0 | Status: SHIPPED | OUTPATIENT
Start: 2022-12-27 | End: 2023-01-10

## 2022-12-29 ENCOUNTER — OFFICE VISIT (OUTPATIENT)
Dept: BARIATRICS/WEIGHT MGMT | Facility: CLINIC | Age: 42
End: 2022-12-29

## 2022-12-29 VITALS
BODY MASS INDEX: 53.92 KG/M2 | HEART RATE: 68 BPM | WEIGHT: 293 LBS | HEIGHT: 62 IN | TEMPERATURE: 97.7 F | DIASTOLIC BLOOD PRESSURE: 92 MMHG | SYSTOLIC BLOOD PRESSURE: 145 MMHG

## 2022-12-29 DIAGNOSIS — Z71.3 DIETARY COUNSELING: ICD-10-CM

## 2022-12-29 DIAGNOSIS — Z98.84 S/P LAPAROSCOPIC SLEEVE GASTRECTOMY: ICD-10-CM

## 2022-12-29 DIAGNOSIS — E66.01 CLASS 3 SEVERE OBESITY DUE TO EXCESS CALORIES WITH SERIOUS COMORBIDITY AND BODY MASS INDEX (BMI) OF 60.0 TO 69.9 IN ADULT: Primary | ICD-10-CM

## 2022-12-29 PROBLEM — R60.0 BILATERAL LEG EDEMA: Status: ACTIVE | Noted: 2022-12-29

## 2022-12-29 PROBLEM — R32 URINARY INCONTINENCE IN FEMALE: Status: ACTIVE | Noted: 2022-12-29

## 2022-12-29 PROCEDURE — 99024 POSTOP FOLLOW-UP VISIT: CPT | Performed by: NURSE PRACTITIONER

## 2022-12-29 RX ORDER — DICLOFENAC SODIUM 75 MG/1
75 TABLET, DELAYED RELEASE ORAL 2 TIMES DAILY
COMMUNITY
Start: 2022-12-16 | End: 2023-03-19

## 2022-12-29 NOTE — PROGRESS NOTES
[facility]  [address]  [facility] [address]  [phone]  [address] [address]  [phone]  Dept: [phone]  12/29/2022      Yancy Hernandez.  [id number]  [id number]  1980  female      Chief Complaint   Patient presents with   • Post-op     1 week PO sleeve       BH Post-Op Bariatric Surgery:   Yancy Hernandez is status post laparopscopic Laparoscopic Sleeve with paraesophageal hernia repair procedure, performed on 12/21/2022.     HPI:   Today's weight is (!) 149 kg (329 lb) pounds, today's BMI is Body mass index is 60.16 kg/m².,@ has a  loss of 26 pounds since surgery. The patient reports a decreased portion size and loss of appetite.  Yancy Hernandez denies nausea, vomiting, reflux, dysphagia and reports tolerating clear liquids.  Drinking 2 protein shakes per day and getting over 64 oz fluids.  Has been tolerating pudding, yogurt, and creamy soups. The patient c/o appropriate post-op incisional discomfort that is improving. she is doing well with protein and water intake so far. Taking their vitamins, walking and using IS. Denies fevers, chills, chest pain or shortness of air.      Diet and Exercise: Diet history reviewed and discussed with the patient. Weight loss/gains to date discussed with the patient. No carbonated beverage consumption and exercising regularly- walking frequently.   Supplements: multivitamins, B-12, calcium, iron, B-1 and Vitamin D.   Patient is taking blood thinner as prescribed: Lovenox  Current outpatient and discharge medications have been reconciled for the patient.  Reviewed by: SCOTT Bower        Review of Systems   Constitutional: Positive for activity change and appetite change.   Respiratory: Negative for shortness of breath.    Cardiovascular: Negative for chest pain.   Gastrointestinal: Positive for abdominal pain.   All other systems reviewed and are  negative.      Patient Active Problem List   Diagnosis   • Vitamin D deficiency   • Nonalcoholic fatty liver disease   • Iron deficiency anemia   • Edema   • Benign essential hypertension   • Class 3 severe obesity due to excess calories with serious comorbidity and body mass index (BMI) of 60.0 to 69.9 in adult (HCC)   • Dietary counseling   • HORTENCIA (obstructive sleep apnea)   • History of DVT (deep vein thrombosis)   • GERD (gastroesophageal reflux disease)   • Multiple joint pain   • Borderline hyperlipidemia   • Hiatal hernia   • Bilateral leg edema   • Urinary incontinence in female   • S/P laparoscopic sleeve gastrectomy       The following portions of the patient's history were reviewed and updated as appropriate: allergies, current medications, past medical history, past surgical history and problem list.    Vitals:    12/29/22 1213   BP: 145/92   Pulse: 68   Temp: 97.7 °F (36.5 °C)       Physical Exam  Vitals reviewed.   Constitutional:       General: She is awake. She is not in acute distress.     Appearance: She is morbidly obese.   HENT:      Head: Normocephalic and atraumatic.      Mouth/Throat:      Mouth: Mucous membranes are moist.   Eyes:      General: No scleral icterus.     Extraocular Movements: Extraocular movements intact.      Conjunctiva/sclera: Conjunctivae normal.      Pupils: Pupils are equal, round, and reactive to light.   Cardiovascular:      Rate and Rhythm: Normal rate and regular rhythm.      Heart sounds: Normal heart sounds.   Pulmonary:      Effort: Pulmonary effort is normal. No respiratory distress.      Breath sounds: Normal breath sounds.   Abdominal:      General: Abdomen is flat. Bowel sounds are normal. There is no distension.      Palpations: Abdomen is soft.      Tenderness: There is no abdominal tenderness. There is no guarding.      Comments: Incisions clean, dry, intact; no erythema   Musculoskeletal:         General: Normal range of motion.      Cervical back: Normal  range of motion and neck supple.   Skin:     General: Skin is warm and dry.   Neurological:      General: No focal deficit present.      Mental Status: She is alert and oriented to person, place, and time.   Psychiatric:         Mood and Affect: Mood normal.         Behavior: Behavior normal. Behavior is cooperative.         Thought Content: Thought content normal.         Judgment: Judgment normal.         Assessment:   Post-op, the patient is doing well.     Encounter Diagnoses   Name Primary?   • Class 3 severe obesity due to excess calories with serious comorbidity and body mass index (BMI) of 60.0 to 69.9 in adult (HCC) Yes   • S/P laparoscopic sleeve gastrectomy    • Dietary counseling        Plan:   Reviewed with patient the importance of following the manual for diet progression. Increase activity as tolerated. Continue increasing daily intake of protein and water.   Return to work: the patient is to return to 3 weeks from their surgery date with no restrictions unless they develop medical problems in which we will see them back in the office. They received a note in our office today with their return to work date.  Activity restrictions: no lifting, pushing or pulling over 25lbs for 3 weeks.   Recommended patient be sure to get at least 70 grams of protein per day. Discussed with the patient the recommended amount of water per day to intake. Reviewed vitamin requirements. Be sure to do routine exercise and increase activity as tolerated. No asa, nsaids or steroids for 8 weeks if gastric sleeve procedure and lifelong if gastric bypass procedure.. Patient may use miralax as needed if necessary.     Instructions / Recommendations: dietary counseling recommended, recommended a daily protein intake of  grams, vitamin supplement(s) recommended, recommended exercising at least 150 minutes per week, behavior modifications recommended and instructed to call the office for concerns, questions, or problems.      The patient was instructed to follow up at one month follow up appt.     The patient was counseled regarding post op bariatric manual

## 2023-01-23 ENCOUNTER — OFFICE VISIT (OUTPATIENT)
Dept: BARIATRICS/WEIGHT MGMT | Facility: CLINIC | Age: 43
End: 2023-01-23
Payer: COMMERCIAL

## 2023-01-23 VITALS
HEART RATE: 75 BPM | BODY MASS INDEX: 53.92 KG/M2 | HEIGHT: 62 IN | WEIGHT: 293 LBS | DIASTOLIC BLOOD PRESSURE: 88 MMHG | SYSTOLIC BLOOD PRESSURE: 160 MMHG | TEMPERATURE: 97.3 F

## 2023-01-23 DIAGNOSIS — E66.01 MORBID OBESITY WITH BMI OF 50.0-59.9, ADULT: Primary | ICD-10-CM

## 2023-01-23 DIAGNOSIS — R53.82 CHRONIC FATIGUE: ICD-10-CM

## 2023-01-23 DIAGNOSIS — D50.9 IRON DEFICIENCY ANEMIA, UNSPECIFIED IRON DEFICIENCY ANEMIA TYPE: ICD-10-CM

## 2023-01-23 DIAGNOSIS — I10 BENIGN ESSENTIAL HYPERTENSION: ICD-10-CM

## 2023-01-23 DIAGNOSIS — G47.33 OSA (OBSTRUCTIVE SLEEP APNEA): ICD-10-CM

## 2023-01-23 DIAGNOSIS — R60.0 LOCALIZED EDEMA: ICD-10-CM

## 2023-01-23 DIAGNOSIS — Z86.718 HISTORY OF DVT (DEEP VEIN THROMBOSIS): ICD-10-CM

## 2023-01-23 DIAGNOSIS — Z71.3 DIETARY COUNSELING: ICD-10-CM

## 2023-01-23 DIAGNOSIS — K76.0 NONALCOHOLIC FATTY LIVER DISEASE: ICD-10-CM

## 2023-01-23 DIAGNOSIS — E55.9 VITAMIN D DEFICIENCY: ICD-10-CM

## 2023-01-23 DIAGNOSIS — Z98.84 S/P LAPAROSCOPIC SLEEVE GASTRECTOMY: ICD-10-CM

## 2023-01-23 DIAGNOSIS — M25.50 MULTIPLE JOINT PAIN: ICD-10-CM

## 2023-01-23 DIAGNOSIS — R32 URINARY INCONTINENCE IN FEMALE: ICD-10-CM

## 2023-01-23 DIAGNOSIS — E78.5 BORDERLINE HYPERLIPIDEMIA: ICD-10-CM

## 2023-01-23 PROBLEM — R33.9 URINARY RETENTION: Status: ACTIVE | Noted: 2023-01-23

## 2023-01-23 PROBLEM — F51.01 PRIMARY INSOMNIA: Status: ACTIVE | Noted: 2023-01-23

## 2023-01-23 PROCEDURE — 99024 POSTOP FOLLOW-UP VISIT: CPT | Performed by: NURSE PRACTITIONER

## 2023-01-23 RX ORDER — LISINOPRIL 10 MG/1
10 TABLET ORAL DAILY
COMMUNITY
Start: 2023-01-09 | End: 2023-03-19

## 2023-01-23 RX ORDER — POLYETHYLENE GLYCOL 3350 17 G/17G
POWDER, FOR SOLUTION ORAL
COMMUNITY
Start: 2023-01-09 | End: 2023-03-21

## 2023-01-23 RX ORDER — TRAZODONE HYDROCHLORIDE 50 MG/1
TABLET ORAL
COMMUNITY
Start: 2023-01-09 | End: 2023-03-19

## 2023-01-23 NOTE — PROGRESS NOTES
[facility] BARIATRIC [facility] BARIATRIC SURGERY  [address] [facility] [address]  [facility] [address]  [phone]  [address] [id number] [phone]  Dept: [phone]  1/23/2023      Yancy Hernandez.  [id number]  [id number]  1980  female      Chief Complaint   Patient presents with   • Post-op     1 mo po Sleeve       BH Post-Op Bariatric Surgery:   Yancy Hernandez is status post Laparoscopic Sleeve with PEHR procedure, performed on 12/21/2022     HPI:   Today's weight is (!) 148 kg (326 lb) pounds, today's BMI is Body mass index is 59.61 kg/m².,has a  loss of 3 pounds since the last visit and weight loss since surgery is 29 pounds. The patient reports a decreased portion size and loss of appetite.      Yancy Hernandez denies nausea, vomiting, reflux, dysphagia and reports tolerating regular diet.  She has been having headaches and fatigue.  Her bp meds have been doubled because her bp has not been controlled.  She hasn't been taking her Lasix since surgery.  She is worried about kidney failure.  She is drinking 32-40 oz of water per day and 2 protein shakes per day.  she doesn't like much meat but she does like chicken and shrimp.       Diet and Exercise: Diet history reviewed and discussed with the patient. Weight loss/gains to date discussed with the patient. The patient states they are eating 60+ grams of protein per day. She reports eating 3-4 meals per day, a typical portion size of 1/2 cup, eating 1 snacks per day, drinking 5+ or more 8-oz. glasses of water per day, no carbonated beverage consumption and exercising regularly.     Supplements: bmtv.     Review of Systems   Constitutional: Positive for activity change, appetite change and fatigue.   Respiratory: Negative for shortness of breath.    Cardiovascular: Negative for chest pain.   All other systems reviewed and are negative.      Patient Active Problem List   Diagnosis   • Vitamin D  deficiency   • Nonalcoholic fatty liver disease   • Iron deficiency anemia   • Edema   • Benign essential hypertension   • Morbid obesity with BMI of 50.0-59.9, adult (HCC)   • Dietary counseling   • HORTENCIA (obstructive sleep apnea)   • History of DVT (deep vein thrombosis)   • GERD (gastroesophageal reflux disease)   • Multiple joint pain   • Borderline hyperlipidemia   • Hiatal hernia   • Bilateral leg edema   • Urinary incontinence in female   • S/P laparoscopic sleeve gastrectomy   • Chronic fatigue   • Primary insomnia   • Urinary retention       Past Medical History:   Diagnosis Date   • Arthritis    • Carpal tunnel syndrome    • Depression     HX   • GERD (gastroesophageal reflux disease)    • Helicobacter pylori infection 11/27/2015   • Hiatal hernia    • History of anemia    • History of blood clots     LT LEG, COUPLE YEARS AGO, TOOK ELIQUIS   • History of COVID-19 2020   • Hyperlipidemia    • Hypertension    • Knee pain, bilateral    • Migraine    • OAB (overactive bladder)    • Obesity    • Sleep apnea     CPAP       The following portions of the patient's history were reviewed and updated as appropriate: allergies, current medications, past medical history, past surgical history and problem list.    Vitals:    01/23/23 1515   BP: 160/88   Pulse: 75   Temp: 97.3 °F (36.3 °C)       Physical Exam  Vitals reviewed.   Constitutional:       General: She is not in acute distress.     Appearance: Normal appearance. She is morbidly obese.   HENT:      Head: Normocephalic and atraumatic.      Mouth/Throat:      Mouth: Mucous membranes are moist.      Pharynx: Oropharynx is clear.   Eyes:      General: No scleral icterus.     Extraocular Movements: Extraocular movements intact.      Conjunctiva/sclera: Conjunctivae normal.      Pupils: Pupils are equal, round, and reactive to light.   Cardiovascular:      Rate and Rhythm: Normal rate and regular rhythm.      Heart sounds: Normal heart sounds. No murmur heard.    No  gallop.   Pulmonary:      Effort: Pulmonary effort is normal. No respiratory distress.   Abdominal:      General: Bowel sounds are normal.      Palpations: Abdomen is soft.   Musculoskeletal:         General: Normal range of motion.      Cervical back: Normal range of motion and neck supple.      Right lower leg: Edema present.      Left lower leg: Edema present.   Skin:     General: Skin is warm and dry.   Neurological:      General: No focal deficit present.      Mental Status: She is alert and oriented to person, place, and time.   Psychiatric:         Mood and Affect: Mood normal.         Behavior: Behavior normal.         Thought Content: Thought content normal.         Judgment: Judgment normal.         Assessment:   Post-op, the patient is doing well.     Encounter Diagnoses   Name Primary?   • Morbid obesity with BMI of 50.0-59.9, adult (HCC) Yes   • S/P laparoscopic sleeve gastrectomy    • Benign essential hypertension    • Borderline hyperlipidemia    • History of DVT (deep vein thrombosis)    • Vitamin D deficiency    • Nonalcoholic fatty liver disease    • Iron deficiency anemia, unspecified iron deficiency anemia type    • Multiple joint pain    • HORTENCIA (obstructive sleep apnea)    • Localized edema    • Chronic fatigue    • Dietary counseling    • Urinary incontinence in female        Plan:   She has some edema in her extremities.  Did encourage her to take her lasix.  Will check 1 month labs a well.    If kidney function is ok, would recommend she stay on lasix and discuss with her pcp.    Increase solid protein as tolerated.   Her weight loss goal is 174#.  12 month goal 228#  Encouraged patient to be sure to get plenty of lean protein per day through small frequent meals all with a protein source.   Activity restrictions: none.   Recommended patient be sure to get at least 70 grams of protein per day by eating small, frequent meals all with high lean protein choices. Be sure to limit/cut back on daily  carbohydrate intake. Discussed with the patient the recommended amount of water per day to intake- half of body weight in ounces. Reviewed vitamin requirements. Be sure to do routine exercise, 150 minutes per week minimum, including both cardio and strength training.     Instructions / Recommendations: dietary counseling recommended, recommended a daily protein intake of  grams, vitamin supplement(s) recommended, recommended exercising at least 150 minutes per week, behavior modifications recommended and instructed to call the office for concerns, questions, or problems.     The patient was instructed to follow up in 2 months .     Total time spent during this encounter today was 30 minutes

## 2023-01-29 ENCOUNTER — HOSPITAL ENCOUNTER (EMERGENCY)
Facility: HOSPITAL | Age: 43
Discharge: HOME | End: 2023-01-29
Attending: EMERGENCY MEDICINE | Admitting: EMERGENCY MEDICINE
Payer: COMMERCIAL

## 2023-01-29 ENCOUNTER — APPOINTMENT (OUTPATIENT)
Dept: GENERAL RADIOLOGY | Facility: HOSPITAL | Age: 43
End: 2023-01-29
Payer: COMMERCIAL

## 2023-01-29 VITALS
HEIGHT: 62 IN | BODY MASS INDEX: 53.92 KG/M2 | OXYGEN SATURATION: 93 % | HEART RATE: 50 BPM | DIASTOLIC BLOOD PRESSURE: 99 MMHG | WEIGHT: 293 LBS | SYSTOLIC BLOOD PRESSURE: 137 MMHG | RESPIRATION RATE: 20 BRPM | TEMPERATURE: 98.5 F

## 2023-01-29 DIAGNOSIS — Z90.3 S/P GASTRIC SLEEVE PROCEDURE: ICD-10-CM

## 2023-01-29 DIAGNOSIS — J20.9 ACUTE BRONCHITIS, UNSPECIFIED ORGANISM: Primary | ICD-10-CM

## 2023-01-29 LAB
ALBUMIN SERPL-MCNC: 4.1 G/DL (ref 3.5–5.2)
ALBUMIN/GLOB SERPL: 1.2 G/DL
ALP SERPL-CCNC: 53 U/L (ref 39–117)
ALT SERPL W P-5'-P-CCNC: 26 U/L (ref 1–33)
ANION GAP SERPL CALCULATED.3IONS-SCNC: 10.2 MMOL/L (ref 5–15)
AST SERPL-CCNC: 20 U/L (ref 1–32)
BASOPHILS # BLD AUTO: 0.04 10*3/MM3 (ref 0–0.2)
BASOPHILS NFR BLD AUTO: 0.8 % (ref 0–1.5)
BILIRUB SERPL-MCNC: 0.4 MG/DL (ref 0–1.2)
BUN SERPL-MCNC: 10 MG/DL (ref 6–20)
BUN/CREAT SERPL: 11 (ref 7–25)
CALCIUM SPEC-SCNC: 9.4 MG/DL (ref 8.6–10.5)
CHLORIDE SERPL-SCNC: 103 MMOL/L (ref 98–107)
CO2 SERPL-SCNC: 27.8 MMOL/L (ref 22–29)
CREAT SERPL-MCNC: 0.91 MG/DL (ref 0.57–1)
DEPRECATED RDW RBC AUTO: 41.6 FL (ref 37–54)
EGFRCR SERPLBLD CKD-EPI 2021: 80.4 ML/MIN/1.73
EOSINOPHIL # BLD AUTO: 0.31 10*3/MM3 (ref 0–0.4)
EOSINOPHIL NFR BLD AUTO: 6.3 % (ref 0.3–6.2)
ERYTHROCYTE [DISTWIDTH] IN BLOOD BY AUTOMATED COUNT: 12.7 % (ref 12.3–15.4)
GLOBULIN UR ELPH-MCNC: 3.3 GM/DL
GLUCOSE SERPL-MCNC: 113 MG/DL (ref 65–99)
HCT VFR BLD AUTO: 36 % (ref 34–46.6)
HGB BLD-MCNC: 11.4 G/DL (ref 12–15.9)
HOLD SPECIMEN: NORMAL
HOLD SPECIMEN: NORMAL
IMM GRANULOCYTES # BLD AUTO: 0.02 10*3/MM3 (ref 0–0.05)
IMM GRANULOCYTES NFR BLD AUTO: 0.4 % (ref 0–0.5)
LYMPHOCYTES # BLD AUTO: 2.12 10*3/MM3 (ref 0.7–3.1)
LYMPHOCYTES NFR BLD AUTO: 43.3 % (ref 19.6–45.3)
MCH RBC QN AUTO: 28.1 PG (ref 26.6–33)
MCHC RBC AUTO-ENTMCNC: 31.7 G/DL (ref 31.5–35.7)
MCV RBC AUTO: 88.7 FL (ref 79–97)
MONOCYTES # BLD AUTO: 0.55 10*3/MM3 (ref 0.1–0.9)
MONOCYTES NFR BLD AUTO: 11.2 % (ref 5–12)
NEUTROPHILS NFR BLD AUTO: 1.86 10*3/MM3 (ref 1.7–7)
NEUTROPHILS NFR BLD AUTO: 38 % (ref 42.7–76)
NRBC BLD AUTO-RTO: 0 /100 WBC (ref 0–0.2)
NT-PROBNP SERPL-MCNC: 119.5 PG/ML (ref 0–450)
PLATELET # BLD AUTO: 269 10*3/MM3 (ref 140–450)
PMV BLD AUTO: 10.8 FL (ref 6–12)
POTASSIUM SERPL-SCNC: 3.5 MMOL/L (ref 3.5–5.2)
PROT SERPL-MCNC: 7.4 G/DL (ref 6–8.5)
RBC # BLD AUTO: 4.06 10*6/MM3 (ref 3.77–5.28)
SODIUM SERPL-SCNC: 141 MMOL/L (ref 136–145)
TROPONIN T SERPL-MCNC: <0.01 NG/ML (ref 0–0.03)
WBC NRBC COR # BLD: 4.9 10*3/MM3 (ref 3.4–10.8)
WHOLE BLOOD HOLD COAG: NORMAL
WHOLE BLOOD HOLD SPECIMEN: NORMAL

## 2023-01-29 PROCEDURE — 93005 ELECTROCARDIOGRAM TRACING: CPT

## 2023-01-29 PROCEDURE — 85025 COMPLETE CBC W/AUTO DIFF WBC: CPT

## 2023-01-29 PROCEDURE — 84484 ASSAY OF TROPONIN QUANT: CPT

## 2023-01-29 PROCEDURE — 36415 COLL VENOUS BLD VENIPUNCTURE: CPT

## 2023-01-29 PROCEDURE — 93005 ELECTROCARDIOGRAM TRACING: CPT | Performed by: EMERGENCY MEDICINE

## 2023-01-29 PROCEDURE — 83880 ASSAY OF NATRIURETIC PEPTIDE: CPT

## 2023-01-29 PROCEDURE — 99284 EMERGENCY DEPT VISIT MOD MDM: CPT

## 2023-01-29 PROCEDURE — 93010 ELECTROCARDIOGRAM REPORT: CPT | Performed by: INTERNAL MEDICINE

## 2023-01-29 PROCEDURE — 80053 COMPREHEN METABOLIC PANEL: CPT

## 2023-01-29 PROCEDURE — 71045 X-RAY EXAM CHEST 1 VIEW: CPT

## 2023-01-29 RX ORDER — PREDNISONE 20 MG/1
40 TABLET ORAL DAILY
Qty: 10 TABLET | Refills: 0 | OUTPATIENT
Start: 2023-01-29 | End: 2023-03-19

## 2023-01-29 RX ORDER — BROMPHENIRAMINE MALEATE, PSEUDOEPHEDRINE HYDROCHLORIDE, AND DEXTROMETHORPHAN HYDROBROMIDE 2; 30; 10 MG/5ML; MG/5ML; MG/5ML
10 SYRUP ORAL 4 TIMES DAILY PRN
Qty: 160 ML | Refills: 0 | OUTPATIENT
Start: 2023-01-29 | End: 2023-03-19

## 2023-01-29 RX ORDER — SODIUM CHLORIDE 0.9 % (FLUSH) 0.9 %
10 SYRINGE (ML) INJECTION AS NEEDED
Status: DISCONTINUED | OUTPATIENT
Start: 2023-01-29 | End: 2023-01-29 | Stop reason: HOSPADM

## 2023-01-29 RX ORDER — AZITHROMYCIN 250 MG/1
TABLET, FILM COATED ORAL
Qty: 6 TABLET | Refills: 0 | OUTPATIENT
Start: 2023-01-29 | End: 2023-03-19

## 2023-02-02 ENCOUNTER — OFFICE VISIT (OUTPATIENT)
Dept: BARIATRICS/WEIGHT MGMT | Facility: CLINIC | Age: 43
End: 2023-02-02
Payer: COMMERCIAL

## 2023-02-02 LAB — QT INTERVAL: 387 MS

## 2023-02-07 NOTE — PROGRESS NOTES
"Yancy Hernandez.  [id number]  [id number]  1980  female    Metabolic and Bariatric Surgery Nutrition Follow-Up    Assessment Date: 02/02/2023    Reason for Visit: struggling to meet nutrition goals     Procedure Performed:  Sleeve  Date of Surgery: 12/21/2022    Height: 62\"   Weight from last encounter: 324 lbs (01/29/2023)  BMI: 59.31 kg/m²    Surgery weight: 355 lbs   Weight change since surgery: - 31 lbs       Yancy Hernandez is 6 weeks status post sleeve procedure. Spoke with patient by telephone for nutrition follow up. Patient reports persistent cough and difficulty eating due to cough. She was seen in the ER with diagnosis of bronchitis. She says she is having difficulty meeting protein and fluid goals because of feeling poorly. They are not experiencing nausea, vomiting, reflux, diarrhea or constipation.     Recommendation: Reviewed nutrition and diet progression guidelines. Recommended to stick with foods that are well tolerated for now such as protein shakes, fluids, soft foods and we can focus on diet progression when she is feeling better. Encouraged eating slowly, taking small bites and chewing well. Focus on high protein foods or add protein powder. Sip fluids throughout the day. Call our office if symptoms do not improve or get worse. Offered follow up for support and accountability. RD contact information provided.      Follow-Up: schedule appointment when feeling better to discuss diet progression    Electronically signed by:  Lisandra Hagen RD   09:51 EST02/02/2023     "

## 2023-03-19 ENCOUNTER — HOSPITAL ENCOUNTER (EMERGENCY)
Facility: HOSPITAL | Age: 43
Discharge: HOME | End: 2023-03-19
Attending: EMERGENCY MEDICINE | Admitting: STUDENT IN AN ORGANIZED HEALTH CARE EDUCATION/TRAINING PROGRAM
Payer: COMMERCIAL

## 2023-03-19 ENCOUNTER — APPOINTMENT (OUTPATIENT)
Dept: CT IMAGING | Facility: HOSPITAL | Age: 43
End: 2023-03-19
Payer: COMMERCIAL

## 2023-03-19 VITALS
BODY MASS INDEX: 53.92 KG/M2 | HEART RATE: 54 BPM | HEIGHT: 62 IN | DIASTOLIC BLOOD PRESSURE: 118 MMHG | OXYGEN SATURATION: 97 % | SYSTOLIC BLOOD PRESSURE: 185 MMHG | TEMPERATURE: 98.1 F | WEIGHT: 293 LBS | RESPIRATION RATE: 16 BRPM

## 2023-03-19 DIAGNOSIS — H53.8 BLURRED VISION: ICD-10-CM

## 2023-03-19 DIAGNOSIS — R51.9 NONINTRACTABLE HEADACHE, UNSPECIFIED CHRONICITY PATTERN, UNSPECIFIED HEADACHE TYPE: ICD-10-CM

## 2023-03-19 DIAGNOSIS — I10 HYPERTENSION, UNSPECIFIED TYPE: Primary | ICD-10-CM

## 2023-03-19 LAB
ALBUMIN SERPL-MCNC: 4 G/DL (ref 3.5–5.2)
ALBUMIN/GLOB SERPL: 1.5 G/DL
ALP SERPL-CCNC: 53 U/L (ref 39–117)
ALT SERPL W P-5'-P-CCNC: 16 U/L (ref 1–33)
ANION GAP SERPL CALCULATED.3IONS-SCNC: 8.3 MMOL/L (ref 5–15)
AST SERPL-CCNC: 16 U/L (ref 1–32)
BASOPHILS # BLD AUTO: 0.06 10*3/MM3 (ref 0–0.2)
BASOPHILS NFR BLD AUTO: 0.9 % (ref 0–1.5)
BILIRUB SERPL-MCNC: 0.3 MG/DL (ref 0–1.2)
BUN SERPL-MCNC: 10 MG/DL (ref 6–20)
BUN/CREAT SERPL: 12.2 (ref 7–25)
CALCIUM SPEC-SCNC: 9.1 MG/DL (ref 8.6–10.5)
CHLORIDE SERPL-SCNC: 106 MMOL/L (ref 98–107)
CO2 SERPL-SCNC: 26.7 MMOL/L (ref 22–29)
CREAT SERPL-MCNC: 0.82 MG/DL (ref 0.57–1)
DEPRECATED RDW RBC AUTO: 39.8 FL (ref 37–54)
EGFRCR SERPLBLD CKD-EPI 2021: 91.2 ML/MIN/1.73
EOSINOPHIL # BLD AUTO: 0.33 10*3/MM3 (ref 0–0.4)
EOSINOPHIL NFR BLD AUTO: 5.1 % (ref 0.3–6.2)
ERYTHROCYTE [DISTWIDTH] IN BLOOD BY AUTOMATED COUNT: 12.5 % (ref 12.3–15.4)
GLOBULIN UR ELPH-MCNC: 2.7 GM/DL
GLUCOSE SERPL-MCNC: 91 MG/DL (ref 65–99)
HCT VFR BLD AUTO: 36 % (ref 34–46.6)
HGB BLD-MCNC: 11.3 G/DL (ref 12–15.9)
IMM GRANULOCYTES # BLD AUTO: 0.01 10*3/MM3 (ref 0–0.05)
IMM GRANULOCYTES NFR BLD AUTO: 0.2 % (ref 0–0.5)
LYMPHOCYTES # BLD AUTO: 2.64 10*3/MM3 (ref 0.7–3.1)
LYMPHOCYTES NFR BLD AUTO: 41.2 % (ref 19.6–45.3)
MCH RBC QN AUTO: 27.3 PG (ref 26.6–33)
MCHC RBC AUTO-ENTMCNC: 31.4 G/DL (ref 31.5–35.7)
MCV RBC AUTO: 87 FL (ref 79–97)
MONOCYTES # BLD AUTO: 0.57 10*3/MM3 (ref 0.1–0.9)
MONOCYTES NFR BLD AUTO: 8.9 % (ref 5–12)
NEUTROPHILS NFR BLD AUTO: 2.8 10*3/MM3 (ref 1.7–7)
NEUTROPHILS NFR BLD AUTO: 43.7 % (ref 42.7–76)
NRBC BLD AUTO-RTO: 0 /100 WBC (ref 0–0.2)
PLATELET # BLD AUTO: 313 10*3/MM3 (ref 140–450)
PMV BLD AUTO: 10.7 FL (ref 6–12)
POTASSIUM SERPL-SCNC: 3.9 MMOL/L (ref 3.5–5.2)
PROT SERPL-MCNC: 6.7 G/DL (ref 6–8.5)
RBC # BLD AUTO: 4.14 10*6/MM3 (ref 3.77–5.28)
SODIUM SERPL-SCNC: 141 MMOL/L (ref 136–145)
WBC NRBC COR # BLD: 6.41 10*3/MM3 (ref 3.4–10.8)

## 2023-03-19 PROCEDURE — 96374 THER/PROPH/DIAG INJ IV PUSH: CPT

## 2023-03-19 PROCEDURE — 99283 EMERGENCY DEPT VISIT LOW MDM: CPT

## 2023-03-19 PROCEDURE — 80053 COMPREHEN METABOLIC PANEL: CPT | Performed by: NURSE PRACTITIONER

## 2023-03-19 PROCEDURE — 36415 COLL VENOUS BLD VENIPUNCTURE: CPT

## 2023-03-19 PROCEDURE — 85025 COMPLETE CBC W/AUTO DIFF WBC: CPT | Performed by: NURSE PRACTITIONER

## 2023-03-19 PROCEDURE — 96375 TX/PRO/DX INJ NEW DRUG ADDON: CPT

## 2023-03-19 PROCEDURE — 25010000002 DIPHENHYDRAMINE PER 50 MG: Performed by: NURSE PRACTITIONER

## 2023-03-19 PROCEDURE — 70450 CT HEAD/BRAIN W/O DYE: CPT

## 2023-03-19 PROCEDURE — 25010000002 METOCLOPRAMIDE PER 10 MG: Performed by: NURSE PRACTITIONER

## 2023-03-19 RX ORDER — LABETALOL HYDROCHLORIDE 5 MG/ML
10 INJECTION, SOLUTION INTRAVENOUS ONCE
Status: DISCONTINUED | OUTPATIENT
Start: 2023-03-19 | End: 2023-03-19

## 2023-03-19 RX ORDER — SODIUM CHLORIDE 0.9 % (FLUSH) 0.9 %
10 SYRINGE (ML) INJECTION AS NEEDED
Status: DISCONTINUED | OUTPATIENT
Start: 2023-03-19 | End: 2023-03-19 | Stop reason: HOSPADM

## 2023-03-19 RX ORDER — DIPHENHYDRAMINE HYDROCHLORIDE 50 MG/ML
25 INJECTION INTRAMUSCULAR; INTRAVENOUS ONCE
Status: COMPLETED | OUTPATIENT
Start: 2023-03-19 | End: 2023-03-19

## 2023-03-19 RX ORDER — LISINOPRIL 10 MG/1
30 TABLET ORAL DAILY
Qty: 90 TABLET | Refills: 0 | Status: SHIPPED | OUTPATIENT
Start: 2023-03-19 | End: 2023-04-18

## 2023-03-19 RX ORDER — LABETALOL HYDROCHLORIDE 5 MG/ML
10 INJECTION, SOLUTION INTRAVENOUS ONCE
Status: COMPLETED | OUTPATIENT
Start: 2023-03-19 | End: 2023-03-19

## 2023-03-19 RX ORDER — METOCLOPRAMIDE HYDROCHLORIDE 5 MG/ML
10 INJECTION INTRAMUSCULAR; INTRAVENOUS ONCE
Status: COMPLETED | OUTPATIENT
Start: 2023-03-19 | End: 2023-03-19

## 2023-03-19 RX ADMIN — SODIUM CHLORIDE 1000 ML: 9 INJECTION, SOLUTION INTRAVENOUS at 16:11

## 2023-03-19 RX ADMIN — DIPHENHYDRAMINE HYDROCHLORIDE 25 MG: 50 INJECTION, SOLUTION INTRAMUSCULAR; INTRAVENOUS at 16:18

## 2023-03-19 RX ADMIN — METOCLOPRAMIDE HYDROCHLORIDE 10 MG: 5 INJECTION INTRAMUSCULAR; INTRAVENOUS at 16:18

## 2023-03-19 RX ADMIN — LABETALOL HYDROCHLORIDE 10 MG: 5 INJECTION, SOLUTION INTRAVENOUS at 16:19

## 2023-03-19 NOTE — ED PROVIDER NOTES
Time: 2:42 PM EDT  Date of encounter:  3/19/2023  Independent Historian/Clinical History and Information was obtained by:   Patient  Chief Complaint   Patient presents with   • Blurred Vision   • Headache       History is limited by: N/A    History of Present Illness:  Patient is a 43 y.o. year old female who presents to the emergency department for evaluation of blurry vision that started about 1 hour ago.  Initially it was intermittent but now it is persistent.  She also complains of a frontal headache.  Denies nausea/vomiting, chills, photophobia.  Has history of headaches but this 1 feels different.  SCOTT Tellez    HPI    Patient Care Team  Primary Care Provider: Emmy Díaz MD    Past Medical History:     Allergies   Allergen Reactions   • Hydrocodone-Acetaminophen Hallucinations   • Codeine Other (See Comments)     Cold sweats     Past Medical History:   Diagnosis Date   • Arthritis    • Carpal tunnel syndrome    • Depression     HX   • GERD (gastroesophageal reflux disease)    • Helicobacter pylori infection 11/27/2015   • Hiatal hernia    • History of anemia    • History of blood clots     LT LEG, COUPLE YEARS AGO, TOOK ELIQUIS   • History of COVID-19 2020   • Hyperlipidemia    • Hypertension    • Knee pain, bilateral    • Migraine    • OAB (overactive bladder)    • Obesity    • Sleep apnea     CPAP     Past Surgical History:   Procedure Laterality Date   • BILATERAL BREAST REDUCTION     • CARPAL TUNNEL RELEASE Left    • DENTAL PROCEDURE     • DILATATION AND CURETTAGE     • ENDOMETRIAL ABLATION     • ENDOSCOPY N/A 10/18/2022    Procedure: ESOPHAGOGASTRODUODENOSCOPY WITH BIOPSY;  Surgeon: Kevin Fuentes Jr., MD;  Location: [facility] ENDOSCOPY;  Service: General;  Laterality: N/A;  PRE- GERD  POST- GASTRITIS   • GASTRIC SLEEVE LAPAROSCOPIC N/A 12/21/2022    Procedure: GASTRIC SLEEVE LAPAROSCOPIC WITH ROBOTIC, PARAESPHAGEAL HERRNIA REPAIR, LYSIS OF ADHESIONS;  Surgeon: Kevin Fuentes Jr., MD;   Location: [facility] OR [facility];  Service: Robotics - DaVinci;  Laterality: N/A;   • LAPAROSCOPIC CHOLECYSTECTOMY     • TONSILLECTOMY     • TUBAL ABDOMINAL LIGATION       Family History   Problem Relation Age of Onset   • Obesity Mother    • Hypertension Mother    • Hypertension Father    • Sleep apnea Father    • Sleep apnea Sister    • Hypertension Sister    • Malig Hyperthermia Neg Hx        Home Medications:  Prior to Admission medications    Medication Sig Start Date End Date Taking? Authorizing Provider   azithromycin (Zithromax Z-Agustin) 250 MG tablet Take 2 tablets by mouth on day 1, then 1 tablet daily on days 2-5 1/29/23   Rakan Melchor MD   brompheniramine-pseudoephedrine-DM 30-2-10 MG/5ML syrup Take 10 mL by mouth 4 (Four) Times a Day As Needed for Congestion or Cough. 1/29/23   Rakan Melchor MD   diclofenac (VOLTAREN) 75 MG EC tablet Take 75 mg by mouth 2 (Two) Times a Day. 12/16/22   Douglas Cedillo MD   Famotidine (PEPCID PO) Take 1 tablet by mouth Daily.    Douglas Cedillo MD   furosemide (LASIX) 40 MG tablet Take 40 mg by mouth Every Other Day. 8/4/22   Douglas Cedillo MD   lisinopril (PRINIVIL,ZESTRIL) 10 MG tablet Take 10 mg by mouth Daily. 1/9/23   Douglas Cedillo MD   multivitamin with minerals tablet tablet Take 1 tablet by mouth Daily.    Douglas Cedillo MD   polyethylene glycol (MIRALAX) 17 GM/SCOOP powder  1/9/23   Douglas Cedillo MD   potassium chloride 10 MEQ CR tablet Take 1 tablet by mouth As Needed. With lasix    Douglas Cedillo MD   predniSONE (DELTASONE) 20 MG tablet Take 2 tablets by mouth Daily. 1/29/23   Rakan Melchor MD   traZODone (DESYREL) 50 MG tablet TAKE 1/2 TO 1 TABLET BY MOUTH EVERY NIGHT AT BEDTIME AS NEEDED 1/9/23   Douglas Cedillo MD        Social History:   Social History     Tobacco Use   • Smoking status: Never   • Smokeless tobacco: Never   Vaping Use   • Vaping Use: Never used   Substance Use Topics   • Alcohol use: Not  "Currently     Comment: rare   • Drug use: Never         Review of Systems:  Review of Systems   Eyes: Positive for visual disturbance.   Neurological: Positive for headaches.        Physical Exam:  BP (!) 185/118   Pulse 54   Temp 98.1 °F (36.7 °C) (Oral)   Resp 16   Ht 157.5 cm (62\")   Wt (!) 142 kg (313 lb 7.9 oz)   SpO2 97%   BMI 57.34 kg/m²     Physical Exam  HENT:      Head: Normocephalic.      Mouth/Throat:      Mouth: Mucous membranes are moist.   Eyes:      Intraocular pressure: Right eye pressure is 14 mmHg. Left eye pressure is 14 mmHg. Measurements were taken using a handheld tonometer.     Extraocular Movements:      Right eye: Normal extraocular motion and no nystagmus.      Left eye: Normal extraocular motion and no nystagmus.      Pupils: Pupils are equal, round, and reactive to light.   Pulmonary:      Effort: Pulmonary effort is normal.   Abdominal:      General: There is no distension.   Musculoskeletal:      Cervical back: Neck supple.   Skin:     General: Skin is warm and dry.   Neurological:      General: No focal deficit present.      Mental Status: She is alert and oriented to person, place, and time.   Psychiatric:         Mood and Affect: Mood normal.         Behavior: Behavior normal.                  Procedures:  Procedures      Medical Decision Making:      Comorbidities that affect care:    migraine, Hypertension    External Notes reviewed:          The following orders were placed and all results were independently analyzed by me:  Orders Placed This Encounter   Procedures   • CT Head Without Contrast   • Comprehensive Metabolic Panel   • CBC Auto Differential   • CBC & Differential       Medications Given in the Emergency Department:  Medications   sodium chloride 0.9 % bolus 1,000 mL (0 mL Intravenous Stopped 3/19/23 1641)   metoclopramide (REGLAN) injection 10 mg (10 mg Intravenous Given 3/19/23 1618)   diphenhydrAMINE (BENADRYL) injection 25 mg (25 mg Intravenous Given " 3/19/23 1618)   labetalol (NORMODYNE,TRANDATE) injection 10 mg (10 mg Intravenous Given 3/19/23 1619)        ED Course:    The patient was initially evaluated in the triage area where orders were placed. The patient was later dispositioned by Piter Scales PA-C.      The patient was advised to stay for completion of workup which includes but is not limited to communication of labs and radiological results, reassessment and plan. The patient was advised that leaving prior to disposition by a provider could result in critical findings that are not communicated to the patient.     ED Course as of 03/27/23 1033   Sun Mar 19, 2023   1619 CT of the head negative for acute finding. [SF]   1619 Report given [SF]   1709 Report given to Piter Scales NP. Patient stable at time of transfer of care but BP remains elevated at this time. [SF]      ED Course User Index  [SF] Tianna Hunter APRN       Labs:    Lab Results (last 24 hours)     ** No results found for the last 24 hours. **           Imaging:    No Radiology Exams Resulted Within Past 24 Hours      Differential Diagnosis and Discussion:      Eye Pain/Blurred Vision: Differential diagnosis includes but is not limited to dacryocystitis, hordeolum, chalazion, periorbital cellulitis, cavernous sinus thrombosis, blepharitis, and glaucoma.  Headache: Differential diagnosis includes but is not limited to migraine, cluster headache, hypertension, tumor, subarachnoid bleeding, pseudotumor cerebri, temporal arteritis, infections, tension headache, and TMJ syndrome.    All labs were reviewed and interpreted by me.  CT scan radiology interpretation was reviewed by me.    MDM         Patient Care Considerations:          Consultants/Shared Management Plan:    I have discussed the case with Dr. Selby, supervising physician who states that the patient can be safely discharged with close follow up.    Social Determinants of Health:    Patient is independent, reliable, and  has access to care.       Disposition and Care Coordination:    Discharged: The patient is suitable and stable for discharge with no need for consideration of observation or admission.    IOP's WNL.  Headache and blurred vision completely resolved with migraine medications.  CT without acute findings.  Labs unremarkable. referred the patient to ophthalmology.  Discussed case with supervising physician is agreement with assessment plan and safety for discharge. Pt currently taking 20 mg lisinopril (was cut from 40 mg to 10 mg, currently taking double. rx for 30 mg lisinopril and advised close f/u with PCP.     I have explained the patient´s condition, diagnoses and treatment plan based on the information available to me at this time. I have answered questions and addressed any concerns. The patient has a good  understanding of the patient´s diagnosis, condition, and treatment plan as can be expected at this point. The vital signs have been stable. The patient´s condition is stable and appropriate for discharge from the emergency department.      The patient will pursue further outpatient evaluation with the primary care physician or other designated or consulting physician as outlined in the discharge instructions. They are agreeable to this plan of care and follow-up instructions have been explained in detail. The patient has received these instructions in written format and have expressed an understanding of the discharge instructions. The patient is aware that any significant change in condition or worsening of symptoms should prompt an immediate return to this or the closest emergency department or call to 911.  I have explained discharge medications and the need for follow up with the patient/caretakers. This was also printed in the discharge instructions. Patient was discharged with the following medications and follow up:      Medication List      Changed    lisinopril 10 MG tablet  Commonly known as:  PRINIVIL,ZESTRIL  Take 3 tablets by mouth Daily for 30 days.  What changed: how much to take        Stop    azithromycin 250 MG tablet  Commonly known as: Zithromax Z-Agustin     brompheniramine-pseudoephedrine-DM 30-2-10 MG/5ML syrup     diclofenac 75 MG EC tablet  Commonly known as: VOLTAREN     furosemide 40 MG tablet  Commonly known as: LASIX     predniSONE 20 MG tablet  Commonly known as: DELTASONE     traZODone 50 MG tablet  Commonly known as: DESYREL           Where to Get Your Medications      These medications were sent to [id number] - [place], [place] - [address] AT [facility] OF [address] & [place] - [phone]  - [phone] FX  [address], [place] [address]    Phone: [phone]   · lisinopril 10 MG tablet      Emmy Díaz MD  [address]  [address]  [facility] [address]  [phone]    Schedule an appointment as soon as possible for a visit       [facility] EMERGENCY ROOM  [address] [facility] [address] [phone]    If symptoms worsen    [facility] EYE [facility]  [address]  [facility] [address]  [phone]           Final diagnoses:   Hypertension, unspecified type   Nonintractable headache, unspecified chronicity pattern, unspecified headache type   Blurred vision        ED Disposition     ED Disposition   Discharge    Condition   Stable    Comment   --             This medical record created using voice recognition software.           Piter Scales PA-C  03/20/23 0213       Piter Scales PA-C  03/27/23 1032

## 2023-03-19 NOTE — DISCHARGE INSTRUCTIONS
Your CT scan looks good and the pressure in your eyes look good. Your symptoms have resolved with blood pressure control. I have increased your Lisinopril to 30 mg daily but I would like for you to call and schedule a follow-up appointment with PCP to discuss BP control. Return with any changes in vision, numbness, weakness, slurred speech. I have provided information for Ophthalmology if you continue to have blurred vision.

## 2023-03-21 ENCOUNTER — PATIENT ROUNDING (BHMG ONLY) (OUTPATIENT)
Dept: OBSTETRICS AND GYNECOLOGY | Age: 43
End: 2023-03-21
Payer: COMMERCIAL

## 2023-03-21 ENCOUNTER — OFFICE VISIT (OUTPATIENT)
Dept: OBSTETRICS AND GYNECOLOGY | Age: 43
End: 2023-03-21
Payer: COMMERCIAL

## 2023-03-21 VITALS
BODY MASS INDEX: 53.92 KG/M2 | SYSTOLIC BLOOD PRESSURE: 150 MMHG | DIASTOLIC BLOOD PRESSURE: 98 MMHG | WEIGHT: 293 LBS | HEIGHT: 62 IN

## 2023-03-21 DIAGNOSIS — E66.01 MORBID OBESITY WITH BMI OF 40.0-44.9, ADULT: ICD-10-CM

## 2023-03-21 DIAGNOSIS — Z12.4 SCREENING FOR CERVICAL CANCER: ICD-10-CM

## 2023-03-21 DIAGNOSIS — Z00.00 ENCOUNTER FOR ANNUAL PHYSICAL EXAM: ICD-10-CM

## 2023-03-21 DIAGNOSIS — Z01.419 ENCOUNTER FOR GYNECOLOGICAL EXAMINATION: ICD-10-CM

## 2023-03-21 DIAGNOSIS — Z12.31 BREAST CANCER SCREENING BY MAMMOGRAM: ICD-10-CM

## 2023-03-21 DIAGNOSIS — N92.6 IRREGULAR MENSES: Primary | ICD-10-CM

## 2023-03-21 RX ORDER — MEDROXYPROGESTERONE ACETATE 5 MG/1
5 TABLET ORAL DAILY
Qty: 30 TABLET | Refills: 4 | Status: SHIPPED | OUTPATIENT
Start: 2023-03-21

## 2023-03-21 NOTE — PROGRESS NOTES
A MY CHART MESSAGE HAS BEEN SENT TO THE PATIENT FOR [facility].

## 2023-03-21 NOTE — PROGRESS NOTES
Subjective     Chief Complaint   Patient presents with   • Gynecologic Exam     New gyn: annual and problem: discuss spotting and passing tissue,had ablation in 2015,painful intercourse         History of Present Illness    Wellness exam  Yancy Hernandez is a very pleasant  43 y.o. female who presents for annual exam.  Mammo Exam overdue, Contraception tubal ligation, Exercise daily activities    Patient scheduled for an annual exam as she is overdue but additionally she has some problems she wants to talk about    Patient has previously had an endometrial ablation and tubal ligation, she is 43 years old having some menopausal type symptoms and intermittently passing some blood clots.  She is also started have a little mild dyspareunia.  Does not have a lot of significant hot flashes or night sweats at this time.    Past medical history reviewed past surgical history reviewed    Patient has 8 kids, she cleans homes, she lives in [place]..      Obstetric History:  OB History    No obstetric history on file.        Menstrual History:     No LMP recorded (lmp unknown). Patient has had an ablation.       Sexual History:       Past Medical History:   Diagnosis Date   • Arthritis    • Carpal tunnel syndrome    • Depression     HX   • GERD (gastroesophageal reflux disease)    • Helicobacter pylori infection 11/27/2015   • Hiatal hernia    • History of anemia    • History of blood clots     LT LEG, COUPLE YEARS AGO, TOOK ELIQUIS   • History of COVID-19 2020   • Hyperlipidemia    • Hypertension    • Knee pain, bilateral    • Migraine    • OAB (overactive bladder)    • Obesity    • Sleep apnea     CPAP     Past Surgical History:   Procedure Laterality Date   • BILATERAL BREAST REDUCTION     • CARPAL TUNNEL RELEASE Left    • DENTAL PROCEDURE     • DILATATION AND CURETTAGE     • ENDOMETRIAL ABLATION     • ENDOSCOPY N/A 10/18/2022    Procedure: ESOPHAGOGASTRODUODENOSCOPY WITH BIOPSY;  Surgeon: Kevin Fuentes Jr., MD;   "Location: [facility] ENDOSCOPY;  Service: General;  Laterality: N/A;  PRE- GERD  POST- GASTRITIS   • GASTRIC SLEEVE LAPAROSCOPIC N/A 12/21/2022    Procedure: GASTRIC SLEEVE LAPAROSCOPIC WITH ROBOTIC, PARAESPHAGEAL HERRNIA REPAIR, LYSIS OF ADHESIONS;  Surgeon: Kevin Fuentes Jr., MD;  [place]  [facility] [place] [facility] [place]  Service: Robotics - DaVinci;  Laterality: N/A;   • LAPAROSCOPIC CHOLECYSTECTOMY     • TONSILLECTOMY     • TUBAL ABDOMINAL LIGATION         SOCIAL Hx:      The following portions of the patient's history were reviewed and updated as appropriate: allergies, current medications, past family history, past medical history, past social history, past surgical history and problem list.    Review of Systems        Except as outlined in history of physical illness, patient denies any changes in her GYN, , GI systems.  All other systems reviewed were negative.         Current Outpatient Medications:   •  Famotidine (PEPCID PO), Take 1 tablet by mouth Daily., Disp: , Rfl:   •  lisinopril (PRINIVIL,ZESTRIL) 10 MG tablet, Take 3 tablets by mouth Daily for 30 days., Disp: 90 tablet, Rfl: 0  •  multivitamin with minerals tablet tablet, Take 1 tablet by mouth Daily., Disp: , Rfl:   •  medroxyPROGESTERone (Provera) 5 MG tablet, Take 1 tablet by mouth Daily. First 10 days of each month, Disp: 30 tablet, Rfl: 4   Objective   Physical Exam    /98   Ht 157.5 cm (62\")   Wt (!) 141 kg (310 lb)   LMP  (LMP Unknown)   BMI 56.70 kg/m²     General: Patient is alert and oriented and appears overall healthy  Neck: Is supple without thyromegaly, no carotid bruits and no lymphadenopathy  Lungs: Clear bilaterally, no wheezing, rhonchi, or rales.  Respiratory rate is normal  Breast: Even, symmetrical, no lymphadenopathy, no retraction, no masses or cysts  Heart: Regular rate and rhythm are appreciated, no murmurs or rubs are heard  Abdomen: Is soft, without organomegaly, bowel sounds are positive, there is no rebound or guarding " and palpation does not produce any discomfort  Back: Nontender without CVA tenderness  Pelvic: External genitalia appear normal and consistent with mature female.  BUS normal                            Vagina is clean dry without discharge and appears adequately estrogenized, no lesions or masses are present                         Cervix is noninflamed without discharge or lesions.  There is no cervical motion tenderness.                Uterus is nonenlarged, without tenderness, and no masses or abnormalities are  present               Adnexa are non-enlarged, non tender               Rectal exam reveals adequate sphincter tone and no masses or lesions are appreciated on digital rectal examination.      Annual Well Woman Exam  Patient Active Problem List   Diagnosis   • Vitamin D deficiency   • Nonalcoholic fatty liver disease   • Iron deficiency anemia   • Edema   • Benign essential hypertension   • Morbid obesity with BMI of 50.0-59.9, adult ([facility])   • Dietary counseling   • HORTENCIA (obstructive sleep apnea)   • History of DVT (deep vein thrombosis)   • GERD (gastroesophageal reflux disease)   • Multiple joint pain   • Borderline hyperlipidemia   • Hiatal hernia   • Bilateral leg edema   • Urinary incontinence in female   • S/P laparoscopic sleeve gastrectomy   • Chronic fatigue   • Primary insomnia   • Urinary retention   • Morbid obesity with BMI of 40.0-44.9, adult ([facility])                 Assessment & Plan   Diagnoses and all orders for this visit:    1. Irregular menses (Primary)  -     Follicle Stimulating Hormone  -     Estradiol    2. Screening for cervical cancer    3. Breast cancer screening by mammogram  -     Mammo Screening Digital Tomosynthesis Bilateral With CAD; Future    4. Morbid obesity with BMI of 40.0-44.9, adult ([facility])    5. Encounter for annual physical exam    Other orders  -     medroxyPROGESTERone (Provera) 5 MG tablet; Take 1 tablet by mouth Daily. First 10 days of each month  Dispense: 30  tablet; Refill: 4    Exam was reassuring today, we will get patient back up to speed with her mammogram Pap smear for wellness annual exam concerns.    Additionally, we will check some hormone levels, cycle her with Provera 10 days each month, and check an ultrasound in a couple months time.    Reviewed annual exam findings  Reviewed additional problem concerns.  Patient voiced understanding and agreement with plan      Discussed today's findings and concerns with patient.  Continue to recommend regular exercise including cardiovascular and resistance training as well as  breast self-exam. Wellness lab, mammography, & pap smear, in accordance with age guidelines.    I have encouraged her to call for today's test results if she has not received them within 10 days.  Patient is advised to call with any change in her condition or with any other questions, otherwise return  for annual examination.

## 2023-03-22 LAB
ESTRADIOL SERPL-MCNC: 64.2 PG/ML
FSH SERPL-ACNC: 5.9 MIU/ML

## 2023-03-27 LAB
CYTOLOGIST CVX/VAG CYTO: NORMAL
CYTOLOGY CVX/VAG DOC CYTO: NORMAL
CYTOLOGY CVX/VAG DOC THIN PREP: NORMAL
DX ICD CODE: NORMAL
HIV 1 & 2 AB SER-IMP: NORMAL
HPV I/H RISK 4 DNA CVX QL PROBE+SIG AMP: NEGATIVE
OTHER STN SPEC: NORMAL
STAT OF ADQ CVX/VAG CYTO-IMP: NORMAL

## 2023-04-07 ENCOUNTER — OFFICE VISIT (OUTPATIENT)
Dept: BARIATRICS/WEIGHT MGMT | Facility: CLINIC | Age: 43
End: 2023-04-07
Payer: COMMERCIAL

## 2023-04-07 VITALS
TEMPERATURE: 98.2 F | HEIGHT: 62 IN | BODY MASS INDEX: 53.92 KG/M2 | SYSTOLIC BLOOD PRESSURE: 150 MMHG | HEART RATE: 80 BPM | DIASTOLIC BLOOD PRESSURE: 90 MMHG | WEIGHT: 293 LBS

## 2023-04-07 DIAGNOSIS — Z71.3 DIETARY COUNSELING: ICD-10-CM

## 2023-04-07 DIAGNOSIS — Z98.84 S/P LAPAROSCOPIC SLEEVE GASTRECTOMY: ICD-10-CM

## 2023-04-07 DIAGNOSIS — E66.01 MORBID OBESITY WITH BMI OF 50.0-59.9, ADULT: Primary | ICD-10-CM

## 2023-04-07 PROCEDURE — 1160F RVW MEDS BY RX/DR IN RCRD: CPT | Performed by: NURSE PRACTITIONER

## 2023-04-07 PROCEDURE — 3080F DIAST BP >= 90 MM HG: CPT | Performed by: NURSE PRACTITIONER

## 2023-04-07 PROCEDURE — 1159F MED LIST DOCD IN RCRD: CPT | Performed by: NURSE PRACTITIONER

## 2023-04-07 PROCEDURE — 3077F SYST BP >= 140 MM HG: CPT | Performed by: NURSE PRACTITIONER

## 2023-04-07 PROCEDURE — 99214 OFFICE O/P EST MOD 30 MIN: CPT | Performed by: NURSE PRACTITIONER

## 2023-04-07 RX ORDER — TRAZODONE HYDROCHLORIDE 50 MG/1
TABLET ORAL
COMMUNITY
Start: 2023-04-06

## 2023-04-07 RX ORDER — BENAZEPRIL HYDROCHLORIDE AND HYDROCHLOROTHIAZIDE 20; 12.5 MG/1; MG/1
TABLET ORAL
COMMUNITY
Start: 2023-04-06

## 2023-04-07 NOTE — PROGRESS NOTES
[facility] BARIATRIC SURGERY  [address] [facility] [address]  [facility] [address]  [phone]  [address] [facility] [address]  [facility] [address]  [phone]  Dept: [phone]  4/7/2023      Yancy Hernandez.  [id number]  [id number]  1980  female      Chief Complaint   Patient presents with   • Follow-up     3 mo fup sleeve       BH Post-Op Bariatric Surgery:   Yancy Hernandez is status post Laparoscopic Sleeve with PEHR procedure, performed on 12/21/2022     HPI:   Today's weight is (!) 142 kg (312 lb) pounds, today's BMI is Body mass index is 57.05 kg/m².,has a  loss of 14 pounds since the last visit andweight loss since surgery is 43 pounds. The patient reports a decreased portion size and loss of appetite.      Yancy Hernandez denies nausea, vomiting, reflux, dysphagia and reports tolerating regular diet.  Has purchased containers to help her portion food out.  Sometimes she goes over 4 oz of foods but does try and eat slow.  She drinks 2 protein shakes per day.    B: some days breakfast burrito from [employer]/shake  L: shake/chicken salad/chicken sandwich/ salad  D: empanadas with shrimp/beans and rice/shake  S: donut rarely/almond alejandro/cheetos puffs     Diet and Exercise: Diet history reviewed and discussed with the patient. Weight loss/gains to date discussed with the patient. The patient states they are eating unknown; 60+ grams of protein per day. She reports eating 4 meals per day, a typical portion size of 1/2 cup, eating 4 snacks per day, drinking 5+ or more 8-oz. glasses of water per day, no carbonated beverage consumption and exercising regularly.     Supplements: Centrum.     Review of Systems   Constitutional: Positive for activity change and appetite change.   Respiratory: Negative for shortness of breath.    Cardiovascular: Negative for chest pain.   All other systems reviewed and are negative.      Patient Active Problem List   Diagnosis   •  Vitamin D deficiency   • Nonalcoholic fatty liver disease   • Iron deficiency anemia   • Edema   • Benign essential hypertension   • Morbid obesity with BMI of 50.0-59.9, adult   • Dietary counseling   • HORTENCIA (obstructive sleep apnea)   • History of DVT (deep vein thrombosis)   • GERD (gastroesophageal reflux disease)   • Multiple joint pain   • Borderline hyperlipidemia   • Hiatal hernia   • Bilateral leg edema   • Urinary incontinence in female   • S/P laparoscopic sleeve gastrectomy   • Chronic fatigue   • Primary insomnia   • Urinary retention   • Morbid obesity with BMI of 40.0-44.9, adult       Past Medical History:   Diagnosis Date   • Arthritis    • Carpal tunnel syndrome    • Depression     HX   • GERD (gastroesophageal reflux disease)    • Helicobacter pylori infection 11/27/2015   • Hiatal hernia    • History of anemia    • History of blood clots     LT LEG, COUPLE YEARS AGO, TOOK ELIQUIS   • History of COVID-19 2020   • Hyperlipidemia    • Hypertension    • Knee pain, bilateral    • Migraine    • OAB (overactive bladder)    • Obesity    • Sleep apnea     CPAP       The following portions of the patient's history were reviewed and updated as appropriate: allergies, current medications, past medical history, past surgical history and problem list.    Vitals:    04/07/23 1048   BP: 150/90   Pulse: 80   Temp: 98.2 °F (36.8 °C)       Physical Exam  Vitals reviewed.   Constitutional:       General: She is not in acute distress.     Appearance: Normal appearance. She is morbidly obese.   HENT:      Head: Normocephalic and atraumatic.      Mouth/Throat:      Mouth: Mucous membranes are moist.      Pharynx: Oropharynx is clear.   Eyes:      General: No scleral icterus.     Extraocular Movements: Extraocular movements intact.      Conjunctiva/sclera: Conjunctivae normal.      Pupils: Pupils are equal, round, and reactive to light.   Cardiovascular:      Rate and Rhythm: Normal rate and regular rhythm.      Heart  sounds: Normal heart sounds. No murmur heard.    No gallop.   Pulmonary:      Effort: Pulmonary effort is normal. No respiratory distress.   Abdominal:      General: Bowel sounds are normal.      Palpations: Abdomen is soft.   Musculoskeletal:         General: Normal range of motion.      Cervical back: Normal range of motion and neck supple.   Skin:     General: Skin is warm and dry.   Neurological:      General: No focal deficit present.      Mental Status: She is alert and oriented to person, place, and time.   Psychiatric:         Mood and Affect: Mood normal.         Behavior: Behavior normal.         Thought Content: Thought content normal.         Judgment: Judgment normal.         Assessment:   Post-op, the patient is doing well.     Encounter Diagnoses   Name Primary?   • Morbid obesity with BMI of 50.0-59.9, adult Yes   • S/P laparoscopic sleeve gastrectomy    • Dietary counseling        Plan:   Reviewed proper postoperative diet.  Needs to cut back on the types of snacks she is having.  Should not need to eat 8 times per day.  High protein dense foods and foods high in fiber will keep her satisfied for longer.    Given list of high protein snacks  Given new binder  Will get labs  Encouraged patient to be sure to get plenty of lean protein per day through small frequent meals all with a protein source.   Activity restrictions: none.   Recommended patient be sure to get at least 70 grams of protein per day by eating small, frequent meals all with high lean protein choices. Be sure to limit/cut back on daily carbohydrate intake. Discussed with the patient the recommended amount of water per day to intake- half of body weight in ounces. Reviewed vitamin requirements. Be sure to do routine exercise, 150 minutes per week minimum, including both cardio and strength training.     Instructions / Recommendations: dietary counseling recommended, recommended a daily protein intake of  grams, vitamin  supplement(s) recommended, recommended exercising at least 150 minutes per week, behavior modifications recommended and instructed to call the office for concerns, questions, or problems.     The patient was instructed to follow up in 3 months .     Total time spent during this encounter today was 35 minutes

## 2023-04-07 NOTE — PATIENT INSTRUCTIONS
Building a Healthy Snack after Bariatric Surgery    Ideally, a healthy snack consists of:    Protein + Healthy Fat*    OR    Protein + Fiber    OR    Protein + Fiber + Healthy Fat*    Some examples include:    Protein:    Greek yogurt, Beef jerky, cottage cheese, boiled egg, deli meat, string cheese, tuna    Fiber:    Any fruit or vegetable, low carb wrap, whole grain crackers    Healthy Fat:    Nuts, seeds, avocado, olives, peanut butter, cream cheese, hummus    Now mix and match!    1. Cottage cheese (protein) and peaches (fruit)    2. String cheese (protein) and 1 oz. cashews (healthy fat)    3. Deli meat (protein) on a low carb wrap (fiber)    4. Beef jerky (protein) with carrots (fiber) and 2 tbsp. hummus (healthy fat)    5. Greek yogurt (protein) with blueberries (fiber) and 1 oz. sliced almonds (healthy fat)    *due to high calorie content of healthy fats, measuring your portion size is recommended     Snack Ideas after Bariatric Surgery  1. Beef or turkey Jerky  2. Oikos Triple Zero Greek yogurt with berries and sprinkle of granola or sliced almonds  3. String cheese and medium apple  4. Deli meat & cheese roll ups  5. Edamame in the Pod ([place] Steamfresh)   6. Roasted/air fryer chickpeas  7. Quest Protein chips  8. Tuna pouch with 4-5 whole grain crackers  9. Hardboiled egg  10. Cottage cheese with fruit or tomatoes  11. Shrimp cocktail  12. Raw vegetables with hummus or black bean dip  13. P3 snacks  14. 1 oz nuts (any type) and air popped popcorn  15. Trail mix, ¼ cup  16. Protein smoothie  17. Egg salad or deviled eggs with low-fat esquivel   18. Ricotta cheese with vanilla extract, cinnamon and sweetener  19. Protein pudding (Premier or Fairlife shake mixed with 2 tbsp sugar free pudding mix)  20. Power Crunch Bar  21. Quest Protein Bar  22. Apple with 1 tbsp peanut butter  23. Egg bites (eggs or egg whites, vegetables and cheese cooked in muffin tin)  24. Protein energy bites  25. Basilio Pudding  26.  High protein overnight oatmeal (1/2 cup oats, 2/3 cup protein shake, 1 tsp mira or flax   and refrigerate overnight)  27. Refried beans with cheese (roll in low carb tortilla, optional)  28. Veggies and Ranch dip (Greek yogurt mixed w/ ranch seasoning packet)  29. Celery with 1 tbsp nut butter  30. Protein coffee (protein shake mixed with iced espresso or cold brew concentrate)   31. Rotan Cakes Protein Balls (box mix)   32. Enlightened Bada Bean Bada Boom   33. Biena Chickpea snacks  34. Whisps cheese crisps  35. Turkey pepperoni and cheese slices  36. Popcorners Flex Protein Crisps  37. Sargento Balanced Breaks  38. Fruit & nut bars ( such as Larabars)   39. Hippeas Chickpea Puffs  40. Rotisserie chicken  41. Kashi GO Original cereal  42. Fage Greek yogurt  43. Sung bread with deli meat  44. Turkey or chicken sausage links   45. Smoked salmon  46. Chomps turkey stick  47. [place] greek style vanilla unsweetened almond milk yogurt  48. DIY trail mix (1 tbsp each of almonds, pepitas, walnuts, dried fruit and dark chocolate   chips)  49. Rotan Cakes Blueberry power waffle   50. Baby bel cheese

## 2023-04-20 ENCOUNTER — TELEPHONE (OUTPATIENT)
Dept: BARIATRICS/WEIGHT MGMT | Facility: CLINIC | Age: 43
End: 2023-04-20
Payer: COMMERCIAL

## 2023-05-31 ENCOUNTER — OFFICE VISIT (OUTPATIENT)
Dept: OBSTETRICS AND GYNECOLOGY | Age: 43
End: 2023-05-31

## 2023-05-31 VITALS
DIASTOLIC BLOOD PRESSURE: 74 MMHG | HEIGHT: 62 IN | BODY MASS INDEX: 53.92 KG/M2 | SYSTOLIC BLOOD PRESSURE: 136 MMHG | WEIGHT: 293 LBS

## 2023-05-31 DIAGNOSIS — N93.9 ABNORMAL UTERINE BLEEDING (AUB): Primary | ICD-10-CM

## 2023-05-31 RX ORDER — MEDROXYPROGESTERONE ACETATE 5 MG/1
5 TABLET ORAL DAILY
Qty: 30 TABLET | Refills: 4 | Status: SHIPPED | OUTPATIENT
Start: 2023-05-31

## 2023-05-31 NOTE — PROGRESS NOTES
Subjective       History of Present Illness  Yancy Hernandez is a 43 y.o. female is being seen today for history of abnormal uterine bleeding  Chief Complaint   Patient presents with   • Gynecologic Exam     Gyn u/s: provera x 3 months follow up: irregular menses   .        The following portions of the patient's history were reviewed and updated as appropriate: allergies, current medications, past family history, past medical history, past social history, past surgical history and problem list.    PAST MEDICAL HISTORY  Past Medical History:   Diagnosis Date   • Arthritis    • Carpal tunnel syndrome    • Depression     HX   • GERD (gastroesophageal reflux disease)    • Helicobacter pylori infection 2015   • Hiatal hernia    • History of anemia    • History of blood clots     LT LEG, COUPLE YEARS AGO, TOOK ELIQUIS   • History of COVID-19 2020   • Hyperlipidemia    • Hypertension    • Knee pain, bilateral    • Migraine    • OAB (overactive bladder)    • Obesity    • Sleep apnea     CPAP     OB History    Para Term  AB Living   8 8 8     8   SAB IAB Ectopic Molar Multiple Live Births             8      # Outcome Date GA Lbr Kevin/2nd Weight Sex Delivery Anes PTL Lv   8 Term    2892 g (6 lb 6 oz) M Vag-Spont   JAMIN   7 Term    3657 g (8 lb 1 oz) F Vag-Spont   JAMIN   6 Term    3884 g (8 lb 9 oz) M Vag-Spont   JAMIN   5 Term    3657 g (8 lb 1 oz) F Vag-Spont   JAMIN   4 Term    3771 g (8 lb 5 oz) M Vag-Spont   JAMIN   3 Term    3515 g (7 lb 12 oz) M Vag-Spont   JAMIN   2 Term    3345 g (7 lb 6 oz) F Vag-Spont   JAMIN   1 Term    2722 g (6 lb) M Vag-Spont   JAMIN     Past Surgical History:   Procedure Laterality Date   • BILATERAL BREAST REDUCTION     • CARPAL TUNNEL RELEASE Left    • DENTAL PROCEDURE     • DILATATION AND CURETTAGE     • ENDOMETRIAL ABLATION     • ENDOSCOPY N/A 10/18/2022    Procedure: ESOPHAGOGASTRODUODENOSCOPY WITH BIOPSY;  Surgeon: Kevin Fuentes Jr., MD;   Location:  [facility] ENDOSCOPY;  Service: General;  Laterality: N/A;  PRE- GERD  POST- GASTRITIS   • GASTRIC SLEEVE LAPAROSCOPIC N/A 12/21/2022    Procedure: GASTRIC SLEEVE LAPAROSCOPIC WITH ROBOTIC, PARAESPHAGEAL HERRNIA REPAIR, LYSIS OF ADHESIONS;  Surgeon: Kevin Fuentes Jr., MD;  [place]  [facility] [place];  Service: Robotics - DaVinci;  Laterality: N/A;   • LAPAROSCOPIC CHOLECYSTECTOMY     • TONSILLECTOMY     • TUBAL ABDOMINAL LIGATION       Family History   Problem Relation Age of Onset   • Obesity Mother    • Hypertension Mother    • Hypertension Father    • Sleep apnea Father    • Sleep apnea Sister    • Hypertension Sister    • Malig Hyperthermia Neg Hx      Social History     Tobacco Use   Smoking Status Never   Smokeless Tobacco Never       Current Outpatient Medications:   •  benazepril-hydrochlorthiazide (LOTENSIN HCT) 20-12.5 MG per tablet, , Disp: , Rfl:   •  Famotidine (PEPCID PO), Take 1 tablet by mouth Daily., Disp: , Rfl:   •  medroxyPROGESTERone (Provera) 5 MG tablet, Take 1 tablet by mouth Daily. First 10 days of each month, Disp: 30 tablet, Rfl: 4  •  multivitamin with minerals tablet tablet, Take 1 tablet by mouth Daily., Disp: , Rfl:   •  traZODone (DESYREL) 50 MG tablet, , Disp: , Rfl:   •  lisinopril (PRINIVIL,ZESTRIL) 10 MG tablet, Take 3 tablets by mouth Daily for 30 days., Disp: 90 tablet, Rfl: 0  Immunization History   Administered Date(s) Administered   • COVID-19 (MODERNA) 1st,2nd,3rd Dose Monovalent 06/18/2021       Review of Systems       Except as outlined in history of physical illness, patient denies any changes in her GYN, , GI systems. All other systems reviewed are negative.    Objective   Physical Exam   Alert and oriented, respirations unlabored, heart regular rate and rhythm   Pelvic see ultrasound report      Assessment & Plan   Diagnoses and all orders for this visit:    1. Abnormal uterine bleeding (AUB) (Primary)    Other orders  -     medroxyPROGESTERone (Provera) 5 MG  tablet; Take 1 tablet by mouth Daily. First 10 days of each month  Dispense: 30 tablet; Refill: 4    Patient had been treated with Provera 5 mg first 10 days each month for 2 to 3 months, today's ultrasound is reassuring endometrial stripe is smooth thin and less than 7 mm.  Bleeding has gotten back to a normal light routine.  She would like to continue the Provera 5 mg first 10 days of each month.  She will call if she has any change in her bleeding pattern or questions otherwise will return for regular visit             No orders of the defined types were placed in this encounter.          EMR Dragon/ Transcription disclaimer:  Much of the encounter note is an electronic transcription/translation of spoken language to printed text. The electronic translation of spoken language may permit erroneous, or at times, nonessential words or phrases to be inadvertently transcribes; Although i have reviewed the note for such errors, some may still exist.

## 2023-07-07 PROBLEM — K44.9 HIATAL HERNIA: Status: RESOLVED | Noted: 2022-10-18 | Resolved: 2023-07-07

## 2023-07-07 PROBLEM — E66.813 CLASS 3 SEVERE OBESITY DUE TO EXCESS CALORIES WITH SERIOUS COMORBIDITY AND BODY MASS INDEX (BMI) OF 50.0 TO 59.9 IN ADULT: Status: ACTIVE | Noted: 2023-03-21

## 2023-07-07 PROBLEM — E66.01 MORBID OBESITY WITH BMI OF 50.0-59.9, ADULT: Status: RESOLVED | Noted: 2022-08-23 | Resolved: 2023-07-07

## 2023-07-07 PROBLEM — E66.01 MORBID OBESITY WITH BMI OF 40.0-44.9, ADULT: Status: RESOLVED | Noted: 2023-03-21 | Resolved: 2023-07-07

## 2023-10-10 ENCOUNTER — OFFICE VISIT (OUTPATIENT)
Dept: BARIATRICS/WEIGHT MGMT | Facility: CLINIC | Age: 43
End: 2023-10-10
Payer: COMMERCIAL

## 2023-10-10 VITALS
HEIGHT: 62 IN | WEIGHT: 293 LBS | SYSTOLIC BLOOD PRESSURE: 149 MMHG | HEART RATE: 104 BPM | TEMPERATURE: 98.2 F | BODY MASS INDEX: 53.92 KG/M2 | DIASTOLIC BLOOD PRESSURE: 95 MMHG

## 2023-10-10 DIAGNOSIS — G47.33 OSA (OBSTRUCTIVE SLEEP APNEA): ICD-10-CM

## 2023-10-10 DIAGNOSIS — Z98.84 S/P LAPAROSCOPIC SLEEVE GASTRECTOMY: ICD-10-CM

## 2023-10-10 DIAGNOSIS — E66.01 CLASS 3 SEVERE OBESITY DUE TO EXCESS CALORIES WITH SERIOUS COMORBIDITY AND BODY MASS INDEX (BMI) OF 50.0 TO 59.9 IN ADULT: Primary | ICD-10-CM

## 2023-10-10 DIAGNOSIS — K21.9 GASTROESOPHAGEAL REFLUX DISEASE, UNSPECIFIED WHETHER ESOPHAGITIS PRESENT: ICD-10-CM

## 2023-10-10 DIAGNOSIS — K76.0 NONALCOHOLIC FATTY LIVER DISEASE: ICD-10-CM

## 2023-10-10 PROCEDURE — 1160F RVW MEDS BY RX/DR IN RCRD: CPT | Performed by: SURGERY

## 2023-10-10 PROCEDURE — 1159F MED LIST DOCD IN RCRD: CPT | Performed by: SURGERY

## 2023-10-10 PROCEDURE — 99214 OFFICE O/P EST MOD 30 MIN: CPT | Performed by: SURGERY

## 2023-10-10 RX ORDER — FAMOTIDINE 20 MG/1
20 TABLET, FILM COATED ORAL 2 TIMES DAILY
COMMUNITY

## 2023-10-10 NOTE — PROGRESS NOTES
[facility]ATR[facility] BARIATRIC SURGERY  [address] [facility] [address]  [facility] [address]  [phone]  [address] [address]  [phone]  Dept: [phone]  10/10/2023      Yancy Hernandez.  [id number]  [id number]  1980  female      Chief Complaint   Patient presents with    Follow-up     10 follow up sleeve        BH Post-Op Bariatric Surgery:   Yancy Hernandez is status post Laparoscopic gastric sleeve w/ PHR procedure, performed on 12/21/22     HPI:   Today's weight is (!) 140 kg (308 lb) pounds, today's BMI is Body mass index is 56.32 kg/mý., a  loss of 2 pounds since the last visit and weight loss since surgery is 47 pounds.    Yancy Hernandez denies nausea vomiting fever chills chest pain shortness of air or lower extremity pain and reports some pain just to the right at the umbilicus at times.  Describes it as a sharp stabbing type pain.  She went through her daily routine and is concentrating on clean eating.  She has been doing her oatmeal in the morning for breakfast.  Using honey instead of sugar.  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     Diet and Exercise: Diet history reviewed and discussed with the patient. Weight loss/gains to date discussed with the patient. The patient states they are eating around unknown grams of protein per day. She reports eating 2 meals per day, a typical portion size of 1 cup, eating 2 snacks per day, drinking 5 or more 8-oz. glasses of water per day, no carbonated beverage consumption and exercising regularly.     Supplements: Multivitamin iron, calcium.     Review of Systems   Constitutional:  Positive for fatigue.   Gastrointestinal:  Positive for abdominal pain.   Musculoskeletal:  Positive for arthralgias.   All other systems reviewed and  are negative.        * No active hospital problems. *      Past Medical History:   Diagnosis Date    Arthritis     Carpal tunnel syndrome     Depression     HX    GERD (gastroesophageal reflux disease)     Helicobacter pylori infection 11/27/2015    Hiatal hernia     History of anemia     History of blood clots     LT LEG, COUPLE YEARS AGO, TOOK ELIQUIS    History of COVID-19 2020    Hyperlipidemia     Hypertension     Knee pain, bilateral     Migraine     OAB (overactive bladder)     Obesity     Sleep apnea     CPAP       The following portions of the patient's history were reviewed and updated as appropriate: allergies, current medications, past family history, past medical history, past social history, past surgical history, and problem list.    Vitals:    10/10/23 1517   BP: 149/95   Pulse: 104   Temp: 98.2 øF (36.8 øC)       Physical Exam  Constitutional:       Appearance: Normal appearance.   HENT:      Head: Normocephalic and atraumatic.   Eyes:      Conjunctiva/sclera: Conjunctivae normal.   Pulmonary:      Effort: Pulmonary effort is normal.   Abdominal:      General: Abdomen is flat. There is no distension.      Palpations: Abdomen is soft. There is no mass.      Tenderness: There is no abdominal tenderness. There is no guarding or rebound.      Hernia: No hernia is present.      Comments: No hernia palpated at area of tenderness.  This is the area of the right lower quadrant incision where stomach was removed and fascial suture head was placed.   Neurological:      Mental Status: She is alert and oriented to person, place, and time.   Psychiatric:         Mood and Affect: Mood normal.         Behavior: Behavior normal.         Thought Content: Thought content normal.         Judgment: Judgment normal.         Assessment:   Post-op, the patient 10-month status post laparoscopic sleeve gastrectomy and paraesophageal hernia repair who has lost 2 pounds since last visit 2 months ago.  She will get back on  track with her weight loss and would like to do the meal replacement shakes to give her a jumpstart with her weight loss.  We went through her daily routine and will concentrate on clean eating and also time restricted eating.  She will continue with her walking exercise but recommend to add some strength training.  I did not feel a hernia on exam where she is tender.  We may need to proceed with CAT scan of the abdomen if pain does not resolve.  She states it is more intermittent and not severe.  She denies any diarrhea constipation but I instructed her to try Metamucil daily for 2 weeks to see if this will help with any of that pain.  Blood pressure slightly elevated and will continue with her blood pressure medication. .     Encounter Diagnoses   Name Primary?    Class 3 severe obesity due to excess calories with serious comorbidity and body mass index (BMI) of 50.0 to 59.9 in adult Yes    S/P laparoscopic sleeve gastrectomy     Gastroesophageal reflux disease, unspecified whether esophagitis present     Nonalcoholic fatty liver disease     HORTENCIA (obstructive sleep apnea)        Plan:     Encouraged patient to be sure to get plenty of lean protein per day through small meals all with a protein source.   Activity restrictions: none.   Recommended patient be sure to get at least 70 grams of protein per day by eating small  meals all with high lean protein choices. Be sure to limit/cut back on daily carbohydrate intake. Discussed with the patient the recommended amount of water per day to intake- half of body weight in ounces. Reviewed vitamin requirements. Be sure to do routine exercise, 150 minutes per week minimum, including both cardio and strength training.     Instructions / Recommendations: dietary counseling recommended, recommended a daily protein intake of  grams, vitamin supplement(s) recommended, recommended exercising at least 150 minutes per week, behavior modifications recommended and instructed  to call the office for concerns, questions, or problems.     The patient was instructed to follow up in 1 month.     The patient was counseled regarding the above procedure. Total time spent on this encounter was  30 minutes including reviewing previous notes, lab results and face to face time spent with the patient.  The majority of time was spent counseling the patient regarding diet and exercise as well as reviewing their medications and their compliance with the procedure.

## 2023-10-25 NOTE — TELEPHONE ENCOUNTER
Spoke to patient today and she is 4 mo out from surgery. She wore a tight girdle all day yesterday but today she ate a sandwich with OJ and had quite a bit of a burning sensation in stomach. She said she has never felt this and drinks OJ everyday. I advised soft foods today, nothing acidic to drink or eat, no girdle. If she is not feeling better tomorrow please call back  
Same as Pre-Op Diagnosis

## 2023-11-16 ENCOUNTER — OFFICE VISIT (OUTPATIENT)
Dept: OBSTETRICS AND GYNECOLOGY | Age: 43
End: 2023-11-16
Payer: COMMERCIAL

## 2023-11-16 VITALS
HEIGHT: 62 IN | DIASTOLIC BLOOD PRESSURE: 82 MMHG | SYSTOLIC BLOOD PRESSURE: 150 MMHG | BODY MASS INDEX: 53.92 KG/M2 | WEIGHT: 293 LBS

## 2023-11-16 DIAGNOSIS — N90.7 INCLUSION CYST OF VULVA: Primary | ICD-10-CM

## 2023-11-16 NOTE — PROGRESS NOTES
"Subjective     Chief Complaint   Patient presents with    Gynecologic Exam     Lump in vaginal area       Yancy Hernandez is a 43 y.o.  whose LMP is No LMP recorded. Patient has had an ablation.     Pt presents today with chief complaint of cyst to right labia she noticed over the weekend while cleaning herself  She states its \"annoying when touched\" but not painful  No abnormal discharge  She is , no STD risk  Pt of Dr. Ramirez     No Additional Complaints Reported    The following portions of the patient's history were reviewed and updated as appropriate:vital signs, allergies, current medications, past medical history, past social history, past surgical history, and problem list      Review of Systems   Pertinent items are noted in HPI.     Objective      /82   Ht 157.5 cm (62\")   Wt (!) 139 kg (306 lb)   BMI 55.97 kg/m²     Physical Exam  Genitourinary:         Comments: Pea sized round mobile cyst noted to right vulva        General:   alert, no distress, and morbidly obese   Heart: Not performed today   Lungs: Not performed today.   Breast: Not performed today   Neck: na   Abdomen: {Not performed today   CVA: Not performed today   Pelvis: External genitalia: see pic   Extremities: Not performed today   Neurologic: negative   Psychiatric: Normal affect, judgement, and mood       Lab Review   Labs: No data reviewed     Imaging   No data reviewed    Assessment & Plan     ASSESSMENT  1. Inclusion cyst of vulva        PLAN  1. No orders of the defined types were placed in this encounter.      2. Medications prescribed this encounter:      No orders of the defined types were placed in this encounter.      3. Reassurance given of benign nature of these cysts. Will plan follow up in 3 months to recheck with Dr. Ramirez. Discussed normally leave these along unless becoming larger/painful.      Leesa Miranda, APRN  2023           "

## 2024-01-17 ENCOUNTER — LAB (OUTPATIENT)
Dept: LAB | Facility: HOSPITAL | Age: 44
End: 2024-01-17
Payer: COMMERCIAL

## 2024-01-17 ENCOUNTER — TRANSCRIBE ORDERS (OUTPATIENT)
Dept: ADMINISTRATIVE | Facility: HOSPITAL | Age: 44
End: 2024-01-17
Payer: COMMERCIAL

## 2024-01-17 DIAGNOSIS — R53.82 CHRONIC FATIGUE: ICD-10-CM

## 2024-01-17 DIAGNOSIS — R60.0 LOCALIZED EDEMA: ICD-10-CM

## 2024-01-17 DIAGNOSIS — E55.9 VITAMIN D DEFICIENCY: ICD-10-CM

## 2024-01-17 DIAGNOSIS — D50.9 IRON DEFICIENCY ANEMIA, UNSPECIFIED IRON DEFICIENCY ANEMIA TYPE: ICD-10-CM

## 2024-01-17 DIAGNOSIS — Z86.718 HISTORY OF DVT (DEEP VEIN THROMBOSIS): ICD-10-CM

## 2024-01-17 DIAGNOSIS — Z71.3 DIETARY COUNSELING: ICD-10-CM

## 2024-01-17 DIAGNOSIS — G47.33 OSA (OBSTRUCTIVE SLEEP APNEA): ICD-10-CM

## 2024-01-17 DIAGNOSIS — R32 URINARY INCONTINENCE IN FEMALE: ICD-10-CM

## 2024-01-17 DIAGNOSIS — M25.50 MULTIPLE JOINT PAIN: ICD-10-CM

## 2024-01-17 DIAGNOSIS — Z00.00 ROUTINE MEDICAL EXAM: Primary | ICD-10-CM

## 2024-01-17 DIAGNOSIS — E66.01 MORBID OBESITY WITH BMI OF 50.0-59.9, ADULT: ICD-10-CM

## 2024-01-17 DIAGNOSIS — I10 BENIGN ESSENTIAL HYPERTENSION: ICD-10-CM

## 2024-01-17 DIAGNOSIS — Z98.84 S/P LAPAROSCOPIC SLEEVE GASTRECTOMY: ICD-10-CM

## 2024-01-17 DIAGNOSIS — K76.0 NONALCOHOLIC FATTY LIVER DISEASE: ICD-10-CM

## 2024-01-17 DIAGNOSIS — E78.00 HIGH CHOLESTEROL: ICD-10-CM

## 2024-01-17 DIAGNOSIS — E78.5 BORDERLINE HYPERLIPIDEMIA: ICD-10-CM

## 2024-01-17 DIAGNOSIS — Z00.00 ROUTINE MEDICAL EXAM: ICD-10-CM

## 2024-01-17 LAB
25(OH)D3 SERPL-MCNC: 28.2 NG/ML (ref 30–100)
ALBUMIN SERPL-MCNC: 3.8 G/DL (ref 3.5–5.2)
ALBUMIN/GLOB SERPL: 1.2 G/DL
ALP SERPL-CCNC: 68 U/L (ref 39–117)
ALT SERPL W P-5'-P-CCNC: 14 U/L (ref 1–33)
ANION GAP SERPL CALCULATED.3IONS-SCNC: 12 MMOL/L (ref 5–15)
AST SERPL-CCNC: 13 U/L (ref 1–32)
BACTERIA UR QL AUTO: ABNORMAL /HPF
BACTERIA UR QL AUTO: ABNORMAL /HPF
BASOPHILS # BLD AUTO: 0.06 10*3/MM3 (ref 0–0.2)
BASOPHILS NFR BLD AUTO: 0.9 % (ref 0–1.5)
BILIRUB SERPL-MCNC: <0.2 MG/DL (ref 0–1.2)
BILIRUB UR QL STRIP: NEGATIVE
BILIRUB UR QL STRIP: NEGATIVE
BUN SERPL-MCNC: 12 MG/DL (ref 6–20)
BUN/CREAT SERPL: 13.3 (ref 7–25)
CALCIUM SPEC-SCNC: 8.5 MG/DL (ref 8.6–10.5)
CHLORIDE SERPL-SCNC: 104 MMOL/L (ref 98–107)
CHOLEST SERPL-MCNC: 204 MG/DL (ref 0–200)
CLARITY UR: CLEAR
CLARITY UR: CLEAR
CO2 SERPL-SCNC: 23 MMOL/L (ref 22–29)
COLOR UR: YELLOW
COLOR UR: YELLOW
CREAT SERPL-MCNC: 0.9 MG/DL (ref 0.57–1)
DEPRECATED RDW RBC AUTO: 34.8 FL (ref 37–54)
EGFRCR SERPLBLD CKD-EPI 2021: 81.5 ML/MIN/1.73
EOSINOPHIL # BLD AUTO: 0.25 10*3/MM3 (ref 0–0.4)
EOSINOPHIL NFR BLD AUTO: 3.8 % (ref 0.3–6.2)
ERYTHROCYTE [DISTWIDTH] IN BLOOD BY AUTOMATED COUNT: 11.4 % (ref 12.3–15.4)
FERRITIN SERPL-MCNC: 82.2 NG/ML (ref 13–150)
FOLATE SERPL-MCNC: 8.94 NG/ML (ref 4.78–24.2)
GLOBULIN UR ELPH-MCNC: 3.1 GM/DL
GLUCOSE SERPL-MCNC: 129 MG/DL (ref 65–99)
GLUCOSE UR STRIP-MCNC: NEGATIVE MG/DL
GLUCOSE UR STRIP-MCNC: NEGATIVE MG/DL
HBA1C MFR BLD: 5.1 % (ref 4.8–5.6)
HCT VFR BLD AUTO: 37.5 % (ref 34–46.6)
HDLC SERPL-MCNC: 38 MG/DL (ref 40–60)
HGB BLD-MCNC: 11.6 G/DL (ref 12–15.9)
HGB UR QL STRIP.AUTO: NEGATIVE
HGB UR QL STRIP.AUTO: NEGATIVE
HOLD SPECIMEN: NORMAL
HYALINE CASTS UR QL AUTO: ABNORMAL /LPF
HYALINE CASTS UR QL AUTO: ABNORMAL /LPF
IMM GRANULOCYTES # BLD AUTO: 0.02 10*3/MM3 (ref 0–0.05)
IMM GRANULOCYTES NFR BLD AUTO: 0.3 % (ref 0–0.5)
IRON 24H UR-MRATE: 48 MCG/DL (ref 37–145)
KETONES UR QL STRIP: ABNORMAL
KETONES UR QL STRIP: ABNORMAL
LDLC SERPL CALC-MCNC: 95 MG/DL (ref 0–100)
LDLC/HDLC SERPL: 2.12 {RATIO}
LEUKOCYTE ESTERASE UR QL STRIP.AUTO: NEGATIVE
LEUKOCYTE ESTERASE UR QL STRIP.AUTO: NEGATIVE
LYMPHOCYTES # BLD AUTO: 2.4 10*3/MM3 (ref 0.7–3.1)
LYMPHOCYTES NFR BLD AUTO: 36.7 % (ref 19.6–45.3)
MCH RBC QN AUTO: 26.2 PG (ref 26.6–33)
MCHC RBC AUTO-ENTMCNC: 30.9 G/DL (ref 31.5–35.7)
MCV RBC AUTO: 84.7 FL (ref 79–97)
MONOCYTES # BLD AUTO: 0.55 10*3/MM3 (ref 0.1–0.9)
MONOCYTES NFR BLD AUTO: 8.4 % (ref 5–12)
NEUTROPHILS NFR BLD AUTO: 3.26 10*3/MM3 (ref 1.7–7)
NEUTROPHILS NFR BLD AUTO: 49.9 % (ref 42.7–76)
NITRITE UR QL STRIP: NEGATIVE
NITRITE UR QL STRIP: NEGATIVE
NRBC BLD AUTO-RTO: 0 /100 WBC (ref 0–0.2)
PH UR STRIP.AUTO: 6 [PH] (ref 5–8)
PH UR STRIP.AUTO: 6 [PH] (ref 5–8)
PLATELET # BLD AUTO: 343 10*3/MM3 (ref 140–450)
PMV BLD AUTO: 11.9 FL (ref 6–12)
POTASSIUM SERPL-SCNC: 3.7 MMOL/L (ref 3.5–5.2)
PREALB SERPL-MCNC: 29.6 MG/DL (ref 20–40)
PROT SERPL-MCNC: 6.9 G/DL (ref 6–8.5)
PROT UR QL STRIP: ABNORMAL
PROT UR QL STRIP: ABNORMAL
RBC # BLD AUTO: 4.43 10*6/MM3 (ref 3.77–5.28)
RBC # UR STRIP: ABNORMAL /HPF
RBC # UR STRIP: ABNORMAL /HPF
REF LAB TEST METHOD: ABNORMAL
REF LAB TEST METHOD: ABNORMAL
SODIUM SERPL-SCNC: 139 MMOL/L (ref 136–145)
SP GR UR STRIP: 1.03 (ref 1–1.03)
SP GR UR STRIP: 1.03 (ref 1–1.03)
SQUAMOUS #/AREA URNS HPF: ABNORMAL /HPF
SQUAMOUS #/AREA URNS HPF: ABNORMAL /HPF
TRIGL SERPL-MCNC: 427 MG/DL (ref 0–150)
UROBILINOGEN UR QL STRIP: ABNORMAL
UROBILINOGEN UR QL STRIP: ABNORMAL
VLDLC SERPL-MCNC: 71 MG/DL (ref 5–40)
WBC # UR STRIP: ABNORMAL /HPF
WBC # UR STRIP: ABNORMAL /HPF
WBC NRBC COR # BLD AUTO: 6.54 10*3/MM3 (ref 3.4–10.8)

## 2024-01-17 PROCEDURE — 82746 ASSAY OF FOLIC ACID SERUM: CPT

## 2024-01-17 PROCEDURE — 83540 ASSAY OF IRON: CPT

## 2024-01-17 PROCEDURE — 83921 ORGANIC ACID SINGLE QUANT: CPT

## 2024-01-17 PROCEDURE — 84443 ASSAY THYROID STIM HORMONE: CPT

## 2024-01-17 PROCEDURE — 84134 ASSAY OF PREALBUMIN: CPT

## 2024-01-17 PROCEDURE — 81001 URINALYSIS AUTO W/SCOPE: CPT

## 2024-01-17 PROCEDURE — 85025 COMPLETE CBC W/AUTO DIFF WBC: CPT

## 2024-01-17 PROCEDURE — 82306 VITAMIN D 25 HYDROXY: CPT

## 2024-01-17 PROCEDURE — 80053 COMPREHEN METABOLIC PANEL: CPT

## 2024-01-17 PROCEDURE — 36415 COLL VENOUS BLD VENIPUNCTURE: CPT

## 2024-01-17 PROCEDURE — 84425 ASSAY OF VITAMIN B-1: CPT

## 2024-01-17 PROCEDURE — 80061 LIPID PANEL: CPT

## 2024-01-17 PROCEDURE — 82728 ASSAY OF FERRITIN: CPT

## 2024-01-17 PROCEDURE — 83036 HEMOGLOBIN GLYCOSYLATED A1C: CPT

## 2024-01-18 ENCOUNTER — TELEPHONE (OUTPATIENT)
Dept: BARIATRICS/WEIGHT MGMT | Facility: CLINIC | Age: 44
End: 2024-01-18
Payer: COMMERCIAL

## 2024-01-18 LAB — TSH SERPL DL<=0.005 MIU/L-ACNC: 1.62 UIU/ML (ref 0.45–4.5)

## 2024-01-18 RX ORDER — ERGOCALCIFEROL 1.25 MG/1
50000 CAPSULE ORAL
Qty: 12 CAPSULE | Refills: 0 | Status: SHIPPED | OUTPATIENT
Start: 2024-01-18 | End: 2024-04-05

## 2024-01-18 NOTE — TELEPHONE ENCOUNTER
Patient called concerned about lab results specifically about CBC and urinalysis values. After talking to Katherine Her, patient was advised to take multivitamin with iron consistently, she reported to be taking it but irregularly. Patient's hemoglobin is low (11.6) but it's been consistently low at least for the past 2 years, reason why Dr. Her thinks it's decreasing her MCH, MCHC and RDW, patient was advised to follow up with PCP regarding this issue since she's been dealing with this issue for over 2 years, and according to patient nobody has ever addressed this in the past. Iron, ferritin and prealbumin levels are normal. Regarding urinalysis, this was ordered by her PCP, so patient was advised to follow up with him as well. Patient understood everything and will follow instructions.

## 2024-01-22 LAB — METHYLMALONATE SERPL-SCNC: 194 NMOL/L (ref 0–378)

## 2024-01-23 LAB — VIT B1 BLD-SCNC: 75.3 NMOL/L (ref 66.5–200)

## 2024-02-02 ENCOUNTER — OFFICE VISIT (OUTPATIENT)
Dept: BARIATRICS/WEIGHT MGMT | Facility: CLINIC | Age: 44
End: 2024-02-02
Payer: COMMERCIAL

## 2024-02-02 VITALS
WEIGHT: 293 LBS | HEART RATE: 87 BPM | BODY MASS INDEX: 53.92 KG/M2 | TEMPERATURE: 97.7 F | DIASTOLIC BLOOD PRESSURE: 102 MMHG | SYSTOLIC BLOOD PRESSURE: 148 MMHG | HEIGHT: 62 IN

## 2024-02-02 DIAGNOSIS — E66.01 CLASS 3 SEVERE OBESITY DUE TO EXCESS CALORIES WITH SERIOUS COMORBIDITY AND BODY MASS INDEX (BMI) OF 50.0 TO 59.9 IN ADULT: Primary | ICD-10-CM

## 2024-02-02 DIAGNOSIS — Z98.84 S/P LAPAROSCOPIC SLEEVE GASTRECTOMY: ICD-10-CM

## 2024-02-02 DIAGNOSIS — G47.33 OSA (OBSTRUCTIVE SLEEP APNEA): ICD-10-CM

## 2024-02-02 DIAGNOSIS — R53.82 CHRONIC FATIGUE: ICD-10-CM

## 2024-02-02 DIAGNOSIS — I10 ESSENTIAL HYPERTENSION: ICD-10-CM

## 2024-02-02 DIAGNOSIS — Z71.3 DIETARY COUNSELING: ICD-10-CM

## 2024-02-02 PROCEDURE — 3080F DIAST BP >= 90 MM HG: CPT | Performed by: SURGERY

## 2024-02-02 PROCEDURE — 1159F MED LIST DOCD IN RCRD: CPT | Performed by: SURGERY

## 2024-02-02 PROCEDURE — 3077F SYST BP >= 140 MM HG: CPT | Performed by: SURGERY

## 2024-02-02 PROCEDURE — 99214 OFFICE O/P EST MOD 30 MIN: CPT | Performed by: SURGERY

## 2024-02-02 PROCEDURE — 1160F RVW MEDS BY RX/DR IN RCRD: CPT | Performed by: SURGERY

## 2024-02-02 NOTE — PROGRESS NOTES
[facility] BARIATR[facility] BARIATRIC SURGERY  [address] [facility] [address]  [facility] [address]  [phone]  [address] [facility] [address]  [facility] [id number] [phone]  Dept: [phone]  2/2/2024      Yancy Hernandez.  [id number]  [id number]  1980  female      Chief Complaint   Patient presents with    Follow-up     1 year follow up sleeve        BH Post-Op Bariatric Surgery:   Yancy Hernandez is status post Laparoscopic gastric sleeve w/ PHR procedure, performed on 12/21/22     HPI:   Today's weight is (!) 144 kg (317 lb) pounds, today's BMI is Body mass index is 57.97 kg/m²., a  gain of 9 pounds since the last visit and weight loss since surgery is 35 pounds.    Yancy Hernandez denies nausea vomiting fever chills chest pain shortness of air abdominal pain and reports getting off track with her diet.  She went through her daily routine and states she has cut back on her almond alejandro and she does in the morning when she takes her medications.  She then is eating a  salad for lunch that she picks up at a gas station with Czech dressing which she will change.  She then eats a fairly healthy dinner.  Her schedule is erratic with her job.     Diet and Exercise: Diet history reviewed and discussed with the patient. Weight loss/gains to date discussed with the patient. The patient states they are eating around over 70 grams of protein per day. She reports eating 2 meals per day, a typical portion size of 1 cup, eating 2-3 snacks per day, drinking 4 or more 8-oz. glasses of water per day, no carbonated beverage consumption and exercising regularly.     Supplements: Yes.     Review of Systems   Constitutional:  Positive for fatigue.   Musculoskeletal:  Positive for arthralgias.   All other systems reviewed and are negative.        * No active hospital problems. *      Past Medical History:   Diagnosis Date    Arthritis     Carpal tunnel syndrome     Depression     HX    GERD  (gastroesophageal reflux disease)     Helicobacter pylori infection 11/27/2015    Hiatal hernia     History of anemia     History of blood clots     LT LEG, COUPLE YEARS AGO, TOOK ELIQUIS    History of COVID-19 2020    Hyperlipidemia     Hypertension     Knee pain, bilateral     Migraine     OAB (overactive bladder)     Obesity     Sleep apnea     CPAP       The following portions of the patient's history were reviewed and updated as appropriate: allergies, current medications, past family history, past medical history, past social history, past surgical history, and problem list.    Vitals:    02/02/24 1016   BP: (!) 148/102   Pulse: 87   Temp: 97.7 °F (36.5 °C)       Physical Exam  Constitutional:       Appearance: Normal appearance.   HENT:      Head: Normocephalic and atraumatic.   Eyes:      Conjunctiva/sclera: Conjunctivae normal.   Pulmonary:      Effort: Pulmonary effort is normal.   Neurological:      Mental Status: She is alert and oriented to person, place, and time.   Psychiatric:         Mood and Affect: Mood normal.         Behavior: Behavior normal.         Thought Content: Thought content normal.         Judgment: Judgment normal.         Assessment:   Post-op, the patient status post laparoscopic sleeve gastrectomy and paraesophageal hernia repair December 2022 who was 13 months out.  Patient has gotten off track with her diet.  We again had a long discussion regarding clean eating concentrating on lean protein and vegetables and staying off the poor quality foods that she eats at times.  She is still drinking a Snapple but is cut back on that as well.  I instructed her not to drink any calories and lifted some the replacement shake.  We again discussed doing that to get a jumpstart on her diet which she states that she would like to do.  I instructed her that she needs to make sure she is not buying any of that poor quality food to have in the house and to concentrate on meals only.  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  She states that the  has helped her with exercises.  She would like to lose over 25 pounds in the next 2 months at her follow-up visit.  She states she is going to do the shakes to get a jumpstart.  Blood pressure was elevated today and she will continue with her blood pressure medication and continue to follow.  Labs were reviewed..     Encounter Diagnoses   Name Primary?    Class 3 severe obesity due to excess calories with serious comorbidity and body mass index (BMI) of 50.0 to 59.9 in adult Yes    S/P laparoscopic sleeve gastrectomy     Dietary counseling     Chronic fatigue     HORTENCIA (obstructive sleep apnea)     Essential hypertension        Plan:     Encouraged patient to be sure to get plenty of lean protein per day through small meals all with a protein source.   Activity restrictions: none.   Recommended patient be sure to get at least 70 grams of protein per day by eating small  meals all with high lean protein choices. Be sure to limit/cut back on daily carbohydrate intake. Discussed with the patient the recommended amount of water per day to intake- half of body weight in ounces. Reviewed vitamin requirements. Be sure to do routine exercise, 150 minutes per week minimum, including both cardio and strength training.     Instructions / Recommendations: dietary counseling recommended, recommended a daily protein intake of  grams, vitamin supplement(s) recommended, recommended exercising at least 150 minutes per week, behavior modifications recommended and instructed to call the office for concerns, questions, or problems.     The patient was instructed to follow up in 2 months.     The patient was counseled regarding the above procedure. Total time spent on this encounter was  35 minutes including reviewing previous notes, lab results and face to face time spent with the patient.  The majority of time  was spent counseling the patient regarding diet and exercise as well as reviewing their medications and their compliance with the procedure.

## 2024-02-27 ENCOUNTER — OFFICE VISIT (OUTPATIENT)
Dept: OBSTETRICS AND GYNECOLOGY | Age: 44
End: 2024-02-27
Payer: COMMERCIAL

## 2024-02-27 VITALS
WEIGHT: 293 LBS | DIASTOLIC BLOOD PRESSURE: 90 MMHG | BODY MASS INDEX: 53.92 KG/M2 | HEIGHT: 62 IN | SYSTOLIC BLOOD PRESSURE: 142 MMHG

## 2024-02-27 DIAGNOSIS — L72.0 EPIDERMAL INCLUSION CYST: ICD-10-CM

## 2024-02-27 DIAGNOSIS — R63.8 INCREASED BMI: ICD-10-CM

## 2024-02-27 DIAGNOSIS — N92.6 IRREGULAR MENSES: ICD-10-CM

## 2024-02-27 DIAGNOSIS — E66.01 CLASS 3 SEVERE OBESITY DUE TO EXCESS CALORIES WITH SERIOUS COMORBIDITY AND BODY MASS INDEX (BMI) OF 50.0 TO 59.9 IN ADULT: Primary | ICD-10-CM

## 2024-02-27 PROBLEM — R33.9 URINARY RETENTION: Status: RESOLVED | Noted: 2023-01-23 | Resolved: 2024-02-27

## 2024-02-27 RX ORDER — MEDROXYPROGESTERONE ACETATE 5 MG/1
5 TABLET ORAL DAILY
Qty: 30 TABLET | Refills: 4 | Status: SHIPPED | OUTPATIENT
Start: 2024-02-27

## 2024-02-27 RX ORDER — TRAZODONE HYDROCHLORIDE 50 MG/1
TABLET ORAL
COMMUNITY
Start: 2024-01-18

## 2024-02-27 NOTE — PROGRESS NOTES
Subjective       History of Present Illness  Yancy Hernandez is a 44 y.o. female is being seen today for further evaluation of an area of concern on her right labia  Chief Complaint   Patient presents with    Follow-up     Gyn F/u - f/u vaginal cyst on labia, pt stating cyst has gotten a bit bigger & harder   .        The following portions of the patient's history were reviewed and updated as appropriate: allergies, current medications, past family history, past medical history, past social history, past surgical history and problem list.    PAST MEDICAL HISTORY  Past Medical History:   Diagnosis Date    Arthritis     Carpal tunnel syndrome     Depression     HX    Elevated triglycerides with high cholesterol     GERD (gastroesophageal reflux disease)     Helicobacter pylori infection 2015    Hiatal hernia     History of anemia     History of blood clots     LT LEG, COUPLE YEARS AGO, TOOK ELIQUIS    History of COVID-19     Hyperlipidemia     Hypertension     Knee pain, bilateral     Migraine     OAB (overactive bladder)     Obesity     Sleep apnea     CPAP     OB History    Para Term  AB Living   8 8 8     8   SAB IAB Ectopic Molar Multiple Live Births             8      # Outcome Date GA Lbr Kevin/2nd Weight Sex Delivery Anes PTL Lv   8 Term    2892 g (6 lb 6 oz) M Vag-Spont   JAMIN   7 Term    3657 g (8 lb 1 oz) F Vag-Spont   JAMIN   6 Term    3884 g (8 lb 9 oz) M Vag-Spont   JAMIN   5 Term    3657 g (8 lb 1 oz) F Vag-Spont   JAMIN   4 Term    3771 g (8 lb 5 oz) M Vag-Spont   JAMIN   3 Term    3515 g (7 lb 12 oz) M Vag-Spont   JAMIN   2 Term    3345 g (7 lb 6 oz) F Vag-Spont   JAMIN   1 Term    2722 g (6 lb) M Vag-Spont   JAMIN     Past Surgical History:   Procedure Laterality Date    BILATERAL BREAST REDUCTION      CARPAL TUNNEL RELEASE Left     DENTAL PROCEDURE      DILATATION AND CURETTAGE      ENDOMETRIAL ABLATION      ENDOSCOPY N/A 10/18/2022    Procedure:  ESOPHAGOGASTRODUODENOSCOPY WITH BIOPSY;  Surgeon: Kevin Fuentes Jr., MD;  Location:  [facility];  Service: General;  Laterality: N/A;  PRE- GERD  POST- GASTRITIS    GASTRIC SLEEVE LAPAROSCOPIC N/A 12/21/2022    Procedure: GASTRIC SLEEVE LAPAROSCOPIC WITH ROBOTIC, PARAESPHAGEAL HERRNIA REPAIR, LYSIS OF ADHESIONS;  Surgeon: Kevin Fuentes Jr., MD;  Location:  [facility];  Service: Robotics - DaVinci;  Laterality: N/A;    LAPAROSCOPIC CHOLECYSTECTOMY      TONSILLECTOMY      TUBAL ABDOMINAL LIGATION       Family History   Problem Relation Age of Onset    Obesity Mother     Hypertension Mother     Hypertension Father     Sleep apnea Father     Sleep apnea Sister     Hypertension Sister     Malig Hyperthermia Neg Hx      Social History     Tobacco Use   Smoking Status Never   Smokeless Tobacco Never       Current Outpatient Medications:     benazepril-hydrochlorthiazide (LOTENSIN HCT) 20-12.5 MG per tablet, , Disp: , Rfl:     famotidine (PEPCID) 20 MG tablet, Take 1 tablet by mouth 2 (Two) Times a Day., Disp: , Rfl:     medroxyPROGESTERone (Provera) 5 MG tablet, Take 1 tablet by mouth Daily. First 10 days of each month, Disp: 30 tablet, Rfl: 4    multivitamin with minerals tablet tablet, Take 1 tablet by mouth Daily., Disp: , Rfl:     traZODone (DESYREL) 50 MG tablet, TAKE 1/2 TO 1 TABLET BY MOUTH EVERY NIGHT AT BEDTIME AS NEEDED, Disp: , Rfl:     vitamin D (ERGOCALCIFEROL) 1.25 MG (87994 UT) capsule capsule, Take 1 capsule by mouth Every 7 (Seven) Days for 12 doses., Disp: 12 capsule, Rfl: 0  Immunization History   Administered Date(s) Administered    COVID-19 (MODERNA) 1st,2nd,3rd Dose Monovalent 06/18/2021       Review of Systems       Except as outlined in history of physical illness, patient denies any changes in her GYN, , GI systems. All other systems reviewed are negative.    Objective   Physical Exam   Alert and oriented, respirations unlabored, heart regular rate and rhythm   Pelvic extra  genitalia normal female BUS normal, there is a very small 2 mm epidermal inclusion cyst on the inside of her right labia minora.  It is unchanged from the previous exam 3 months ago.  The remainder of the exam is normal.      Assessment & Plan   Diagnoses and all orders for this visit:    1. Class 3 severe obesity due to excess calories with serious comorbidity and body mass index (BMI) of 50.0 to 59.9 in adult (Primary)    2. Irregular menses  She has not been taking the Provera I have discussed the importance of making sure she does that in a regular basis.  3. Increased BMI    4. Epidermal inclusion cyst  Patient thought there might be some change in this finding but on my exam it is stable or maybe even smaller than previously described last visit.  It is a benign epidermal inclusion cyst treatment options were reviewed will simply review patient is due for a Pap smear in the next month or 2 and those appointments are scheduled.  Other orders  -     medroxyPROGESTERone (Provera) 5 MG tablet; Take 1 tablet by mouth Daily. First 10 days of each month  Dispense: 30 tablet; Refill: 4                 No orders of the defined types were placed in this encounter.          EMR Dragon/ Transcription disclaimer:  Much of the encounter note is an electronic transcription/translation of spoken language to printed text. The electronic translation of spoken language may permit erroneous, or at times, nonessential words or phrases to be inadvertently transcribes; Although i have reviewed the note for such errors, some may still exist.

## 2024-03-05 ENCOUNTER — TRANSCRIBE ORDERS (OUTPATIENT)
Dept: SLEEP MEDICINE | Facility: HOSPITAL | Age: 44
End: 2024-03-05
Payer: COMMERCIAL

## 2024-03-05 DIAGNOSIS — G47.33 OBSTRUCTIVE SLEEP APNEA (ADULT) (PEDIATRIC): Primary | ICD-10-CM

## 2024-03-12 ENCOUNTER — HOSPITAL ENCOUNTER (OUTPATIENT)
Dept: SLEEP MEDICINE | Facility: HOSPITAL | Age: 44
End: 2024-03-12
Payer: COMMERCIAL

## 2024-03-12 DIAGNOSIS — G47.33 OBSTRUCTIVE SLEEP APNEA (ADULT) (PEDIATRIC): ICD-10-CM

## 2024-03-12 PROCEDURE — 95810 POLYSOM 6/> YRS 4/> PARAM: CPT

## 2024-03-18 ENCOUNTER — TELEPHONE (OUTPATIENT)
Dept: SLEEP MEDICINE | Facility: HOSPITAL | Age: 44
End: 2024-03-18
Payer: COMMERCIAL

## 2024-03-18 PROCEDURE — 95810 POLYSOM 6/> YRS 4/> PARAM: CPT | Performed by: INTERNAL MEDICINE

## 2024-04-19 RX ORDER — ERGOCALCIFEROL 1.25 MG/1
50000 CAPSULE ORAL
Qty: 12 CAPSULE | Refills: 0 | OUTPATIENT
Start: 2024-04-19

## 2024-05-28 ENCOUNTER — OFFICE VISIT (OUTPATIENT)
Dept: OBSTETRICS AND GYNECOLOGY | Age: 44
End: 2024-05-28
Payer: COMMERCIAL

## 2024-05-28 VITALS — HEIGHT: 62 IN | BODY MASS INDEX: 53.92 KG/M2 | WEIGHT: 293 LBS

## 2024-05-28 DIAGNOSIS — N89.8 VAGINAL DISCHARGE: ICD-10-CM

## 2024-05-28 DIAGNOSIS — Z01.419 WELL FEMALE EXAM WITH ROUTINE GYNECOLOGICAL EXAM: Primary | ICD-10-CM

## 2024-05-28 DIAGNOSIS — Z12.31 SCREENING MAMMOGRAM FOR BREAST CANCER: ICD-10-CM

## 2024-05-28 DIAGNOSIS — Z12.4 SCREENING FOR MALIGNANT NEOPLASM OF CERVIX: ICD-10-CM

## 2024-05-28 DIAGNOSIS — N89.8 VAGINAL ODOR: ICD-10-CM

## 2024-05-28 DIAGNOSIS — Z11.51 SCREENING FOR HUMAN PAPILLOMAVIRUS (HPV): ICD-10-CM

## 2024-05-28 PROCEDURE — 99396 PREV VISIT EST AGE 40-64: CPT

## 2024-05-28 PROCEDURE — 99214 OFFICE O/P EST MOD 30 MIN: CPT

## 2024-05-28 PROCEDURE — 1159F MED LIST DOCD IN RCRD: CPT

## 2024-05-28 PROCEDURE — 1160F RVW MEDS BY RX/DR IN RCRD: CPT

## 2024-05-28 RX ORDER — FLUCONAZOLE 150 MG/1
TABLET ORAL
COMMUNITY
Start: 2024-03-19 | End: 2024-05-28

## 2024-05-28 RX ORDER — METRONIDAZOLE 500 MG/1
500 TABLET ORAL 2 TIMES DAILY
Qty: 14 TABLET | Refills: 0 | Status: SHIPPED | OUTPATIENT
Start: 2024-05-28 | End: 2024-06-04

## 2024-05-28 RX ORDER — FENOFIBRATE 48 MG/1
48 TABLET, COATED ORAL DAILY
COMMUNITY

## 2024-05-28 NOTE — PROGRESS NOTES
Subjective     Chief Complaint   Patient presents with    Gynecologic Exam     Annual exam, last pap 3/21/23 (-), last mammo 23 (-), no complaints       History of Present Illness    Yancy Hernandez is a 44 y.o.  who presents for annual exam.  Has had a uterine ablation gets occasional spotting  On provera admits to not taking monthly  Has an ongoing vulvar cyst does not hurt  Has had ongoing vaginal odor and discharge  Triglycerides are elevated she started a new med  Just got back from [place] her in laws live there  Trying an otc boric acid wash  She is going to the gym    Obstetric History:  OB History          9    Para   8    Term   8            AB   1    Living   8         SAB        IAB        Ectopic        Molar        Multiple        Live Births   8               Menstrual History:     No LMP recorded (lmp unknown). Patient has had an ablation.         Current contraception: tubal ligation and ablation  History of abnormal Pap smear: no  Received Gardasil immunization: no  Perform regular self breast exam: yes - sometimes  Family history of uterine or ovarian cancer: no  Family History of colon cancer: no  Family history of breast cancer: no    Mammogram: ordered.  Colonoscopy: not indicated.  DEXA: not indicated.    Exercise: started the gym  Calcium/Vitamin D: adequate intake and uses supplements    The following portions of the patient's history were reviewed and updated as appropriate: allergies, current medications, past family history, past medical history, past social history, past surgical history, and problem list.    Review of Systems   Constitutional: Negative.    HENT: Negative.     Eyes: Negative.    Respiratory: Negative.     Cardiovascular: Negative.    Gastrointestinal: Negative.    Endocrine: Negative.    Genitourinary:  Positive for vaginal discharge.        Vaginal odor   Musculoskeletal: Negative.    Skin: Negative.    Allergic/Immunologic: Negative.   "  Neurological: Negative.    Hematological: Negative.    Psychiatric/Behavioral: Negative.             Objective   Physical Exam  Vitals and nursing note reviewed.   Constitutional:       Appearance: Normal appearance.   HENT:      Head: Normocephalic.   Eyes:      Pupils: Pupils are equal, round, and reactive to light.   Cardiovascular:      Rate and Rhythm: Normal rate and regular rhythm.      Pulses: Normal pulses.      Heart sounds: Normal heart sounds.   Pulmonary:      Effort: Pulmonary effort is normal.      Breath sounds: Normal breath sounds.   Chest:   Breasts:     Right: Normal.      Left: Normal.   Abdominal:      General: Abdomen is flat. Bowel sounds are normal.      Palpations: Abdomen is soft.   Genitourinary:     General: Normal vulva.      Exam position: Lithotomy position.      Vagina: Normal. Vaginal discharge present.      Cervix: Normal.      Uterus: Normal.       Adnexa: Right adnexa normal and left adnexa normal.      Rectum: Normal.   Musculoskeletal:         General: Normal range of motion.      Cervical back: Full passive range of motion without pain and normal range of motion.      Right lower leg: No edema.      Left lower leg: No edema.   Lymphadenopathy:      Head:      Right side of head: No submental or submandibular adenopathy.      Left side of head: No submental or submandibular adenopathy.   Skin:     General: Skin is warm and dry.   Neurological:      General: No focal deficit present.      Mental Status: She is alert.      Gait: Gait is intact.   Psychiatric:         Attention and Perception: Attention normal.         Mood and Affect: Mood normal.         Speech: Speech normal.         Ht 157.5 cm (62.01\")   Wt (!) 141 kg (311 lb)   LMP  (LMP Unknown)   BMI 56.86 kg/m²     Assessment & Plan   Diagnoses and all orders for this visit:    1. Well female exam with routine gynecological exam (Primary)  -     IGP, Apt HPV,rfx 16 / 18,45    2. Screening for human papillomavirus " (HPV)  -     IGP, Apt HPV,rfx 16 / 18,45    3. Screening for malignant neoplasm of cervix  -     IGP, Apt HPV,rfx 16 / 18,45    4. Screening mammogram for breast cancer  -     Mammo Screening Digital Tomosynthesis Bilateral With CAD; Future    5. Vaginal discharge  -     NuSwab VG+ - Swab, Vagina  -     metroNIDAZOLE (Flagyl) 500 MG tablet; Take 1 tablet by mouth 2 (Two) Times a Day for 7 days.  Dispense: 14 tablet; Refill: 0    6. Vaginal odor  -     NuSwab VG+ - Swab, Vagina  -     metroNIDAZOLE (Flagyl) 500 MG tablet; Take 1 tablet by mouth 2 (Two) Times a Day for 7 days.  Dispense: 14 tablet; Refill: 0      Pap today guidelines   Nuswab to r/o infection   Suspect bv treated today  Take provera as prescribed discussed implications   All questions answered and addressed  Breast self exam technique reviewed and patient encouraged to perform self-exam monthly.  Discussed healthy lifestyle modifications.  Recommended 30 minutes of aerobic exercise five times per week.  Continue to work on diet   Discussed vit d and calcium needs to prevent osteoporosis.  Call the office if you have not received results from today's visit within 2 weeks  I spent 30 minutes caring for Yancy Hernandez on this date of service. This time includes time spent by me in the following activities: preparing for the visit, reviewing tests, obtaining and/or reviewing a separately obtained history, performing a medically appropriate examination and/or evaluation, counseling and educating the patient/family/caregiver, ordering medications, tests, or procedures and documenting information in the medical record.             Anastacia Joyce, APRN  5/28/2024, 15:52 EDT

## 2024-05-30 LAB
A VAGINAE DNA VAG QL NAA+PROBE: NORMAL SCORE
BVAB2 DNA VAG QL NAA+PROBE: NORMAL SCORE
C ALBICANS DNA VAG QL NAA+PROBE: NEGATIVE
C GLABRATA DNA VAG QL NAA+PROBE: NEGATIVE
C TRACH DNA VAG QL NAA+PROBE: NEGATIVE
MEGA1 DNA VAG QL NAA+PROBE: NORMAL SCORE
N GONORRHOEA DNA VAG QL NAA+PROBE: NEGATIVE
T VAGINALIS DNA VAG QL NAA+PROBE: NEGATIVE

## 2024-05-31 LAB
CYTOLOGIST CVX/VAG CYTO: NORMAL
CYTOLOGY CVX/VAG DOC CYTO: NORMAL
CYTOLOGY CVX/VAG DOC THIN PREP: NORMAL
DX ICD CODE: NORMAL
HPV I/H RISK 4 DNA CVX QL PROBE+SIG AMP: NEGATIVE
Lab: NORMAL
OTHER STN SPEC: NORMAL
STAT OF ADQ CVX/VAG CYTO-IMP: NORMAL

## 2024-07-18 ENCOUNTER — OFFICE VISIT (OUTPATIENT)
Dept: BARIATRICS/WEIGHT MGMT | Facility: CLINIC | Age: 44
End: 2024-07-18
Payer: COMMERCIAL

## 2024-07-18 VITALS
BODY MASS INDEX: 53.92 KG/M2 | SYSTOLIC BLOOD PRESSURE: 141 MMHG | TEMPERATURE: 98.2 F | HEART RATE: 72 BPM | WEIGHT: 293 LBS | HEIGHT: 62 IN | DIASTOLIC BLOOD PRESSURE: 87 MMHG

## 2024-07-18 DIAGNOSIS — Z86.19 HISTORY OF HELICOBACTER PYLORI INFECTION: ICD-10-CM

## 2024-07-18 DIAGNOSIS — E66.01 CLASS 3 SEVERE OBESITY DUE TO EXCESS CALORIES WITH SERIOUS COMORBIDITY AND BODY MASS INDEX (BMI) OF 50.0 TO 59.9 IN ADULT: Primary | ICD-10-CM

## 2024-07-18 DIAGNOSIS — I10 ESSENTIAL HYPERTENSION: ICD-10-CM

## 2024-07-18 DIAGNOSIS — Z98.84 S/P LAPAROSCOPIC SLEEVE GASTRECTOMY: ICD-10-CM

## 2024-07-18 RX ORDER — BENAZEPRIL/HYDROCHLOROTHIAZIDE 20 MG-25MG
TABLET ORAL
COMMUNITY
Start: 2024-07-02

## 2024-07-18 NOTE — PROGRESS NOTES
[facility] BARIATRIC SURGERY  [address]  [facility] [id number] [phone]  [address]  [facility] [address] [phone]  Dept: [phone]  7/18/2024      Yancy Hernandez.  [id number]  [id number]  1980  female      Chief Complaint   Patient presents with    Follow-up     Follow up sleeve       BH Post-Op Bariatric Surgery:   Yancy Hernandez is status post Laparoscopic Sleeve with PEHR procedure, performed on 12/21/2022     HPI:       Today's weight is (!) 145 kg (319 lb) pounds, today's BMI is Body mass index is 58.33 kg/m²., she has a gain of 2 pounds since the last visit and her  weight loss since surgery is 36 pounds. The patient reports a decreased portion size and loss of appetite.    Yancy Hernandez denies v/d/regurg/reflux and reports nausea, constipation, and heartburn.    Changed way she cooks foods using olive oil vs crisco and using air fryer.     Diet and Exercise: Diet history reviewed and discussed with the patient. Weight loss/gains to date discussed with the patient. The patient states they are eating 60+ grams of protein per day. She reports drinking 3-4 shakes per day and 1 meal a typical portion size of 1 cup, eating 3 snacks per day, drinking 8 or more 8-oz. glasses of water per day, no carbonated beverage consumption and exercising regularly- 3 days per week, until last week when  she hurt her knee.     7am- protein shake fusion (2 scoops) mixed with 1% milk with steel oats (1 scoop)  1/3 of jerky 4g protein  11-12- fusion shake  4-5pm- fusion shake  Dinner- chicken with mole sauce with rice and beans (1 chicken leg, 1/4c rice, 1/4c beans)  Drinks water throughout the day- with sugar free packet    Occ. Takeout- gokul chicken or pizza- only eats toppings    Supplements: centrum, black girl magic vit d.     Review of Systems   Constitutional:  Positive for appetite change. Negative for fatigue and  unexpected weight change.   HENT: Negative.     Eyes: Negative.    Respiratory: Negative.     Cardiovascular: Negative.  Negative for leg swelling.   Gastrointestinal:  Positive for constipation and nausea. Negative for abdominal distention, abdominal pain, diarrhea and vomiting.        Heartburn   Genitourinary:  Negative for difficulty urinating, frequency and urgency.   Musculoskeletal:  Negative for back pain.   Skin: Negative.    Psychiatric/Behavioral: Negative.     All other systems reviewed and are negative.      Patient Active Problem List   Diagnosis    Vitamin D deficiency    Nonalcoholic fatty liver disease    Iron deficiency anemia    Edema    Essential hypertension    Dietary counseling    HORTENCIA (obstructive sleep apnea)    History of DVT (deep vein thrombosis)    GERD (gastroesophageal reflux disease)    Multiple joint pain    Borderline hyperlipidemia    Bilateral leg edema    Urinary incontinence in female    S/P laparoscopic sleeve gastrectomy    Chronic fatigue    Primary insomnia    Class 3 severe obesity due to excess calories with serious comorbidity and body mass index (BMI) of 50.0 to 59.9 in adult    Abnormal uterine bleeding (AUB)       Past Medical History:   Diagnosis Date    Arthritis     Carpal tunnel syndrome     Depression     HX    Elevated triglycerides with high cholesterol     GERD (gastroesophageal reflux disease)     Helicobacter pylori infection 11/27/2015    Hiatal hernia     History of anemia     History of blood clots     LT LEG, COUPLE YEARS AGO, TOOK ELIQUIS    History of COVID-19 2020    Hyperlipidemia     Hypertension     Knee pain, bilateral     Migraine     OAB (overactive bladder)     Obesity     Sleep apnea     CPAP       The following portions of the patient's history were reviewed and updated as appropriate: allergies, current medications, past medical history, past surgical history, and problem list.    Vitals:    07/18/24 1416   BP: 141/87   Pulse: 72   Temp: 98.2  °F (36.8 °C)       Physical Exam  Vitals reviewed.   Constitutional:       Appearance: Normal appearance.   HENT:      Head: Normocephalic and atraumatic.   Eyes:      Pupils: Pupils are equal, round, and reactive to light.   Cardiovascular:      Rate and Rhythm: Normal rate.   Pulmonary:      Effort: Pulmonary effort is normal. No respiratory distress.   Musculoskeletal:         General: Normal range of motion.      Cervical back: Normal range of motion and neck supple.   Neurological:      General: No focal deficit present.      Mental Status: She is alert and oriented to person, place, and time.   Psychiatric:         Mood and Affect: Mood normal.         Behavior: Behavior normal.         Assessment:   Post-op, the patient is struggling with weight stale.     Encounter Diagnoses   Name Primary?    Class 3 severe obesity due to excess calories with serious comorbidity and body mass index (BMI) of 50.0 to 59.9 in adult Yes    S/P laparoscopic sleeve gastrectomy     Essential hypertension     History of Helicobacter pylori infection        Plan:   Had a long discussion with the pt regarding weight loss medications.  Advised pt to food journal. Keep carbs <100g per day. Advised pt to stop adding oats to shakes.  Will get updated labs and EKG.  Pt concerned with previous h. Pylori bx after surgery. Will get breath test to assess for.    Encouraged patient to be sure to get plenty of lean protein per day through small frequent meals all with a protein source.   Activity restrictions: none.   Recommended patient be sure to get at least 70 grams of protein per day by eating small, frequent meals all with high lean protein choices. Be sure to limit/cut back on daily carbohydrate intake. Discussed with the patient the recommended amount of water per day to intake. Reviewed vitamin requirements. Be sure to do routine exercise consistently throughout the week, including both cardio and strength training.     Instructions /  Recommendations: dietary counseling recommended, recommended a daily protein intake of  grams, vitamin supplement(s) recommended, recommended exercising consistently throughout the week, behavior modifications recommended and instructed to call the office for concerns, questions, or problems.     The patient was instructed to follow up in 4 weeks and meet with dietitian as well .     The patient was counseled regarding nutrition. Total time spent during this encounter today was 30 minutes.

## 2024-12-10 ENCOUNTER — OFFICE VISIT (OUTPATIENT)
Dept: BARIATRICS/WEIGHT MGMT | Facility: CLINIC | Age: 44
End: 2024-12-10
Payer: COMMERCIAL

## 2024-12-10 VITALS
DIASTOLIC BLOOD PRESSURE: 94 MMHG | WEIGHT: 293 LBS | BODY MASS INDEX: 53.92 KG/M2 | HEIGHT: 62 IN | TEMPERATURE: 98.4 F | HEART RATE: 80 BPM | SYSTOLIC BLOOD PRESSURE: 150 MMHG

## 2024-12-10 DIAGNOSIS — R63.5 UNINTENDED WEIGHT GAIN: ICD-10-CM

## 2024-12-10 DIAGNOSIS — I10 ESSENTIAL HYPERTENSION: ICD-10-CM

## 2024-12-10 DIAGNOSIS — Z98.84 S/P LAPAROSCOPIC SLEEVE GASTRECTOMY: ICD-10-CM

## 2024-12-10 DIAGNOSIS — G47.33 OSA (OBSTRUCTIVE SLEEP APNEA): ICD-10-CM

## 2024-12-10 DIAGNOSIS — E66.813 CLASS 3 SEVERE OBESITY DUE TO EXCESS CALORIES WITH SERIOUS COMORBIDITY AND BODY MASS INDEX (BMI) OF 50.0 TO 59.9 IN ADULT: Primary | ICD-10-CM

## 2024-12-10 DIAGNOSIS — Z71.3 DIETARY COUNSELING: ICD-10-CM

## 2024-12-10 DIAGNOSIS — Z98.890 STATUS POST REPAIR OF PARAESOPHAGEAL DIAPHRAGMATIC HERNIA: ICD-10-CM

## 2024-12-10 DIAGNOSIS — Z87.19 STATUS POST REPAIR OF PARAESOPHAGEAL DIAPHRAGMATIC HERNIA: ICD-10-CM

## 2024-12-10 DIAGNOSIS — K21.9 GASTROESOPHAGEAL REFLUX DISEASE, UNSPECIFIED WHETHER ESOPHAGITIS PRESENT: ICD-10-CM

## 2024-12-10 DIAGNOSIS — E66.01 CLASS 3 SEVERE OBESITY DUE TO EXCESS CALORIES WITH SERIOUS COMORBIDITY AND BODY MASS INDEX (BMI) OF 50.0 TO 59.9 IN ADULT: Primary | ICD-10-CM

## 2024-12-10 DIAGNOSIS — R73.03 PRE-DIABETES: ICD-10-CM

## 2024-12-10 RX ORDER — NYSTATIN AND TRIAMCINOLONE ACETONIDE 100000; 1 [USP'U]/G; MG/G
CREAM TOPICAL
COMMUNITY

## 2024-12-10 RX ORDER — OXYBUTYNIN CHLORIDE 10 MG/1
1 TABLET, EXTENDED RELEASE ORAL DAILY
COMMUNITY
Start: 2024-11-11

## 2024-12-10 NOTE — PROGRESS NOTES
[facility] BARIATRIC SURGERY  [address]  [facility] [id number] [phone]  [address]  [facility] [id number] [phone]  Dept: [phone]  12/10/2024      Yancy Hernandez.  [id number]  [id number]  1980  female      Chief Complaint   Patient presents with    Follow-up     Fup sleeve       BH Post-Op Bariatric Surgery:   Yancy Hernandez is status post Laparoscopic gastric sleeve w/ PHR procedure, performed on 12/21/22     HPI:   Today's weight is (!) 147 kg (323 lb) pounds, today's BMI is Body mass index is 59.06 kg/m²., a  gain of 4 pounds since the last visit and weight loss since surgery is 28 pounds.    Yancy Hernandez denies nausea vomiting fever chills chest pain shortness of air abdominal pain and reports heartburn especially when eating in the car.  Patient is taking Pepcid.  Patient also with some abnormal bruising which she is following her PCP.  He thought it may be ringworm.  She is taking her bariatric multivitamin with iron     Diet and Exercise: Diet history reviewed and discussed with the patient. Weight loss/gains to date discussed with the patient. The patient states they are eating around unknown grams of protein per day. She reports eating 3 meals per day, a typical portion size of 1 cup, eating 2 snacks per day, drinking 5 or more 8-oz. glasses of water per day, no carbonated beverage consumption and exercising regularly.     Supplements: Yes bariatric.     Review of Systems   Musculoskeletal:  Positive for arthralgias.   Skin:  Positive for color change and rash.   All other systems reviewed and are negative.        * No active hospital problems. *      Past Medical History:   Diagnosis Date    Arthritis     Carpal tunnel syndrome     Depression     HX    Elevated triglycerides with high cholesterol     GERD (gastroesophageal reflux disease)     Helicobacter pylori infection 11/27/2015    Hiatal  hernia     History of anemia     History of blood clots     LT LEG, COUPLE YEARS AGO, TOOK ELIQUIS    History of COVID-19 2020    Hyperlipidemia     Hypertension     Knee pain, bilateral     Migraine     OAB (overactive bladder)     Obesity     Sleep apnea     CPAP       The following portions of the patient's history were reviewed and updated as appropriate: allergies, current medications, past family history, past medical history, past social history, past surgical history, and problem list.    Vitals:    12/10/24 1343   BP: 150/94   Pulse: 80   Temp: 98.4 °F (36.9 °C)       Physical Exam  Vitals reviewed.   HENT:      Head: Normocephalic and atraumatic.      Mouth/Throat:      Mouth: Mucous membranes are moist.      Pharynx: Oropharynx is clear.   Eyes:      General: No scleral icterus.     Extraocular Movements: Extraocular movements intact.      Conjunctiva/sclera: Conjunctivae normal.      Pupils: Pupils are equal, round, and reactive to light.   Neck:      Thyroid: No thyromegaly.   Cardiovascular:      Rate and Rhythm: Normal rate.   Pulmonary:      Effort: Pulmonary effort is normal. No respiratory distress.      Breath sounds: Normal breath sounds. No stridor. No wheezing or rhonchi.   Abdominal:      General: Bowel sounds are normal.      Palpations: Abdomen is soft.      Tenderness: There is no abdominal tenderness. There is no right CVA tenderness, left CVA tenderness, guarding or rebound.      Hernia: No hernia is present.   Musculoskeletal:         General: Normal range of motion.      Cervical back: Normal range of motion and neck supple.   Lymphadenopathy:      Cervical: No cervical adenopathy.   Skin:     General: Skin is warm and dry.      Findings: Bruising and rash present. No erythema.   Neurological:      Mental Status: She is alert and oriented to person, place, and time.   Psychiatric:         Mood and Affect: Mood normal.         Behavior: Behavior normal.         Thought Content: Thought  content normal.         Judgment: Judgment normal.         Assessment:   Post-op, the patient status post sleeve gastrectomy and paraesophageal hernia repair December 2022 who is 2 years out this month.  Patient has not done well with her weight loss and we went through her daily routine again concentrating on lean protein and vegetables and mainly concentrating on meals.  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  She is trying to eat and snack during the jobs in the car.  I instructed her to try to concentrate on meals only and not eating in the car.  This would help with some heartburn symptoms as well I think.  She would like to try medication which we discussed all the different types of medications and she would like to do the compound semaglutide.  I gave her handout on that went over all of the instructions and her consent was signed.    We did discuss GLP-1 therapy, specifically semaglutide. We discussed dosing, administration including injection site preparation with alcohol, titration, common side effects including nausea, vomiting, constipation, diarrhea, hypoglycemia, injection site reaction, headache, and abdominal pain. We discussed contraindications including pregnancy. Patient denies history or family history of MEN2 or thyroid cancer, or history of pancreatitis. her does not take insulin or a sulfonylurea. We did discuss remote chance of renal impairment as it was associated with GI symptoms in trials.     Patient will start taking semaglutide at the 0.25mg dose subcutaneously. Yancy Hernandez verbalized understanding that her will take one dose weekly and will repeat this dosing for 1 month. We discussed storing future doses in the refrigerator and her was advised to take a dose if missed ASAP if the next scheduled dose is greater than 48 hours away.     Patient both watched, and was able to repeat demonstration of  administration using medication including site preparation, administration, and disposal. her will call and report any adverse effects to our group in the instance that any occur and we will advise at that time. Yancy Hernandez was given handouts regarding medication risks, common AE, and contraindications.    Patient was also provided education on how to store, clean vial and injection site, draw up and administer a subcutaneous injection. @HIS@ was provided a web link to a video to follow when administering her medication as well as a dosing guide with @HIS@ specific dose for the first four weeks.   .     Encounter Diagnoses   Name Primary?    Class 3 severe obesity due to excess calories with serious comorbidity and body mass index (BMI) of 50.0 to 59.9 in adult Yes    S/P laparoscopic sleeve gastrectomy     Status post repair of paraesophageal diaphragmatic hernia     Unintended weight gain     Essential hypertension     Pre-diabetes     Dietary counseling     HORTENCIA (obstructive sleep apnea)     Gastroesophageal reflux disease, unspecified whether esophagitis present        Plan:     Encouraged patient to be sure to get plenty of lean protein per day through small meals all with a protein source.   Activity restrictions: none.   Recommended patient be sure to get at least 70 grams of protein per day by eating small  meals all with high lean protein choices. Be sure to limit/cut back on daily carbohydrate intake. Discussed with the patient the recommended amount of water per day to intake. Reviewed vitamin requirements. Be sure to do routine exercise including strength training.    Instructed to call the office for concerns, questions, or problems.     The patient was instructed to follow up in 1 month.     The patient was counseled regarding the above procedure. Total time spent on this encounter was  30 minutes including reviewing previous notes, lab results and face to face time spent with the patient.  The  majority of time was spent counseling the patient regarding diet and exercise as well as reviewing their medications and their compliance with the procedure.

## 2025-01-10 ENCOUNTER — OFFICE VISIT (OUTPATIENT)
Dept: BARIATRICS/WEIGHT MGMT | Facility: CLINIC | Age: 45
End: 2025-01-10
Payer: COMMERCIAL

## 2025-01-10 VITALS
HEIGHT: 62 IN | WEIGHT: 293 LBS | SYSTOLIC BLOOD PRESSURE: 150 MMHG | TEMPERATURE: 98.1 F | DIASTOLIC BLOOD PRESSURE: 92 MMHG | BODY MASS INDEX: 53.92 KG/M2 | HEART RATE: 80 BPM

## 2025-01-10 DIAGNOSIS — I10 ESSENTIAL HYPERTENSION: ICD-10-CM

## 2025-01-10 DIAGNOSIS — E66.813 CLASS 3 SEVERE OBESITY DUE TO EXCESS CALORIES WITH SERIOUS COMORBIDITY AND BODY MASS INDEX (BMI) OF 50.0 TO 59.9 IN ADULT: Primary | ICD-10-CM

## 2025-01-10 DIAGNOSIS — Z87.19 STATUS POST REPAIR OF PARAESOPHAGEAL DIAPHRAGMATIC HERNIA: ICD-10-CM

## 2025-01-10 DIAGNOSIS — K21.9 GASTROESOPHAGEAL REFLUX DISEASE, UNSPECIFIED WHETHER ESOPHAGITIS PRESENT: ICD-10-CM

## 2025-01-10 DIAGNOSIS — Z98.890 STATUS POST REPAIR OF PARAESOPHAGEAL DIAPHRAGMATIC HERNIA: ICD-10-CM

## 2025-01-10 DIAGNOSIS — E66.01 CLASS 3 SEVERE OBESITY DUE TO EXCESS CALORIES WITH SERIOUS COMORBIDITY AND BODY MASS INDEX (BMI) OF 50.0 TO 59.9 IN ADULT: Primary | ICD-10-CM

## 2025-01-10 DIAGNOSIS — Z71.3 DIETARY COUNSELING: ICD-10-CM

## 2025-01-10 DIAGNOSIS — Z98.84 S/P LAPAROSCOPIC SLEEVE GASTRECTOMY: ICD-10-CM

## 2025-01-10 DIAGNOSIS — K59.01 SLOW TRANSIT CONSTIPATION: ICD-10-CM

## 2025-01-10 DIAGNOSIS — Z86.718 HISTORY OF DVT (DEEP VEIN THROMBOSIS): ICD-10-CM

## 2025-01-10 DIAGNOSIS — G47.33 OSA (OBSTRUCTIVE SLEEP APNEA): ICD-10-CM

## 2025-01-10 DIAGNOSIS — K76.0 NONALCOHOLIC FATTY LIVER DISEASE: ICD-10-CM

## 2025-01-10 PROCEDURE — 1160F RVW MEDS BY RX/DR IN RCRD: CPT | Performed by: SURGERY

## 2025-01-10 PROCEDURE — 3080F DIAST BP >= 90 MM HG: CPT | Performed by: SURGERY

## 2025-01-10 PROCEDURE — 99213 OFFICE O/P EST LOW 20 MIN: CPT | Performed by: SURGERY

## 2025-01-10 PROCEDURE — 1159F MED LIST DOCD IN RCRD: CPT | Performed by: SURGERY

## 2025-01-10 PROCEDURE — 3077F SYST BP >= 140 MM HG: CPT | Performed by: SURGERY

## 2025-01-10 NOTE — PROGRESS NOTES
[facility] BARIATRIC SURGERY  [address]  [facility] [id number] [phone]  [address]  [facility] [address] [phone]  Dept: [phone]  1/10/2025      Yancy Hernandez.  [id number]  [id number]  1980  female      Chief Complaint   Patient presents with    Follow-up     Fup [facility] Post-Op Bariatric Surgery:   Yancy Hernandez is status post Laparoscopic gastric sleeve w/ PHR procedure, performed on 12/21/22 who started on compound semaglutide last month    HPI:   Today's weight is (!) 144 kg (318 lb) pounds, today's BMI is Body mass index is 58.15 kg/m²., a  loss of 5 pounds since the last visit and weight loss since surgery is 38 pounds.    Yancy Hernandez denies fever chills chest pain shortness of air vomiting regurgitation and reports occasional nausea and heartburn that she had prior to starting the medication.  No side effects from the medication.  Patient states that she lost 8 pounds the first week but then has plateaued and has not lost any weight the past 2 weeks.     Diet and Exercise: Diet history reviewed and discussed with the patient. Weight loss/gains to date discussed with the patient. The patient states they are eating around unknown grams of protein per day. She reports eating 2 meals per day, a typical portion size of 1 cup, eating 1 snacks per day, drinking 5 or more 8-oz. glasses of water per day, no carbonated beverage consumption and exercising regularly.     Supplements: Yes.     Review of Systems   Gastrointestinal:  Positive for constipation and nausea.   Musculoskeletal:  Positive for arthralgias.   All other systems reviewed and are negative.        * No active hospital problems. *      Past Medical History:   Diagnosis Date    Arthritis     Carpal tunnel syndrome     Depression     HX    Elevated triglycerides with high cholesterol     GERD (gastroesophageal reflux disease)      Helicobacter pylori infection 11/27/2015    Hiatal hernia     History of anemia     History of blood clots     LT LEG, COUPLE YEARS AGO, TOOK ELIQUIS    History of COVID-19 2020    Hyperlipidemia     Hypertension     Knee pain, bilateral     Migraine     OAB (overactive bladder)     Obesity     Sleep apnea     CPAP       The following portions of the patient's history were reviewed and updated as appropriate: allergies, current medications, past family history, past medical history, past social history, past surgical history, and problem list.    Vitals:    01/10/25 0920   BP: 150/92   Pulse: 80   Temp: 98.1 °F (36.7 °C)       Physical Exam  Vitals reviewed.   HENT:      Head: Normocephalic and atraumatic.      Mouth/Throat:      Mouth: Mucous membranes are moist.      Pharynx: Oropharynx is clear.   Eyes:      General: No scleral icterus.     Extraocular Movements: Extraocular movements intact.      Conjunctiva/sclera: Conjunctivae normal.      Pupils: Pupils are equal, round, and reactive to light.   Neck:      Thyroid: No thyromegaly.      Comments: No palpable masses  Cardiovascular:      Rate and Rhythm: Normal rate.   Pulmonary:      Effort: Pulmonary effort is normal. No respiratory distress.      Breath sounds: Normal breath sounds. No stridor. No wheezing or rhonchi.   Abdominal:      General: Bowel sounds are normal.      Palpations: Abdomen is soft.      Tenderness: There is no abdominal tenderness. There is no right CVA tenderness, left CVA tenderness, guarding or rebound.      Hernia: No hernia is present.   Musculoskeletal:         General: Normal range of motion.      Cervical back: Normal range of motion and neck supple. No tenderness.   Lymphadenopathy:      Cervical: No cervical adenopathy.   Skin:     General: Skin is warm and dry.      Findings: No erythema.   Neurological:      Mental Status: She is alert and oriented to person, place, and time.   Psychiatric:         Mood and Affect: Mood  normal.         Behavior: Behavior normal.         Thought Content: Thought content normal.         Judgment: Judgment normal.         Assessment:   Post-op, the patient status post sleeve gastrectomy and paraesophageal hernia repair December 2022 he started on compound semaglutide last month and who has done well.  No side effects from the medication.  I instructed her again how to drop the medication make sure she is getting the correct dose and not increase the dose unless seen by our office.  Patient will follow-up in 1 month.  Patient would like to proceed with the medication and increase the dose to 0.5 mg.  Again no personal or family history of medullary thyroid carcinoma or M EN 2 syndrome.  Patient status post cholecystectomy.  I still did recommend Metamucil daily which she has a hard time remembering to take and MiraLAX as needed for constipation..     Encounter Diagnoses   Name Primary?    Class 3 severe obesity due to excess calories with serious comorbidity and body mass index (BMI) of 50.0 to 59.9 in adult Yes    S/P laparoscopic sleeve gastrectomy     Dietary counseling     Essential hypertension     History of DVT (deep vein thrombosis)     Status post repair of paraesophageal diaphragmatic hernia     Nonalcoholic fatty liver disease     Gastroesophageal reflux disease, unspecified whether esophagitis present     HORTENCIA (obstructive sleep apnea)     Slow transit constipation        Plan:     Encouraged patient to be sure to get plenty of lean protein per day through small meals all with a protein source.   Activity restrictions: none.   Recommended patient be sure to get at least 70 grams of protein per day by eating small  meals all with high lean protein choices. Be sure to limit/cut back on daily carbohydrate intake. Discussed with the patient the recommended amount of water per day to intake. Reviewed vitamin requirements. Be sure to do routine exercise including strength training.    Instructed  to call the office for concerns, questions, or problems.     The patient was instructed to follow up in 1 month.     The patient was counseled regarding the above procedure. Total time spent on this encounter was  25 minutes including reviewing previous notes, lab results and face to face time spent with the patient.  The majority of time was spent counseling the patient regarding diet and exercise as well as reviewing their medications and their compliance with the procedure.

## 2025-01-16 ENCOUNTER — LAB (OUTPATIENT)
Dept: LAB | Facility: HOSPITAL | Age: 45
End: 2025-01-16
Payer: COMMERCIAL

## 2025-01-16 DIAGNOSIS — Z86.19 HISTORY OF HELICOBACTER PYLORI INFECTION: ICD-10-CM

## 2025-01-16 DIAGNOSIS — Z98.84 S/P LAPAROSCOPIC SLEEVE GASTRECTOMY: ICD-10-CM

## 2025-01-16 DIAGNOSIS — E66.813 CLASS 3 SEVERE OBESITY DUE TO EXCESS CALORIES WITH SERIOUS COMORBIDITY AND BODY MASS INDEX (BMI) OF 50.0 TO 59.9 IN ADULT: ICD-10-CM

## 2025-01-16 DIAGNOSIS — I10 ESSENTIAL HYPERTENSION: ICD-10-CM

## 2025-01-16 DIAGNOSIS — E66.01 CLASS 3 SEVERE OBESITY DUE TO EXCESS CALORIES WITH SERIOUS COMORBIDITY AND BODY MASS INDEX (BMI) OF 50.0 TO 59.9 IN ADULT: ICD-10-CM

## 2025-01-16 LAB
ALBUMIN SERPL-MCNC: 3.8 G/DL (ref 3.5–5.2)
ALBUMIN/GLOB SERPL: 1.1 G/DL
ALP SERPL-CCNC: 62 U/L (ref 39–117)
ALT SERPL W P-5'-P-CCNC: 15 U/L (ref 1–33)
ANION GAP SERPL CALCULATED.3IONS-SCNC: 7 MMOL/L (ref 5–15)
AST SERPL-CCNC: 17 U/L (ref 1–32)
BASOPHILS # BLD AUTO: 0.07 10*3/MM3 (ref 0–0.2)
BASOPHILS NFR BLD AUTO: 1.1 % (ref 0–1.5)
BILIRUB SERPL-MCNC: 0.3 MG/DL (ref 0–1.2)
BUN SERPL-MCNC: 10 MG/DL (ref 6–20)
BUN/CREAT SERPL: 10.2 (ref 7–25)
CALCIUM SPEC-SCNC: 9.2 MG/DL (ref 8.6–10.5)
CHLORIDE SERPL-SCNC: 104 MMOL/L (ref 98–107)
CO2 SERPL-SCNC: 29 MMOL/L (ref 22–29)
CREAT SERPL-MCNC: 0.98 MG/DL (ref 0.57–1)
DEPRECATED RDW RBC AUTO: 35.1 FL (ref 37–54)
EGFRCR SERPLBLD CKD-EPI 2021: 73.1 ML/MIN/1.73
EOSINOPHIL # BLD AUTO: 0.37 10*3/MM3 (ref 0–0.4)
EOSINOPHIL NFR BLD AUTO: 5.8 % (ref 0.3–6.2)
ERYTHROCYTE [DISTWIDTH] IN BLOOD BY AUTOMATED COUNT: 11.7 % (ref 12.3–15.4)
GLOBULIN UR ELPH-MCNC: 3.5 GM/DL
GLUCOSE SERPL-MCNC: 91 MG/DL (ref 65–99)
HCT VFR BLD AUTO: 38.6 % (ref 34–46.6)
HGB BLD-MCNC: 12.3 G/DL (ref 12–15.9)
IMM GRANULOCYTES # BLD AUTO: 0.02 10*3/MM3 (ref 0–0.05)
IMM GRANULOCYTES NFR BLD AUTO: 0.3 % (ref 0–0.5)
LYMPHOCYTES # BLD AUTO: 2.05 10*3/MM3 (ref 0.7–3.1)
LYMPHOCYTES NFR BLD AUTO: 32.1 % (ref 19.6–45.3)
MCH RBC QN AUTO: 26.9 PG (ref 26.6–33)
MCHC RBC AUTO-ENTMCNC: 31.9 G/DL (ref 31.5–35.7)
MCV RBC AUTO: 84.3 FL (ref 79–97)
MONOCYTES # BLD AUTO: 0.64 10*3/MM3 (ref 0.1–0.9)
MONOCYTES NFR BLD AUTO: 10 % (ref 5–12)
NEUTROPHILS NFR BLD AUTO: 3.24 10*3/MM3 (ref 1.7–7)
NEUTROPHILS NFR BLD AUTO: 50.7 % (ref 42.7–76)
NRBC BLD AUTO-RTO: 0 /100 WBC (ref 0–0.2)
PLATELET # BLD AUTO: 339 10*3/MM3 (ref 140–450)
PMV BLD AUTO: 10.5 FL (ref 6–12)
POTASSIUM SERPL-SCNC: 3.7 MMOL/L (ref 3.5–5.2)
PROT SERPL-MCNC: 7.3 G/DL (ref 6–8.5)
RBC # BLD AUTO: 4.58 10*6/MM3 (ref 3.77–5.28)
SODIUM SERPL-SCNC: 140 MMOL/L (ref 136–145)
TSH SERPL DL<=0.05 MIU/L-ACNC: 1.21 UIU/ML (ref 0.27–4.2)
WBC NRBC COR # BLD AUTO: 6.39 10*3/MM3 (ref 3.4–10.8)

## 2025-01-16 PROCEDURE — 80053 COMPREHEN METABOLIC PANEL: CPT

## 2025-01-16 PROCEDURE — 83013 H PYLORI (C-13) BREATH: CPT

## 2025-01-16 PROCEDURE — 36415 COLL VENOUS BLD VENIPUNCTURE: CPT

## 2025-01-16 PROCEDURE — 84425 ASSAY OF VITAMIN B-1: CPT

## 2025-01-16 PROCEDURE — 84443 ASSAY THYROID STIM HORMONE: CPT

## 2025-01-16 PROCEDURE — 85025 COMPLETE CBC W/AUTO DIFF WBC: CPT

## 2025-01-17 LAB — UREA BREATH TEST QL: POSITIVE

## 2025-01-20 DIAGNOSIS — A04.8 H. PYLORI INFECTION: Primary | ICD-10-CM

## 2025-01-20 RX ORDER — TETRACYCLINE HYDROCHLORIDE 500 MG/1
1 TABLET, FILM COATED ORAL 4 TIMES DAILY
Qty: 56 EACH | Refills: 0 | Status: SHIPPED | OUTPATIENT
Start: 2025-01-20

## 2025-01-20 RX ORDER — METRONIDAZOLE 250 MG/1
250 TABLET ORAL 4 TIMES DAILY
Qty: 56 TABLET | Refills: 0 | Status: SHIPPED | OUTPATIENT
Start: 2025-01-20 | End: 2025-02-03

## 2025-01-21 LAB — VIT B1 BLD-SCNC: 73.7 NMOL/L (ref 66.5–200)

## 2025-01-23 RX ORDER — LANOLIN ALCOHOL/MO/W.PET/CERES
100 CREAM (GRAM) TOPICAL 2 TIMES DAILY
Qty: 60 TABLET | Refills: 5 | Status: SHIPPED | OUTPATIENT
Start: 2025-01-23 | End: 2025-07-22

## 2025-01-29 ENCOUNTER — TELEPHONE (OUTPATIENT)
Dept: BARIATRICS/WEIGHT MGMT | Facility: CLINIC | Age: 45
End: 2025-01-29
Payer: COMMERCIAL

## 2025-05-09 ENCOUNTER — OFFICE VISIT (OUTPATIENT)
Dept: BARIATRICS/WEIGHT MGMT | Facility: CLINIC | Age: 45
End: 2025-05-09
Payer: COMMERCIAL

## 2025-05-09 VITALS
HEIGHT: 62 IN | WEIGHT: 293 LBS | HEART RATE: 82 BPM | DIASTOLIC BLOOD PRESSURE: 90 MMHG | TEMPERATURE: 98.2 F | BODY MASS INDEX: 53.92 KG/M2 | SYSTOLIC BLOOD PRESSURE: 160 MMHG

## 2025-05-09 DIAGNOSIS — Z71.3 DIETARY COUNSELING: ICD-10-CM

## 2025-05-09 DIAGNOSIS — K59.01 SLOW TRANSIT CONSTIPATION: ICD-10-CM

## 2025-05-09 DIAGNOSIS — Z98.890 STATUS POST REPAIR OF PARAESOPHAGEAL DIAPHRAGMATIC HERNIA: ICD-10-CM

## 2025-05-09 DIAGNOSIS — I10 ESSENTIAL HYPERTENSION: ICD-10-CM

## 2025-05-09 DIAGNOSIS — K76.0 NONALCOHOLIC FATTY LIVER DISEASE: ICD-10-CM

## 2025-05-09 DIAGNOSIS — R13.19 OTHER DYSPHAGIA: ICD-10-CM

## 2025-05-09 DIAGNOSIS — E66.01 CLASS 3 SEVERE OBESITY DUE TO EXCESS CALORIES WITH SERIOUS COMORBIDITY AND BODY MASS INDEX (BMI) OF 60.0 TO 69.9 IN ADULT: Primary | ICD-10-CM

## 2025-05-09 DIAGNOSIS — G47.33 OSA (OBSTRUCTIVE SLEEP APNEA): ICD-10-CM

## 2025-05-09 DIAGNOSIS — Z87.19 STATUS POST REPAIR OF PARAESOPHAGEAL DIAPHRAGMATIC HERNIA: ICD-10-CM

## 2025-05-09 DIAGNOSIS — E66.813 CLASS 3 SEVERE OBESITY DUE TO EXCESS CALORIES WITH SERIOUS COMORBIDITY AND BODY MASS INDEX (BMI) OF 60.0 TO 69.9 IN ADULT: Primary | ICD-10-CM

## 2025-05-09 DIAGNOSIS — Z98.84 S/P LAPAROSCOPIC SLEEVE GASTRECTOMY: ICD-10-CM

## 2025-05-09 DIAGNOSIS — K21.9 GASTROESOPHAGEAL REFLUX DISEASE, UNSPECIFIED WHETHER ESOPHAGITIS PRESENT: ICD-10-CM

## 2025-05-09 DIAGNOSIS — R73.03 PRE-DIABETES: ICD-10-CM

## 2025-05-09 DIAGNOSIS — M25.50 MULTIPLE JOINT PAIN: ICD-10-CM

## 2025-05-09 RX ORDER — SODIUM CHLORIDE 0.9 % (FLUSH) 0.9 %
10 SYRINGE (ML) INJECTION AS NEEDED
OUTPATIENT
Start: 2025-05-09

## 2025-05-09 RX ORDER — SODIUM CHLORIDE 0.9 % (FLUSH) 0.9 %
10 SYRINGE (ML) INJECTION EVERY 12 HOURS SCHEDULED
OUTPATIENT
Start: 2025-05-09

## 2025-05-09 RX ORDER — SODIUM CHLORIDE 9 MG/ML
40 INJECTION, SOLUTION INTRAVENOUS AS NEEDED
OUTPATIENT
Start: 2025-05-09

## 2025-05-09 NOTE — PROGRESS NOTES
[facility] BARIATRIC SURGERY  [address]  [facility] [id number] [phone]  [address]  [facility] [id number] [phone]  Dept: [phone]  5/9/2025      Yancy Hernandez.  [id number]  [id number]  1980  female      Chief Complaint   Patient presents with    Follow-up     Fup Ozempic        Post-Op Bariatric Surgery:   Yancy Hernandez is status post Laparoscopic gastric sleeve w/ PHR procedure, performed on 12/21/22     HPI:   Today's weight is (!) 149 kg (329 lb) pounds, today's BMI is Body mass index is 60.16 kg/m²., a  gain of 9 pounds since the last visit and weight loss since surgery is 30 pounds.    Yancy Hernandez denies fever chills chest pain shortness of air abdominal pain and reports heartburn not controlled well with medication and intermittent dysphagia feeling full very quickly.  Patient has been on Ozempic for the past several months that she states that she is getting in [place].  She is on 0.5 mg and is going to 1 mg.  She does not want to be on medication lifelong and would prefer to proceed with conversion to gastric bypass.     Diet and Exercise: Diet history reviewed and discussed with the patient. Weight loss/gains to date discussed with the patient. The patient states they are eating around around 70 grams of protein per day. She reports eating 2 meals per day, a typical portion size of 1 cup, eating 2 snacks per day, drinking 5 or more 8-oz. glasses of water per day, no carbonated beverage consumption and exercising regularly.     Supplements: Yes.     Review of Systems   Constitutional:  Positive for fatigue.   Gastrointestinal:  Positive for constipation, nausea and vomiting.   Musculoskeletal:  Positive for arthralgias.   All other systems reviewed and are negative.        * No active hospital problems. *      Past Medical History:   Diagnosis Date    Arthritis     Carpal tunnel syndrome      Depression     HX    Elevated triglycerides with high cholesterol     GERD (gastroesophageal reflux disease)     Helicobacter pylori infection 11/27/2015    Hiatal hernia     History of anemia     History of blood clots     LT LEG, COUPLE YEARS AGO, TOOK ELIQUIS    History of COVID-19 2020    Hyperlipidemia     Hypertension     Knee pain, bilateral     Migraine     OAB (overactive bladder)     Obesity     Sleep apnea     CPAP       The following portions of the patient's history were reviewed and updated as appropriate: allergies, current medications, past family history, past medical history, past social history, past surgical history, and problem list.    Vitals:    05/09/25 0832   BP: 160/90   Pulse: 82   Temp: 98.2 °F (36.8 °C)       Physical Exam  Vitals reviewed.   HENT:      Head: Normocephalic and atraumatic.      Mouth/Throat:      Mouth: Mucous membranes are moist.      Pharynx: Oropharynx is clear.   Eyes:      General: No scleral icterus.     Extraocular Movements: Extraocular movements intact.      Conjunctiva/sclera: Conjunctivae normal.      Pupils: Pupils are equal, round, and reactive to light.   Neck:      Thyroid: No thyromegaly.   Cardiovascular:      Rate and Rhythm: Normal rate.   Pulmonary:      Effort: Pulmonary effort is normal. No respiratory distress.      Breath sounds: Normal breath sounds. No stridor. No wheezing or rhonchi.   Abdominal:      General: Bowel sounds are normal.      Palpations: Abdomen is soft.      Tenderness: There is no abdominal tenderness. There is no right CVA tenderness, left CVA tenderness, guarding or rebound.      Hernia: No hernia is present.   Musculoskeletal:         General: Normal range of motion.      Cervical back: Normal range of motion and neck supple.      Right lower leg: Edema present.      Left lower leg: Edema present.   Lymphadenopathy:      Cervical: No cervical adenopathy.   Skin:     General: Skin is warm and dry.      Findings: No erythema.    Neurological:      Mental Status: She is alert and oriented to person, place, and time.   Psychiatric:         Mood and Affect: Mood normal.         Behavior: Behavior normal.         Thought Content: Thought content normal.         Judgment: Judgment normal.         Assessment:   Post-op, the patient status post sleeve gastrectomy and paraesophageal hernia repair December 2022 who would like to lose more weight.  Patient also with heartburn not controlled well with medication and intermittent dysphagia with solids.  Patient would like to proceed with conversion to gastric bypass.  I went over all the different conversion procedures with her and all questions answered.  Patient will continue to research at home as well.  Patient is leaning towards the surgical gastric restrictive procedure with gastric bypass and small intestine reconstruction to limit absorption.     Encounter Diagnoses   Name Primary?    Class 3 severe obesity due to excess calories with serious comorbidity and body mass index (BMI) of 60.0 to 69.9 in adult Yes    S/P laparoscopic sleeve gastrectomy     Status post repair of paraesophageal diaphragmatic hernia     Gastroesophageal reflux disease, unspecified whether esophagitis present     Nonalcoholic fatty liver disease     Slow transit constipation     Pre-diabetes     Essential hypertension     Dietary counseling     HORTENCIA (obstructive sleep apnea)     Multiple joint pain     Other dysphagia        Plan:     Encouraged patient to be sure to get plenty of lean protein per day through small meals all with a protein source.   Activity restrictions: none.   Recommended patient be sure to get at least 70 grams of protein per day by eating small  meals all with high lean protein choices. Be sure to limit/cut back on daily carbohydrate intake. Discussed with the patient the recommended amount of water per day to intake. Reviewed vitamin requirements. Be sure to do routine exercise including strength  training.    Instructed to call the office for concerns, questions, or problems.     The patient was instructed to follow up in few weeks.     The patient was counseled regarding the above procedure. Total time spent on this encounter was  30 minutes including reviewing previous notes, lab results and face to face time spent with the patient.  The majority of time was spent counseling the patient regarding diet and exercise as well as reviewing their medications and their compliance with the procedure.

## 2025-05-09 NOTE — H&P (VIEW-ONLY)
[facility] BARIATRIC SURGERY  [address]  [facility] [id number] [phone]  [address]  [facility] [id number] [phone]  Dept: [phone]  5/9/2025      Yancy Hernandez.  [id number]  [id number]  1980  female      Chief Complaint   Patient presents with    Follow-up     Fup Ozempic        Post-Op Bariatric Surgery:   Yancy Hernandez is status post Laparoscopic gastric sleeve w/ PHR procedure, performed on 12/21/22     HPI:   Today's weight is (!) 149 kg (329 lb) pounds, today's BMI is Body mass index is 60.16 kg/m²., a  gain of 9 pounds since the last visit and weight loss since surgery is 30 pounds.    Yancy Hernandez denies fever chills chest pain shortness of air abdominal pain and reports heartburn not controlled well with medication and intermittent dysphagia feeling full very quickly.  Patient has been on Ozempic for the past several months that she states that she is getting in [place].  She is on 0.5 mg and is going to 1 mg.  She does not want to be on medication lifelong and would prefer to proceed with conversion to gastric bypass.     Diet and Exercise: Diet history reviewed and discussed with the patient. Weight loss/gains to date discussed with the patient. The patient states they are eating around around 70 grams of protein per day. She reports eating 2 meals per day, a typical portion size of 1 cup, eating 2 snacks per day, drinking 5 or more 8-oz. glasses of water per day, no carbonated beverage consumption and exercising regularly.     Supplements: Yes.     Review of Systems   Constitutional:  Positive for fatigue.   Gastrointestinal:  Positive for constipation, nausea and vomiting.   Musculoskeletal:  Positive for arthralgias.   All other systems reviewed and are negative.        * No active hospital problems. *      Past Medical History:   Diagnosis Date    Arthritis     Carpal tunnel syndrome      Depression     HX    Elevated triglycerides with high cholesterol     GERD (gastroesophageal reflux disease)     Helicobacter pylori infection 11/27/2015    Hiatal hernia     History of anemia     History of blood clots     LT LEG, COUPLE YEARS AGO, TOOK ELIQUIS    History of COVID-19 2020    Hyperlipidemia     Hypertension     Knee pain, bilateral     Migraine     OAB (overactive bladder)     Obesity     Sleep apnea     CPAP       The following portions of the patient's history were reviewed and updated as appropriate: allergies, current medications, past family history, past medical history, past social history, past surgical history, and problem list.    Vitals:    05/09/25 0832   BP: 160/90   Pulse: 82   Temp: 98.2 °F (36.8 °C)       Physical Exam  Vitals reviewed.   HENT:      Head: Normocephalic and atraumatic.      Mouth/Throat:      Mouth: Mucous membranes are moist.      Pharynx: Oropharynx is clear.   Eyes:      General: No scleral icterus.     Extraocular Movements: Extraocular movements intact.      Conjunctiva/sclera: Conjunctivae normal.      Pupils: Pupils are equal, round, and reactive to light.   Neck:      Thyroid: No thyromegaly.   Cardiovascular:      Rate and Rhythm: Normal rate.   Pulmonary:      Effort: Pulmonary effort is normal. No respiratory distress.      Breath sounds: Normal breath sounds. No stridor. No wheezing or rhonchi.   Abdominal:      General: Bowel sounds are normal.      Palpations: Abdomen is soft.      Tenderness: There is no abdominal tenderness. There is no right CVA tenderness, left CVA tenderness, guarding or rebound.      Hernia: No hernia is present.   Musculoskeletal:         General: Normal range of motion.      Cervical back: Normal range of motion and neck supple.      Right lower leg: Edema present.      Left lower leg: Edema present.   Lymphadenopathy:      Cervical: No cervical adenopathy.   Skin:     General: Skin is warm and dry.      Findings: No erythema.    Neurological:      Mental Status: She is alert and oriented to person, place, and time.   Psychiatric:         Mood and Affect: Mood normal.         Behavior: Behavior normal.         Thought Content: Thought content normal.         Judgment: Judgment normal.         Assessment:   Post-op, the patient status post sleeve gastrectomy and paraesophageal hernia repair December 2022 who would like to lose more weight.  Patient also with heartburn not controlled well with medication and intermittent dysphagia with solids.  Patient would like to proceed with conversion to gastric bypass.  I went over all the different conversion procedures with her and all questions answered.  Patient will continue to research at home as well.  Patient is leaning towards the surgical gastric restrictive procedure with gastric bypass and small intestine reconstruction to limit absorption.     Encounter Diagnoses   Name Primary?    Class 3 severe obesity due to excess calories with serious comorbidity and body mass index (BMI) of 60.0 to 69.9 in adult Yes    S/P laparoscopic sleeve gastrectomy     Status post repair of paraesophageal diaphragmatic hernia     Gastroesophageal reflux disease, unspecified whether esophagitis present     Nonalcoholic fatty liver disease     Slow transit constipation     Pre-diabetes     Essential hypertension     Dietary counseling     HORTENCIA (obstructive sleep apnea)     Multiple joint pain     Other dysphagia        Plan:     Encouraged patient to be sure to get plenty of lean protein per day through small meals all with a protein source.   Activity restrictions: none.   Recommended patient be sure to get at least 70 grams of protein per day by eating small  meals all with high lean protein choices. Be sure to limit/cut back on daily carbohydrate intake. Discussed with the patient the recommended amount of water per day to intake. Reviewed vitamin requirements. Be sure to do routine exercise including strength  training.    Instructed to call the office for concerns, questions, or problems.     The patient was instructed to follow up in few weeks.     The patient was counseled regarding the above procedure. Total time spent on this encounter was  30 minutes including reviewing previous notes, lab results and face to face time spent with the patient.  The majority of time was spent counseling the patient regarding diet and exercise as well as reviewing their medications and their compliance with the procedure.

## 2025-05-20 ENCOUNTER — HOSPITAL ENCOUNTER (OUTPATIENT)
Facility: HOSPITAL | Age: 45
Setting detail: HOSPITAL OUTPATIENT SURGERY
Discharge: HOME | End: 2025-05-20
Attending: SURGERY | Admitting: SURGERY
Payer: COMMERCIAL

## 2025-05-20 ENCOUNTER — ANESTHESIA (OUTPATIENT)
Dept: GASTROENTEROLOGY | Facility: HOSPITAL | Age: 45
End: 2025-05-20
Payer: COMMERCIAL

## 2025-05-20 ENCOUNTER — ANESTHESIA EVENT (OUTPATIENT)
Dept: GASTROENTEROLOGY | Facility: HOSPITAL | Age: 45
End: 2025-05-20
Payer: COMMERCIAL

## 2025-05-20 VITALS
DIASTOLIC BLOOD PRESSURE: 89 MMHG | WEIGHT: 293 LBS | BODY MASS INDEX: 53.92 KG/M2 | SYSTOLIC BLOOD PRESSURE: 155 MMHG | OXYGEN SATURATION: 100 % | HEIGHT: 62 IN | HEART RATE: 75 BPM | RESPIRATION RATE: 18 BRPM

## 2025-05-20 DIAGNOSIS — I10 ESSENTIAL HYPERTENSION: ICD-10-CM

## 2025-05-20 DIAGNOSIS — Z98.84 S/P LAPAROSCOPIC SLEEVE GASTRECTOMY: ICD-10-CM

## 2025-05-20 DIAGNOSIS — Z87.19 STATUS POST REPAIR OF PARAESOPHAGEAL DIAPHRAGMATIC HERNIA: ICD-10-CM

## 2025-05-20 DIAGNOSIS — E66.813 CLASS 3 SEVERE OBESITY DUE TO EXCESS CALORIES WITH SERIOUS COMORBIDITY AND BODY MASS INDEX (BMI) OF 60.0 TO 69.9 IN ADULT: ICD-10-CM

## 2025-05-20 DIAGNOSIS — K21.9 GASTROESOPHAGEAL REFLUX DISEASE, UNSPECIFIED WHETHER ESOPHAGITIS PRESENT: ICD-10-CM

## 2025-05-20 DIAGNOSIS — Z98.890 STATUS POST REPAIR OF PARAESOPHAGEAL DIAPHRAGMATIC HERNIA: ICD-10-CM

## 2025-05-20 DIAGNOSIS — E66.01 CLASS 3 SEVERE OBESITY DUE TO EXCESS CALORIES WITH SERIOUS COMORBIDITY AND BODY MASS INDEX (BMI) OF 60.0 TO 69.9 IN ADULT: ICD-10-CM

## 2025-05-20 DIAGNOSIS — Z71.3 DIETARY COUNSELING: ICD-10-CM

## 2025-05-20 DIAGNOSIS — K59.01 SLOW TRANSIT CONSTIPATION: ICD-10-CM

## 2025-05-20 DIAGNOSIS — K76.0 NONALCOHOLIC FATTY LIVER DISEASE: ICD-10-CM

## 2025-05-20 DIAGNOSIS — R73.03 PRE-DIABETES: ICD-10-CM

## 2025-05-20 DIAGNOSIS — G47.33 OSA (OBSTRUCTIVE SLEEP APNEA): ICD-10-CM

## 2025-05-20 DIAGNOSIS — M25.50 MULTIPLE JOINT PAIN: ICD-10-CM

## 2025-05-20 PROBLEM — K21.00 GASTROESOPHAGEAL REFLUX DISEASE WITH ESOPHAGITIS WITHOUT HEMORRHAGE: Status: ACTIVE | Noted: 2025-05-20

## 2025-05-20 PROCEDURE — 25010000002 PROPOFOL 10 MG/ML EMULSION: Performed by: NURSE ANESTHETIST, CERTIFIED REGISTERED

## 2025-05-20 PROCEDURE — 25010000002 LIDOCAINE 2% SOLUTION: Performed by: NURSE ANESTHETIST, CERTIFIED REGISTERED

## 2025-05-20 PROCEDURE — 25010000002 GLYCOPYRROLATE 0.2 MG/ML SOLUTION: Performed by: NURSE ANESTHETIST, CERTIFIED REGISTERED

## 2025-05-20 PROCEDURE — 25810000003 LACTATED RINGERS PER 1000 ML: Performed by: SURGERY

## 2025-05-20 PROCEDURE — 88342 IMHCHEM/IMCYTCHM 1ST ANTB: CPT | Performed by: SURGERY

## 2025-05-20 PROCEDURE — 88305 TISSUE EXAM BY PATHOLOGIST: CPT | Performed by: SURGERY

## 2025-05-20 PROCEDURE — 43239 EGD BIOPSY SINGLE/MULTIPLE: CPT | Performed by: SURGERY

## 2025-05-20 RX ORDER — PROPOFOL 10 MG/ML
VIAL (ML) INTRAVENOUS AS NEEDED
Status: DISCONTINUED | OUTPATIENT
Start: 2025-05-20 | End: 2025-05-20 | Stop reason: SURG

## 2025-05-20 RX ORDER — LIDOCAINE HYDROCHLORIDE 20 MG/ML
INJECTION, SOLUTION INFILTRATION; PERINEURAL AS NEEDED
Status: DISCONTINUED | OUTPATIENT
Start: 2025-05-20 | End: 2025-05-20 | Stop reason: SURG

## 2025-05-20 RX ORDER — GLYCOPYRROLATE 0.2 MG/ML
INJECTION INTRAMUSCULAR; INTRAVENOUS AS NEEDED
Status: DISCONTINUED | OUTPATIENT
Start: 2025-05-20 | End: 2025-05-20 | Stop reason: SURG

## 2025-05-20 RX ORDER — SODIUM CHLORIDE, SODIUM LACTATE, POTASSIUM CHLORIDE, CALCIUM CHLORIDE 600; 310; 30; 20 MG/100ML; MG/100ML; MG/100ML; MG/100ML
20 INJECTION, SOLUTION INTRAVENOUS CONTINUOUS
Status: DISCONTINUED | OUTPATIENT
Start: 2025-05-20 | End: 2025-05-20 | Stop reason: HOSPADM

## 2025-05-20 RX ADMIN — LIDOCAINE HYDROCHLORIDE 100 MG: 20 INJECTION, SOLUTION INFILTRATION; PERINEURAL at 09:47

## 2025-05-20 RX ADMIN — PROPOFOL 150 MG: 10 INJECTION, EMULSION INTRAVENOUS at 09:47

## 2025-05-20 RX ADMIN — SODIUM CHLORIDE, POTASSIUM CHLORIDE, SODIUM LACTATE AND CALCIUM CHLORIDE 20 ML/HR: 600; 310; 30; 20 INJECTION, SOLUTION INTRAVENOUS at 09:25

## 2025-05-20 RX ADMIN — GLYCOPYRROLATE 0.1 MG: 0.2 INJECTION INTRAMUSCULAR; INTRAVENOUS at 09:47

## 2025-05-20 NOTE — ANESTHESIA PREPROCEDURE EVALUATION
Anesthesia Evaluation     Patient summary reviewed and Nursing notes reviewed                Airway   Mallampati: III  Large neck circumference and Possible difficult intubation  Dental      Pulmonary    (+) ,sleep apnea on CPAP  Cardiovascular     ECG reviewed  Rhythm: regular  Rate: normal    (+) hypertension, hyperlipidemia      Neuro/Psych  (+) headaches, numbness, psychiatric history Depression  GI/Hepatic/Renal/Endo    (+) morbid obesity, hiatal hernia, GERD, liver disease fatty liver disease    Musculoskeletal     Abdominal    Substance History - negative use     OB/GYN negative ob/gyn ROS         Other   arthritis,                 Anesthesia Plan    ASA 3     MAC     (Ozempic last injected 5/10/2025    BMI  Grade 2b DL airway view by history  )  intravenous induction     Anesthetic plan, risks, benefits, and alternatives have been provided, discussed and informed consent has been obtained with: patient.    CODE STATUS:

## 2025-05-20 NOTE — ANESTHESIA POSTPROCEDURE EVALUATION
Patient: Yancy Hernandez    Procedure Summary       Date: 05/20/25 Room / Location: [facility] ENDOSCOPY 1 /  [facility]    Anesthesia Start: 0943 Anesthesia Stop: 0958    Procedure: ESOPHAGOGASTRODUODENOSCOPY WITH BIOPSY (Esophagus) Diagnosis:       Class 3 severe obesity due to excess calories with serious comorbidity and body mass index (BMI) of 60.0 to 69.9 in adult      S/P laparoscopic sleeve gastrectomy      Status post repair of paraesophageal diaphragmatic hernia      Gastroesophageal reflux disease, unspecified whether esophagitis present      Nonalcoholic fatty liver disease      Slow transit constipation      Pre-diabetes      Essential hypertension      Dietary counseling      HORTENCIA (obstructive sleep apnea)      Multiple joint pain      (Class 3 severe obesity due to excess calories with serious comorbidity and body mass index (BMI) of 60.0 to 69.9 in adult [E66.813, E66.01, Z68.44])      (S/P laparoscopic sleeve gastrectomy [Z98.84])      (Status post repair of paraesophageal diaphragmatic hernia [Z98.890, Z87.19])      (Gastroesophageal reflux disease, unspecified whether esophagitis present [K21.9])      (Nonalcoholic fatty liver disease [K76.0])      (Slow transit constipation [K59.01])      (Pre-diabetes [R73.03])      (Essential hypertension [I10])      (Dietary counseling [Z71.3])      (HORTENCIA (obstructive sleep apnea) [G47.33])      (Multiple joint pain [M25.50])    Surgeons: Kevin Fuentes Jr., MD Provider: Richard Briscoe MD    Anesthesia Type: MAC ASA Status: 3            Anesthesia Type: MAC    Vitals  Vitals Value Taken Time   /101 05/20/25 09:57   Temp     Pulse 70 05/20/25 10:03   Resp 18 05/20/25 09:57   SpO2 95 % 05/20/25 10:03   Vitals shown include unfiled device data.        Post Anesthesia Care and Evaluation    Patient location during evaluation: PACU  Patient participation: complete - patient participated  Level of consciousness: awake and alert  Pain  management: adequate    Airway patency: patent  Anesthetic complications: No anesthetic complications    Cardiovascular status: acceptable  Respiratory status: acceptable  Hydration status: acceptable    Comments: ---------------------            05/20/25                0957      ---------------------   BP:     (!) 143/101   Pulse:      73        Resp:       18        SpO2:       96%      ---------------------

## 2025-05-20 NOTE — OP NOTE
Surgeon: Kevin Fuentes Jr., M.D.    Preoperative Diagnosis: #1 GERD #2 dysphagia #3 history of gastric sleeve and paraesophageal hernia.    Postoperative Diagnosis: #1 gastritis #2 LA grade A esophagitis    Procedure Performed: Transoral esophagogastroduodenoscopy with gastric antral and distal esophageal biopsies    Indications: 45-year-old female status post sleeve gastrectomy paraesophageal hernia.  December 2022 with complaints of GERD not controlled well with medication.    Procedure:     The procedure, indications, preparation and potential complications were explained to the patient, who indicated understanding and signed the corresponding consent forms.  The patient was identified, taken to the endoscopy suite, and placed on the left side down decubitus position.  The patient underwent a MAC anesthesia and was appropriately monitored through the case by the anesthesia personnel with continuous pulse oximetry, blood pressure, and cardiac monitoring.  A bite block was placed.  After adequate IV sedation and using a transoral technique a lubed flexible endoscope was placed in the hypopharynx and advanced to the second portion of the duodenum without difficulty. The scope was then withdrawn back into the antrum of the stomach.  Cold forcep biopsies of the antrum were taken to rule out Helicobacter pylori.  The scope was retroflexed noting the body, fundus and cardia.  The scope was then withdrawn back into the esophagus after decompressing the stomach.  The Z line was noted and GE junction measured from the incisors.  The scope was then completely withdrawn.  The patient tolerated the procedure well and left the endoscopy suite in stable condition.  The findings are listed below.    Duodenum: Unremarkable  Antrum: Patchy erythema  Body/Fundus: Consistent with gastric sleeve without stricture or dilatation  Cardia: Questionable small defect  Esophagus: Z-line irregular with 1 area of erythema extending  proximally less than 5 mm.  Multiple cold forcep biopsies taken to rule out Rashid's.  No active bleeding noted    Recommendations:     Continue with PPI and await biopsy results.  Will plan to proceed with conversion to gastric bypass.

## 2025-05-20 NOTE — DISCHARGE INSTRUCTIONS
For 24 hours DO NOT drive, operate machinery, appliances, drink alcohol, make important decisions or sign legal documents.    Start with a light or bland diet if you are feeling sick to your stomach otherwise advance to regular diet as tolerated.    Follow recommendations on procedure report if provided by your doctor.    Call Dr Fuentes for problems [phone].  Biopsy results within 2 weeks.      Problems may include but not limited to: large amounts of bleeding, trouble breathing, repeated vomiting, severe unrelieved pain, fever or chills.       Yes

## 2025-05-22 ENCOUNTER — TELEPHONE (OUTPATIENT)
Dept: BARIATRICS/WEIGHT MGMT | Facility: CLINIC | Age: 45
End: 2025-05-22
Payer: COMMERCIAL

## 2025-05-22 LAB
CYTO UR: NORMAL
LAB AP CASE REPORT: NORMAL
LAB AP SPECIAL STAINS: NORMAL
PATH REPORT.FINAL DX SPEC: NORMAL
PATH REPORT.GROSS SPEC: NORMAL

## 2025-05-22 RX ORDER — PANTOPRAZOLE SODIUM 40 MG/1
40 TABLET, DELAYED RELEASE ORAL DAILY
Qty: 30 TABLET | Refills: 5 | Status: SHIPPED | OUTPATIENT
Start: 2025-05-22

## 2025-05-22 NOTE — TELEPHONE ENCOUNTER
I talked to patient on the phone regarding her biopsy results.  We will send in treatment for the H. pylori bacteria.  She was positive last year as well.  Esophageal biopsy did come back as intestinal metaplasia.  I instructed patient about Rashid's esophagus and all of her questions were answered.  We will start on Protonix which she understands she will need to take for her life.  We would also like to proceed to Ronnie-en-Y gastric bypass.  Patient understands the disease of Rashid's esophagus and knows that they will need surveillance endoscopy and continued treatment with a proton pump inhibitor.

## 2025-05-23 ENCOUNTER — TELEPHONE (OUTPATIENT)
Dept: BARIATRICS/WEIGHT MGMT | Facility: CLINIC | Age: 45
End: 2025-05-23
Payer: COMMERCIAL

## 2025-05-23 DIAGNOSIS — A04.8 BACTERIAL INFECTION DUE TO H. PYLORI: Primary | ICD-10-CM

## 2025-05-23 RX ORDER — LANSOPRAZOLE 30 MG/1
30 CAPSULE, DELAYED RELEASE ORAL 2 TIMES DAILY
Qty: 28 CAPSULE | Refills: 0 | Status: SHIPPED | OUTPATIENT
Start: 2025-05-23

## 2025-05-23 RX ORDER — CLARITHROMYCIN 500 MG/1
500 TABLET ORAL EVERY 12 HOURS SCHEDULED
Qty: 28 TABLET | Refills: 0 | Status: SHIPPED | OUTPATIENT
Start: 2025-05-23 | End: 2025-06-06

## 2025-05-23 RX ORDER — AMOXICILLIN 500 MG/1
1000 CAPSULE ORAL 2 TIMES DAILY
Qty: 56 CAPSULE | Refills: 0 | Status: SHIPPED | OUTPATIENT
Start: 2025-05-23 | End: 2025-06-06

## 2025-05-23 NOTE — TELEPHONE ENCOUNTER
Pt informed of how to take ylori meds and I will get with sarai to see when we can submit to insurance for RNY

## 2025-05-30 ENCOUNTER — OFFICE VISIT (OUTPATIENT)
Dept: OBSTETRICS AND GYNECOLOGY | Age: 45
End: 2025-05-30
Payer: COMMERCIAL

## 2025-05-30 VITALS
DIASTOLIC BLOOD PRESSURE: 72 MMHG | SYSTOLIC BLOOD PRESSURE: 106 MMHG | BODY MASS INDEX: 53.92 KG/M2 | WEIGHT: 293 LBS | HEIGHT: 62 IN

## 2025-05-30 DIAGNOSIS — Z98.890 HISTORY OF ENDOMETRIAL ABLATION: ICD-10-CM

## 2025-05-30 DIAGNOSIS — Z11.51 SCREENING FOR HUMAN PAPILLOMAVIRUS (HPV): ICD-10-CM

## 2025-05-30 DIAGNOSIS — N64.52 NIPPLE DISCHARGE: ICD-10-CM

## 2025-05-30 DIAGNOSIS — N90.7 INCLUSION CYST OF VULVA: ICD-10-CM

## 2025-05-30 DIAGNOSIS — Z12.4 SCREENING FOR MALIGNANT NEOPLASM OF CERVIX: ICD-10-CM

## 2025-05-30 DIAGNOSIS — Z01.419 WELL FEMALE EXAM WITH ROUTINE GYNECOLOGICAL EXAM: Primary | ICD-10-CM

## 2025-05-30 DIAGNOSIS — Z12.11 SCREEN FOR COLON CANCER: ICD-10-CM

## 2025-05-30 DIAGNOSIS — E66.01 CLASS 3 SEVERE OBESITY DUE TO EXCESS CALORIES WITH SERIOUS COMORBIDITY AND BODY MASS INDEX (BMI) OF 60.0 TO 69.9 IN ADULT: ICD-10-CM

## 2025-05-30 DIAGNOSIS — E66.813 CLASS 3 SEVERE OBESITY DUE TO EXCESS CALORIES WITH SERIOUS COMORBIDITY AND BODY MASS INDEX (BMI) OF 60.0 TO 69.9 IN ADULT: ICD-10-CM

## 2025-05-30 NOTE — PROGRESS NOTES
Subjective     History of Present Illness    Chief Complaint   Patient presents with    Gynecologic Exam     Annual. Patient has concerns about bump inside vaginal area.   Last Pap Smear - 2024 Neg/HPV Neg  Mammogram - 2023       Yancy Hernandez is a 45 y.o. female  who presents for annual exam.  C/o L breast leakage x 2 months. States L breast has always had leakage if she pushes really hard but recently she can barely touch it and clear liquid comes out.   C/o bump in vagina feels like it has moved further down. She has had an inclusion cyst for the last 2 years, states it is now tender when touched when it previously was not.   Hx ablation. Light monthly menses. Lasts 2 days. Not taking provera.  Due for screening mammogram, last mammo negative 2023.  Has not had colon cancer screening, pt agreeable to colonoscopy referral.    Relevant data reviewed:      Obstetric History:  OB History          9    Para   8    Term   8            AB   1    Living   8         SAB        IAB        Ectopic        Molar        Multiple        Live Births   8               Menstrual History:     No LMP recorded. Patient has had an ablation.           Current contraception: tubal ligation  History of abnormal Pap smear: no  Received Gardasil immunization: no  Perform regular self breast exam: yes -    Family history of uterine or ovarian cancer: no  Family History of colon cancer: no  Family history of breast cancer: yes - MGM dx 54 y/o    PAP: done today  Mammogram: ordered  Colonoscopy: recommended   DEXA: not indicated    Exercise: not active  Calcium/Vitamin D: adequate intake    The following portions of the patient's history were reviewed and updated as appropriate: allergies, current medications, past family history, past medical history, past social history, past surgical history, and problem list.    Review of Systems  Pertinent items are noted in HPI.       Objective   Physical  "Exam  Genitourinary:         Comments: Inclusion cyst inside R labia minora, it is small 0.5 cm and mobile. Patient reports tenderness when inclusion cyst is palpated.         /72 (BP Location: Right arm, Patient Position: Sitting, Cuff Size: Large Adult)   Ht 157.5 cm (62\")   Wt (!) 150 kg (331 lb 9.6 oz)   BMI 60.65 kg/m²      General: alert, appears stated age, cooperative, and no distress   Heart: regular rate and rhythm, S1, S2 normal, no murmur, click, rub or gallop   Lungs: clear to auscultation bilaterally   Abdomen: soft, non-tender, without masses or organomegaly   Breast: Clear discharge from L nipple on palpation, ,no masses or nodules palpated bilaterally, no skin changes or swelling bilaterally, no nipple discharge or bleeding on R breast   External genitalia/Vulva: R labia inclusion cyst - see image above, External genitalia including bartholin's glands, Urethra, [place]'s gland and urethra meatus are normal, and Bladder appears normal without significant prolapse    Vagina: normal mucosa, normal discharge   Cervix: no lesions   Uterus: normal size and non-tender   Adnexa: normal adnexa and no mass, fullness, tenderness   Neurologic: Alert and Oriented x3   Psychiatric: Normal affect, judgement, and mood       Assessment & Plan   Diagnoses and all orders for this visit:    1. Well female exam with routine gynecological exam (Primary)  -     IGP, Apt HPV,rfx 16 / 18,45    2. Screening for human papillomavirus (HPV)  -     IGP, Apt HPV,rfx 16 / 18,45    3. Screening for malignant neoplasm of cervix  -     IGP, Apt HPV,rfx 16 / 18,45    4. Nipple discharge - L breast, clear discharge  -     TSH Rfx On Abnormal To Free T4  -     Comprehensive Metabolic Panel  -     Prolactin  -     Mammo Diagnostic Digital Tomosynthesis Bilateral With CAD; Future  -     US Breast Left Limited; Future    5. Screen for colon cancer  -     Ambulatory Referral For Screening Colonoscopy    6. Inclusion cyst of " vulva    7. History of endometrial ablation    8. Class 3 severe obesity due to excess calories with serious comorbidity and body mass index (BMI) of 60.0 to 69.9 in adult          PAP smear with HPV co-testing completed today  Blood tests: Comprehensive metabolic panel, Prolactin hormone level, and TSH.  Referral for colonoscopy placed  L breast nipple discharge -- labs completed today. Bilateral diagnostic mammogram and L breast US ordered.   Will call patient with results and treat accordingly.   All questions answered.  Breast self exam technique reviewed and patient encouraged to perform self-exam monthly.  Physical activity and regular exercise encouraged.  Discussed healthy lifestyle modifications.  Discussed calcium and vitamin D needs to prevent osteoporosis.    Reviewed with pt inclusion cyst on R labia is benign and appears stable, but since it is now causing her pain I advised f/u with Dr. Ramirez.    Patient has hx endometrial ablation and continues having light menses. She was previously prescribed provera by Dr. Ramirez, but reports she has not been taking this. Given patient's risk factors including obesity, I advised f/u with Dr. Ramirez to discuss if she needs to continue provera or if current bleeding pattern is okay.     Return in about 1 month (around 6/30/2025) for gyn f/u with Dr. Ramirez (must be Link) - provera discussion / vaginal inclusion cyst.       Kimberly Blackwell PA-C  5/30/2025 20:06 EDT

## 2025-05-31 LAB
ALBUMIN SERPL-MCNC: 4.1 G/DL (ref 3.5–5.2)
ALBUMIN/GLOB SERPL: 1.5 G/DL
ALP SERPL-CCNC: 60 U/L (ref 39–117)
ALT SERPL-CCNC: 15 U/L (ref 1–33)
AST SERPL-CCNC: 17 U/L (ref 1–32)
BILIRUB SERPL-MCNC: 0.3 MG/DL (ref 0–1.2)
BUN SERPL-MCNC: 9 MG/DL (ref 6–20)
BUN/CREAT SERPL: 9.4 (ref 7–25)
CALCIUM SERPL-MCNC: 9.5 MG/DL (ref 8.6–10.5)
CHLORIDE SERPL-SCNC: 102 MMOL/L (ref 98–107)
CO2 SERPL-SCNC: 29.7 MMOL/L (ref 22–29)
CREAT SERPL-MCNC: 0.96 MG/DL (ref 0.57–1)
EGFRCR SERPLBLD CKD-EPI 2021: 74.5 ML/MIN/1.73
GLOBULIN SER CALC-MCNC: 2.8 GM/DL
GLUCOSE SERPL-MCNC: 86 MG/DL (ref 65–99)
POTASSIUM SERPL-SCNC: 4.4 MMOL/L (ref 3.5–5.2)
PROLACTIN SERPL-MCNC: 15.6 NG/ML (ref 4.8–33.4)
PROT SERPL-MCNC: 6.9 G/DL (ref 6–8.5)
SODIUM SERPL-SCNC: 142 MMOL/L (ref 136–145)
TSH SERPL DL<=0.005 MIU/L-ACNC: 1.55 UIU/ML (ref 0.27–4.2)

## 2025-06-05 LAB
CYTOLOGIST CVX/VAG CYTO: NORMAL
CYTOLOGY CVX/VAG DOC CYTO: NORMAL
CYTOLOGY CVX/VAG DOC THIN PREP: NORMAL
DX ICD CODE: NORMAL
HPV I/H RISK 4 DNA CVX QL PROBE+SIG AMP: NEGATIVE
Lab: NORMAL
OTHER STN SPEC: NORMAL
SERVICE CMNT-IMP: NORMAL
STAT OF ADQ CVX/VAG CYTO-IMP: NORMAL

## 2025-06-10 ENCOUNTER — APPOINTMENT (OUTPATIENT)
Dept: GENERAL RADIOLOGY | Facility: HOSPITAL | Age: 45
End: 2025-06-10
Payer: COMMERCIAL

## 2025-06-10 LAB
ALBUMIN SERPL-MCNC: 4.1 G/DL (ref 3.5–5.2)
ALBUMIN/GLOB SERPL: 1.3 G/DL
ALP SERPL-CCNC: 62 U/L (ref 39–117)
ALT SERPL W P-5'-P-CCNC: 19 U/L (ref 1–33)
ANION GAP SERPL CALCULATED.3IONS-SCNC: 12.2 MMOL/L (ref 5–15)
AST SERPL-CCNC: 19 U/L (ref 1–32)
BASOPHILS # BLD AUTO: 0.05 10*3/MM3 (ref 0–0.2)
BASOPHILS NFR BLD AUTO: 0.7 % (ref 0–1.5)
BILIRUB SERPL-MCNC: 0.3 MG/DL (ref 0–1.2)
BUN SERPL-MCNC: 10.4 MG/DL (ref 6–20)
BUN/CREAT SERPL: 10.4 (ref 7–25)
CALCIUM SPEC-SCNC: 9.1 MG/DL (ref 8.6–10.5)
CHLORIDE SERPL-SCNC: 103 MMOL/L (ref 98–107)
CO2 SERPL-SCNC: 24.8 MMOL/L (ref 22–29)
CREAT SERPL-MCNC: 1 MG/DL (ref 0.57–1)
D-LACTATE SERPL-SCNC: 1.3 MMOL/L (ref 0.5–2)
DEPRECATED RDW RBC AUTO: 39.6 FL (ref 37–54)
EGFRCR SERPLBLD CKD-EPI 2021: 70.9 ML/MIN/1.73
EOSINOPHIL # BLD AUTO: 0.54 10*3/MM3 (ref 0–0.4)
EOSINOPHIL NFR BLD AUTO: 7.2 % (ref 0.3–6.2)
ERYTHROCYTE [DISTWIDTH] IN BLOOD BY AUTOMATED COUNT: 12.3 % (ref 12.3–15.4)
GLOBULIN UR ELPH-MCNC: 3.2 GM/DL
GLUCOSE SERPL-MCNC: 125 MG/DL (ref 65–99)
HCT VFR BLD AUTO: 37.8 % (ref 34–46.6)
HGB BLD-MCNC: 11.7 G/DL (ref 12–15.9)
HOLD SPECIMEN: NORMAL
HOLD SPECIMEN: NORMAL
IMM GRANULOCYTES # BLD AUTO: 0.02 10*3/MM3 (ref 0–0.05)
IMM GRANULOCYTES NFR BLD AUTO: 0.3 % (ref 0–0.5)
LYMPHOCYTES # BLD AUTO: 1.9 10*3/MM3 (ref 0.7–3.1)
LYMPHOCYTES NFR BLD AUTO: 25.3 % (ref 19.6–45.3)
MCH RBC QN AUTO: 27.3 PG (ref 26.6–33)
MCHC RBC AUTO-ENTMCNC: 31 G/DL (ref 31.5–35.7)
MCV RBC AUTO: 88.1 FL (ref 79–97)
MONOCYTES # BLD AUTO: 0.64 10*3/MM3 (ref 0.1–0.9)
MONOCYTES NFR BLD AUTO: 8.5 % (ref 5–12)
NEUTROPHILS NFR BLD AUTO: 4.37 10*3/MM3 (ref 1.7–7)
NEUTROPHILS NFR BLD AUTO: 58 % (ref 42.7–76)
NRBC BLD AUTO-RTO: 0 /100 WBC (ref 0–0.2)
NT-PROBNP SERPL-MCNC: 151.5 PG/ML (ref 0–450)
PLATELET # BLD AUTO: 276 10*3/MM3 (ref 140–450)
PMV BLD AUTO: 10.9 FL (ref 6–12)
POTASSIUM SERPL-SCNC: 3.5 MMOL/L (ref 3.5–5.2)
PROT SERPL-MCNC: 7.3 G/DL (ref 6–8.5)
RBC # BLD AUTO: 4.29 10*6/MM3 (ref 3.77–5.28)
SODIUM SERPL-SCNC: 140 MMOL/L (ref 136–145)
TROPONIN T SERPL HS-MCNC: 6 NG/L
WBC NRBC COR # BLD AUTO: 7.52 10*3/MM3 (ref 3.4–10.8)
WHOLE BLOOD HOLD COAG: NORMAL
WHOLE BLOOD HOLD SPECIMEN: NORMAL

## 2025-06-10 PROCEDURE — 93005 ELECTROCARDIOGRAM TRACING: CPT

## 2025-06-10 PROCEDURE — 71045 X-RAY EXAM CHEST 1 VIEW: CPT

## 2025-06-10 PROCEDURE — 80053 COMPREHEN METABOLIC PANEL: CPT

## 2025-06-10 PROCEDURE — 36415 COLL VENOUS BLD VENIPUNCTURE: CPT

## 2025-06-10 PROCEDURE — 83880 ASSAY OF NATRIURETIC PEPTIDE: CPT

## 2025-06-10 PROCEDURE — 83605 ASSAY OF LACTIC ACID: CPT

## 2025-06-10 PROCEDURE — 93005 ELECTROCARDIOGRAM TRACING: CPT | Performed by: EMERGENCY MEDICINE

## 2025-06-10 PROCEDURE — 94799 UNLISTED PULMONARY SVC/PX: CPT

## 2025-06-10 PROCEDURE — 84484 ASSAY OF TROPONIN QUANT: CPT

## 2025-06-10 PROCEDURE — 93010 ELECTROCARDIOGRAM REPORT: CPT | Performed by: SPECIALIST

## 2025-06-10 PROCEDURE — 85025 COMPLETE CBC W/AUTO DIFF WBC: CPT

## 2025-06-10 PROCEDURE — 99285 EMERGENCY DEPT VISIT HI MDM: CPT

## 2025-06-10 PROCEDURE — 87040 BLOOD CULTURE FOR BACTERIA: CPT

## 2025-06-10 PROCEDURE — 94640 AIRWAY INHALATION TREATMENT: CPT

## 2025-06-10 RX ORDER — IPRATROPIUM BROMIDE AND ALBUTEROL SULFATE 2.5; .5 MG/3ML; MG/3ML
3 SOLUTION RESPIRATORY (INHALATION) ONCE
Status: COMPLETED | OUTPATIENT
Start: 2025-06-10 | End: 2025-06-10

## 2025-06-10 RX ORDER — SODIUM CHLORIDE 0.9 % (FLUSH) 0.9 %
10 SYRINGE (ML) INJECTION AS NEEDED
Status: DISCONTINUED | OUTPATIENT
Start: 2025-06-10 | End: 2025-06-12 | Stop reason: HOSPADM

## 2025-06-10 RX ADMIN — IPRATROPIUM BROMIDE AND ALBUTEROL SULFATE 3 ML: .5; 3 SOLUTION RESPIRATORY (INHALATION) at 23:43

## 2025-06-11 ENCOUNTER — HOSPITAL ENCOUNTER (OUTPATIENT)
Facility: HOSPITAL | Age: 45
Discharge: HOME | End: 2025-06-12
Attending: EMERGENCY MEDICINE | Admitting: INTERNAL MEDICINE
Payer: COMMERCIAL

## 2025-06-11 ENCOUNTER — APPOINTMENT (OUTPATIENT)
Dept: CT IMAGING | Facility: HOSPITAL | Age: 45
End: 2025-06-11
Payer: COMMERCIAL

## 2025-06-11 ENCOUNTER — APPOINTMENT (OUTPATIENT)
Dept: CARDIOLOGY | Facility: HOSPITAL | Age: 45
End: 2025-06-11
Payer: COMMERCIAL

## 2025-06-11 DIAGNOSIS — J45.41 MODERATE PERSISTENT ASTHMA WITH EXACERBATION: Primary | ICD-10-CM

## 2025-06-11 DIAGNOSIS — J96.01 ACUTE RESPIRATORY FAILURE WITH HYPOXIA: ICD-10-CM

## 2025-06-11 PROBLEM — J96.91 RESPIRATORY FAILURE WITH HYPOXIA: Status: ACTIVE | Noted: 2025-06-11

## 2025-06-11 PROBLEM — R06.00 DYSPNEA: Status: ACTIVE | Noted: 2025-06-11

## 2025-06-11 LAB
ALBUMIN SERPL-MCNC: 4.3 G/DL (ref 3.5–5.2)
ALBUMIN/GLOB SERPL: 1.2 G/DL
ALP SERPL-CCNC: 65 U/L (ref 39–117)
ALT SERPL W P-5'-P-CCNC: 21 U/L (ref 1–33)
ANION GAP SERPL CALCULATED.3IONS-SCNC: 14.6 MMOL/L (ref 5–15)
ARTERIAL PATENCY WRIST A: ABNORMAL
AST SERPL-CCNC: 19 U/L (ref 1–32)
ATMOSPHERIC PRESS: 746.6 MMHG
B-HCG UR QL: NEGATIVE
BASE EXCESS BLDA CALC-SCNC: 0.1 MMOL/L (ref -2–2)
BASOPHILS # BLD AUTO: 0.04 10*3/MM3 (ref 0–0.2)
BASOPHILS NFR BLD AUTO: 0.5 % (ref 0–1.5)
BDY SITE: ABNORMAL
BILIRUB SERPL-MCNC: 0.4 MG/DL (ref 0–1.2)
BUN SERPL-MCNC: 9.2 MG/DL (ref 6–20)
BUN/CREAT SERPL: 10.2 (ref 7–25)
CALCIUM SPEC-SCNC: 9.3 MG/DL (ref 8.6–10.5)
CHLORIDE SERPL-SCNC: 100 MMOL/L (ref 98–107)
CO2 SERPL-SCNC: 22.4 MMOL/L (ref 22–29)
CREAT SERPL-MCNC: 0.9 MG/DL (ref 0.57–1)
D DIMER PPP FEU-MCNC: 0.44 MCGFEU/ML (ref 0–0.5)
DEPRECATED RDW RBC AUTO: 39.5 FL (ref 37–54)
EGFRCR SERPLBLD CKD-EPI 2021: 80.5 ML/MIN/1.73
EOSINOPHIL # BLD AUTO: 0.06 10*3/MM3 (ref 0–0.4)
EOSINOPHIL NFR BLD AUTO: 0.7 % (ref 0.3–6.2)
ERYTHROCYTE [DISTWIDTH] IN BLOOD BY AUTOMATED COUNT: 12.3 % (ref 12.3–15.4)
GAS FLOW AIRWAY: 2 LPM
GEN 5 1HR TROPONIN T REFLEX: <6 NG/L
GLOBULIN UR ELPH-MCNC: 3.5 GM/DL
GLUCOSE SERPL-MCNC: 188 MG/DL (ref 65–99)
HCO3 BLDA-SCNC: 24.1 MMOL/L (ref 22–26)
HCT VFR BLD AUTO: 39.1 % (ref 34–46.6)
HCT VFR BLD CALC: 44 % (ref 38–51)
HEMODILUTION: NO
HGB BLD-MCNC: 12.1 G/DL (ref 12–15.9)
HGB BLDA-MCNC: 15 G/DL (ref 12–18)
IMM GRANULOCYTES # BLD AUTO: 0.04 10*3/MM3 (ref 0–0.05)
IMM GRANULOCYTES NFR BLD AUTO: 0.5 % (ref 0–0.5)
INHALED O2 CONCENTRATION: 28 %
LYMPHOCYTES # BLD AUTO: 0.85 10*3/MM3 (ref 0.7–3.1)
LYMPHOCYTES NFR BLD AUTO: 10.5 % (ref 19.6–45.3)
MCH RBC QN AUTO: 27.1 PG (ref 26.6–33)
MCHC RBC AUTO-ENTMCNC: 30.9 G/DL (ref 31.5–35.7)
MCV RBC AUTO: 87.7 FL (ref 79–97)
MODALITY: ABNORMAL
MONOCYTES # BLD AUTO: 0.17 10*3/MM3 (ref 0.1–0.9)
MONOCYTES NFR BLD AUTO: 2.1 % (ref 5–12)
NEUTROPHILS NFR BLD AUTO: 6.94 10*3/MM3 (ref 1.7–7)
NEUTROPHILS NFR BLD AUTO: 85.7 % (ref 42.7–76)
NRBC BLD AUTO-RTO: 0 /100 WBC (ref 0–0.2)
PCO2 BLDA: 36.3 MM HG (ref 35–45)
PH BLDA: 7.43 PH UNITS (ref 7.35–7.45)
PLATELET # BLD AUTO: 307 10*3/MM3 (ref 140–450)
PMV BLD AUTO: 11.1 FL (ref 6–12)
PO2 BLD: 273 MM[HG] (ref 0–500)
PO2 BLDA: 76.5 MM HG (ref 80–100)
POTASSIUM SERPL-SCNC: 3.8 MMOL/L (ref 3.5–5.2)
PROT SERPL-MCNC: 7.8 G/DL (ref 6–8.5)
QT INTERVAL: 365 MS
QTC INTERVAL: 471 MS
RBC # BLD AUTO: 4.46 10*6/MM3 (ref 3.77–5.28)
SAO2 % BLDCOA: 95.6 % (ref 95–99)
SODIUM SERPL-SCNC: 137 MMOL/L (ref 136–145)
TROPONIN T NUMERIC DELTA: NORMAL
WBC NRBC COR # BLD AUTO: 8.1 10*3/MM3 (ref 3.4–10.8)

## 2025-06-11 PROCEDURE — 84484 ASSAY OF TROPONIN QUANT: CPT | Performed by: EMERGENCY MEDICINE

## 2025-06-11 PROCEDURE — 96375 TX/PRO/DX INJ NEW DRUG ADDON: CPT

## 2025-06-11 PROCEDURE — 96372 THER/PROPH/DIAG INJ SC/IM: CPT

## 2025-06-11 PROCEDURE — 25010000002 METHYLPREDNISOLONE PER 125 MG: Performed by: EMERGENCY MEDICINE

## 2025-06-11 PROCEDURE — 94760 N-INVAS EAR/PLS OXIMETRY 1: CPT

## 2025-06-11 PROCEDURE — 25010000002 METHYLPREDNISOLONE PER 125 MG: Performed by: INTERNAL MEDICINE

## 2025-06-11 PROCEDURE — 25010000002 ENOXAPARIN PER 10 MG: Performed by: INTERNAL MEDICINE

## 2025-06-11 PROCEDURE — 25010000002 HYDRALAZINE PER 20 MG: Performed by: EMERGENCY MEDICINE

## 2025-06-11 PROCEDURE — 87040 BLOOD CULTURE FOR BACTERIA: CPT

## 2025-06-11 PROCEDURE — 71250 CT THORAX DX C-: CPT

## 2025-06-11 PROCEDURE — 94799 UNLISTED PULMONARY SVC/PX: CPT

## 2025-06-11 PROCEDURE — 96374 THER/PROPH/DIAG INJ IV PUSH: CPT

## 2025-06-11 PROCEDURE — 94664 DEMO&/EVAL PT USE INHALER: CPT

## 2025-06-11 PROCEDURE — 85379 FIBRIN DEGRADATION QUANT: CPT | Performed by: INTERNAL MEDICINE

## 2025-06-11 PROCEDURE — 85025 COMPLETE CBC W/AUTO DIFF WBC: CPT | Performed by: INTERNAL MEDICINE

## 2025-06-11 PROCEDURE — 81025 URINE PREGNANCY TEST: CPT | Performed by: INTERNAL MEDICINE

## 2025-06-11 PROCEDURE — 25010000002 KETOROLAC TROMETHAMINE PER 15 MG: Performed by: INTERNAL MEDICINE

## 2025-06-11 PROCEDURE — 25010000002 FUROSEMIDE PER 20 MG: Performed by: INTERNAL MEDICINE

## 2025-06-11 PROCEDURE — 36600 WITHDRAWAL OF ARTERIAL BLOOD: CPT | Performed by: INTERNAL MEDICINE

## 2025-06-11 PROCEDURE — 93306 TTE W/DOPPLER COMPLETE: CPT

## 2025-06-11 PROCEDURE — 82803 BLOOD GASES ANY COMBINATION: CPT | Performed by: INTERNAL MEDICINE

## 2025-06-11 PROCEDURE — 80053 COMPREHEN METABOLIC PANEL: CPT | Performed by: INTERNAL MEDICINE

## 2025-06-11 PROCEDURE — 93306 TTE W/DOPPLER COMPLETE: CPT | Performed by: INTERNAL MEDICINE

## 2025-06-11 PROCEDURE — 96376 TX/PRO/DX INJ SAME DRUG ADON: CPT

## 2025-06-11 RX ORDER — LOSARTAN POTASSIUM 50 MG/1
100 TABLET ORAL
Status: DISCONTINUED | OUTPATIENT
Start: 2025-06-12 | End: 2025-06-12 | Stop reason: HOSPADM

## 2025-06-11 RX ORDER — BISACODYL 5 MG/1
5 TABLET, DELAYED RELEASE ORAL DAILY PRN
Status: DISCONTINUED | OUTPATIENT
Start: 2025-06-11 | End: 2025-06-12 | Stop reason: HOSPADM

## 2025-06-11 RX ORDER — AMOXICILLIN 250 MG
2 CAPSULE ORAL 2 TIMES DAILY PRN
Status: DISCONTINUED | OUTPATIENT
Start: 2025-06-11 | End: 2025-06-12 | Stop reason: HOSPADM

## 2025-06-11 RX ORDER — HYDRALAZINE HYDROCHLORIDE 20 MG/ML
10 INJECTION INTRAMUSCULAR; INTRAVENOUS ONCE
Status: COMPLETED | OUTPATIENT
Start: 2025-06-11 | End: 2025-06-11

## 2025-06-11 RX ORDER — LISINOPRIL 20 MG/1
20 TABLET ORAL
Status: DISCONTINUED | OUTPATIENT
Start: 2025-06-11 | End: 2025-06-11

## 2025-06-11 RX ORDER — IPRATROPIUM BROMIDE AND ALBUTEROL SULFATE 2.5; .5 MG/3ML; MG/3ML
3 SOLUTION RESPIRATORY (INHALATION) ONCE
Status: COMPLETED | OUTPATIENT
Start: 2025-06-11 | End: 2025-06-11

## 2025-06-11 RX ORDER — METOPROLOL SUCCINATE 25 MG/1
25 TABLET, EXTENDED RELEASE ORAL
Status: DISCONTINUED | OUTPATIENT
Start: 2025-06-11 | End: 2025-06-12 | Stop reason: HOSPADM

## 2025-06-11 RX ORDER — ALPRAZOLAM 0.25 MG
0.25 TABLET ORAL EVERY 6 HOURS PRN
Status: DISCONTINUED | OUTPATIENT
Start: 2025-06-11 | End: 2025-06-12 | Stop reason: HOSPADM

## 2025-06-11 RX ORDER — PRENATAL VIT 91/IRON/FOLIC/DHA 28-975-200
1 COMBINATION PACKAGE (EA) ORAL 2 TIMES DAILY
COMMUNITY

## 2025-06-11 RX ORDER — BISACODYL 10 MG
10 SUPPOSITORY, RECTAL RECTAL DAILY PRN
Status: DISCONTINUED | OUTPATIENT
Start: 2025-06-11 | End: 2025-06-12 | Stop reason: HOSPADM

## 2025-06-11 RX ORDER — PANTOPRAZOLE SODIUM 40 MG/1
40 TABLET, DELAYED RELEASE ORAL DAILY
Status: DISCONTINUED | OUTPATIENT
Start: 2025-06-11 | End: 2025-06-12 | Stop reason: HOSPADM

## 2025-06-11 RX ORDER — IPRATROPIUM BROMIDE AND ALBUTEROL SULFATE 2.5; .5 MG/3ML; MG/3ML
3 SOLUTION RESPIRATORY (INHALATION) ONCE
Status: DISCONTINUED | OUTPATIENT
Start: 2025-06-11 | End: 2025-06-12 | Stop reason: HOSPADM

## 2025-06-11 RX ORDER — KETOROLAC TROMETHAMINE 30 MG/ML
15 INJECTION, SOLUTION INTRAMUSCULAR; INTRAVENOUS EVERY 6 HOURS PRN
Status: DISCONTINUED | OUTPATIENT
Start: 2025-06-11 | End: 2025-06-12 | Stop reason: HOSPADM

## 2025-06-11 RX ORDER — SODIUM CHLORIDE 0.9 % (FLUSH) 0.9 %
10 SYRINGE (ML) INJECTION EVERY 12 HOURS SCHEDULED
Status: DISCONTINUED | OUTPATIENT
Start: 2025-06-11 | End: 2025-06-12 | Stop reason: HOSPADM

## 2025-06-11 RX ORDER — FUROSEMIDE 20 MG/1
20 TABLET ORAL TAKE AS DIRECTED
COMMUNITY

## 2025-06-11 RX ORDER — IPRATROPIUM BROMIDE AND ALBUTEROL SULFATE 2.5; .5 MG/3ML; MG/3ML
3 SOLUTION RESPIRATORY (INHALATION)
Status: DISCONTINUED | OUTPATIENT
Start: 2025-06-11 | End: 2025-06-12 | Stop reason: HOSPADM

## 2025-06-11 RX ORDER — SODIUM CHLORIDE 0.9 % (FLUSH) 0.9 %
10 SYRINGE (ML) INJECTION AS NEEDED
Status: DISCONTINUED | OUTPATIENT
Start: 2025-06-11 | End: 2025-06-12 | Stop reason: HOSPADM

## 2025-06-11 RX ORDER — FUROSEMIDE 10 MG/ML
20 INJECTION INTRAMUSCULAR; INTRAVENOUS ONCE
Status: COMPLETED | OUTPATIENT
Start: 2025-06-11 | End: 2025-06-11

## 2025-06-11 RX ORDER — HYDROCHLOROTHIAZIDE 25 MG/1
12.5 TABLET ORAL DAILY
Status: DISCONTINUED | OUTPATIENT
Start: 2025-06-12 | End: 2025-06-12 | Stop reason: HOSPADM

## 2025-06-11 RX ORDER — HYDROCHLOROTHIAZIDE 25 MG/1
12.5 TABLET ORAL DAILY
Status: DISCONTINUED | OUTPATIENT
Start: 2025-06-11 | End: 2025-06-11

## 2025-06-11 RX ORDER — TRAZODONE HYDROCHLORIDE 50 MG/1
50 TABLET ORAL NIGHTLY
Status: DISCONTINUED | OUTPATIENT
Start: 2025-06-11 | End: 2025-06-11

## 2025-06-11 RX ORDER — POLYETHYLENE GLYCOL 3350 17 G/17G
17 POWDER, FOR SOLUTION ORAL DAILY PRN
Status: DISCONTINUED | OUTPATIENT
Start: 2025-06-11 | End: 2025-06-12 | Stop reason: HOSPADM

## 2025-06-11 RX ORDER — ALBUTEROL SULFATE 90 UG/1
2 INHALANT RESPIRATORY (INHALATION) 4 TIMES DAILY PRN
COMMUNITY
Start: 2025-05-28

## 2025-06-11 RX ORDER — ENOXAPARIN SODIUM 100 MG/ML
60 INJECTION SUBCUTANEOUS EVERY 12 HOURS
Status: DISCONTINUED | OUTPATIENT
Start: 2025-06-11 | End: 2025-06-12 | Stop reason: HOSPADM

## 2025-06-11 RX ORDER — ONDANSETRON 2 MG/ML
4 INJECTION INTRAMUSCULAR; INTRAVENOUS EVERY 4 HOURS PRN
Status: DISCONTINUED | OUTPATIENT
Start: 2025-06-11 | End: 2025-06-12 | Stop reason: HOSPADM

## 2025-06-11 RX ORDER — LOSARTAN POTASSIUM 50 MG/1
50 TABLET ORAL ONCE
Status: COMPLETED | OUTPATIENT
Start: 2025-06-11 | End: 2025-06-11

## 2025-06-11 RX ORDER — POTASSIUM CHLORIDE 750 MG/1
10 CAPSULE, EXTENDED RELEASE ORAL 2 TIMES DAILY
Status: ON HOLD | COMMUNITY
End: 2025-06-11

## 2025-06-11 RX ORDER — METHION/INOS/CHOL BT/B COM/LIV 110MG-86MG
1 CAPSULE ORAL EVERY 12 HOURS SCHEDULED
COMMUNITY
Start: 2025-05-24

## 2025-06-11 RX ORDER — FAMOTIDINE 20 MG/1
40 TABLET, FILM COATED ORAL DAILY
Status: DISCONTINUED | OUTPATIENT
Start: 2025-06-11 | End: 2025-06-11

## 2025-06-11 RX ORDER — LOSARTAN POTASSIUM 25 MG/1
25 TABLET ORAL
Status: DISCONTINUED | OUTPATIENT
Start: 2025-06-11 | End: 2025-06-11

## 2025-06-11 RX ORDER — ALUMINA, MAGNESIA, AND SIMETHICONE 2400; 2400; 240 MG/30ML; MG/30ML; MG/30ML
15 SUSPENSION ORAL EVERY 6 HOURS PRN
Status: DISCONTINUED | OUTPATIENT
Start: 2025-06-11 | End: 2025-06-12 | Stop reason: HOSPADM

## 2025-06-11 RX ORDER — GUAIFENESIN/DEXTROMETHORPHAN 100-10MG/5
5 SYRUP ORAL EVERY 4 HOURS PRN
Status: DISCONTINUED | OUTPATIENT
Start: 2025-06-11 | End: 2025-06-12 | Stop reason: HOSPADM

## 2025-06-11 RX ORDER — SODIUM CHLORIDE 9 MG/ML
40 INJECTION, SOLUTION INTRAVENOUS AS NEEDED
Status: DISCONTINUED | OUTPATIENT
Start: 2025-06-11 | End: 2025-06-12 | Stop reason: HOSPADM

## 2025-06-11 RX ORDER — POTASSIUM CHLORIDE 750 MG/1
10 TABLET, EXTENDED RELEASE ORAL DAILY PRN
COMMUNITY

## 2025-06-11 RX ORDER — HYDROCHLOROTHIAZIDE 25 MG/1
12.5 TABLET ORAL
Status: DISCONTINUED | OUTPATIENT
Start: 2025-06-11 | End: 2025-06-11

## 2025-06-11 RX ORDER — LOSARTAN POTASSIUM AND HYDROCHLOROTHIAZIDE 12.5; 1 MG/1; MG/1
1 TABLET ORAL DAILY
COMMUNITY

## 2025-06-11 RX ADMIN — ALPRAZOLAM 0.25 MG: 0.25 TABLET ORAL at 07:27

## 2025-06-11 RX ADMIN — IPRATROPIUM BROMIDE AND ALBUTEROL SULFATE 3 ML: .5; 3 SOLUTION RESPIRATORY (INHALATION) at 03:39

## 2025-06-11 RX ADMIN — PANTOPRAZOLE SODIUM 40 MG: 40 TABLET, DELAYED RELEASE ORAL at 15:52

## 2025-06-11 RX ADMIN — METHYLPREDNISOLONE SODIUM SUCCINATE 80 MG: 125 INJECTION, POWDER, LYOPHILIZED, FOR SOLUTION INTRAMUSCULAR; INTRAVENOUS at 04:55

## 2025-06-11 RX ADMIN — Medication 10 ML: at 20:26

## 2025-06-11 RX ADMIN — METOPROLOL SUCCINATE ER TABLETS 25 MG: 25 TABLET, FILM COATED, EXTENDED RELEASE ORAL at 20:26

## 2025-06-11 RX ADMIN — METHYLPREDNISOLONE SODIUM SUCCINATE 125 MG: 125 INJECTION, POWDER, LYOPHILIZED, FOR SOLUTION INTRAMUSCULAR; INTRAVENOUS at 01:50

## 2025-06-11 RX ADMIN — IPRATROPIUM BROMIDE AND ALBUTEROL SULFATE 3 ML: .5; 3 SOLUTION RESPIRATORY (INHALATION) at 11:26

## 2025-06-11 RX ADMIN — ENOXAPARIN SODIUM 60 MG: 100 INJECTION SUBCUTANEOUS at 15:52

## 2025-06-11 RX ADMIN — ENOXAPARIN SODIUM 60 MG: 100 INJECTION SUBCUTANEOUS at 04:54

## 2025-06-11 RX ADMIN — IPRATROPIUM BROMIDE AND ALBUTEROL SULFATE 3 ML: .5; 3 SOLUTION RESPIRATORY (INHALATION) at 15:58

## 2025-06-11 RX ADMIN — METHYLPREDNISOLONE SODIUM SUCCINATE 40 MG: 125 INJECTION, POWDER, LYOPHILIZED, FOR SOLUTION INTRAMUSCULAR; INTRAVENOUS at 20:26

## 2025-06-11 RX ADMIN — HYDROCHLOROTHIAZIDE 12.5 MG: 25 TABLET ORAL at 12:48

## 2025-06-11 RX ADMIN — LOSARTAN POTASSIUM 50 MG: 50 TABLET, FILM COATED ORAL at 15:52

## 2025-06-11 RX ADMIN — IPRATROPIUM BROMIDE AND ALBUTEROL SULFATE 3 ML: .5; 3 SOLUTION RESPIRATORY (INHALATION) at 01:35

## 2025-06-11 RX ADMIN — HYDRALAZINE HYDROCHLORIDE 10 MG: 20 INJECTION INTRAMUSCULAR; INTRAVENOUS at 04:11

## 2025-06-11 RX ADMIN — FUROSEMIDE 20 MG: 10 INJECTION, SOLUTION INTRAMUSCULAR; INTRAVENOUS at 15:51

## 2025-06-11 RX ADMIN — METHYLPREDNISOLONE SODIUM SUCCINATE 80 MG: 125 INJECTION, POWDER, LYOPHILIZED, FOR SOLUTION INTRAMUSCULAR; INTRAVENOUS at 11:30

## 2025-06-11 RX ADMIN — Medication 10 ML: at 11:30

## 2025-06-11 RX ADMIN — KETOROLAC TROMETHAMINE 15 MG: 30 INJECTION, SOLUTION INTRAMUSCULAR; INTRAVENOUS at 20:48

## 2025-06-11 RX ADMIN — KETOROLAC TROMETHAMINE 15 MG: 30 INJECTION, SOLUTION INTRAMUSCULAR; INTRAVENOUS at 10:29

## 2025-06-11 RX ADMIN — IPRATROPIUM BROMIDE AND ALBUTEROL SULFATE 3 ML: .5; 3 SOLUTION RESPIRATORY (INHALATION) at 06:51

## 2025-06-11 RX ADMIN — FAMOTIDINE 40 MG: 20 TABLET, FILM COATED ORAL at 10:17

## 2025-06-11 RX ADMIN — IPRATROPIUM BROMIDE AND ALBUTEROL SULFATE 3 ML: .5; 3 SOLUTION RESPIRATORY (INHALATION) at 20:06

## 2025-06-11 RX ADMIN — LOSARTAN POTASSIUM 25 MG: 25 TABLET, FILM COATED ORAL at 12:48

## 2025-06-11 RX ADMIN — Medication 10 ML: at 10:18

## 2025-06-11 NOTE — PLAN OF CARE
Problem: Adult Inpatient Plan of Care  Goal: Plan of Care Review  Outcome: Progressing  Flowsheets (Taken 6/11/2025 1216)  Outcome Evaluation: Patient is alert and oriented. On oxygen 2 liters per nasal cannula without signs of symptoms of distress, does get short of air with activity. Breath sounds clear bilateral.  Patient does have anxiety and had a headache but treated with prn meds and no complaints since. Patient is on Lovenox injection. Activity standby assist. CT completed this shift, see results  Plan of Care Reviewed With: patient   Goal Outcome Evaluation:  Plan of Care Reviewed With: patient           Outcome Evaluation: Patient is alert and oriented. On oxygen 2 liters per nasal cannula without signs of symptoms of distress, does get short of air with activity. Breath sounds clear bilateral.  Patient does have anxiety and had a headache but treated with prn meds and no complaints since. Patient is on Lovenox injection. Activity standby assist. CT completed this shift, see results

## 2025-06-11 NOTE — H&P
[facility]   HISTORY AND PHYSICAL    Patient Name: Yancy Hernandez  : 1980  MRN: [id number]  Primary Care Physician:  Emmy Díaz MD  Date of admission: 2025    Subjective   Subjective     Chief Complaint:   Shortness of breath      HPI:    Yancy Hernandez is a 45 y.o. female came to ER with sudden onset of shortness of breath started at night prior to coming to ER.  Patient denies any chest pain denies any coughing up blood.  Patient was seen and evaluated in ER recommended admission to hospital.  Further when I saw patient she is very anxious.  Complains of aches and pains      Review of Systems:    No chest pain.  Shortness of breath.  Exertional dyspnea.  Swelling of legs.  No calf pain or tenderness.  Wheezing.  No fever chills    Personal History     Past Medical History:   Diagnosis Date   • Arthritis    • Carpal tunnel syndrome    • Depression     HX   • Elevated triglycerides with high cholesterol    • GERD (gastroesophageal reflux disease)    • Helicobacter pylori infection 2015   • Hiatal hernia    • History of anemia    • History of blood clots     LT LEG, COUPLE YEARS AGO, TOOK ELIQUIS   • History of COVID-19    • Hyperlipidemia    • Hypertension    • Knee pain, bilateral    • Migraine    • OAB (overactive bladder)    • Obesity    • Sleep apnea     CPAP       Past Surgical History:   Procedure Laterality Date   • BILATERAL BREAST REDUCTION     • CARPAL TUNNEL RELEASE Left    • DENTAL PROCEDURE     • DILATATION AND CURETTAGE     • ENDOMETRIAL ABLATION     • ENDOSCOPY N/A 10/18/2022    Procedure: ESOPHAGOGASTRODUODENOSCOPY WITH BIOPSY;  Surgeon: Kevin Fuentes Jr., MD;  Location: [facility] ENDOSCOPY;  Service: General;  Laterality: N/A;  PRE- GERD  POST- GASTRITIS   • ENDOSCOPY N/A 2025    Procedure: ESOPHAGOGASTRODUODENOSCOPY WITH BIOPSY;  Surgeon: Kevin Fuentes Jr., MD;  Location: [facility] ENDOSCOPY;  Service: General;  Laterality: N/A;  PRE- GERD, H/O  SLEEVE  POST- GASTRITIS, ESOPHAGITIS   • GASTRIC SLEEVE LAPAROSCOPIC N/A 12/21/2022    Procedure: GASTRIC SLEEVE LAPAROSCOPIC WITH ROBOTIC, PARAESPHAGEAL HERRNIA REPAIR, LYSIS OF ADHESIONS;  Surgeon: Kevin Fuentes Jr., MD;  [place] [facility] [place] [facility] [place]  [facility];  Laterality: N/A;   • LAPAROSCOPIC CHOLECYSTECTOMY     • TONSILLECTOMY     • TUBAL ABDOMINAL LIGATION         Family History: family history includes Hypertension in her father, mother, and sister; Obesity in her mother; Sleep apnea in her father and sister. Otherwise pertinent FHx was reviewed and not pertinent to current issue.    Social History:  reports that she has never smoked. She has never used smokeless tobacco. She reports current alcohol use of about 1.0 standard drink of alcohol per week. She reports that she does not use drugs.    Home Medications:  albuterol sulfate HFA, cholecalciferol, furosemide, losartan-hydrochlorothiazide, multivitamin with minerals, pantoprazole, potassium chloride, terbinafine, and thiamine      Allergies:  Allergies   Allergen Reactions   • Hydrocodone-Acetaminophen Hallucinations   • Codeine Other (See Comments)     Cold sweats   • Lisinopril Cough     Migraine- pt reports MD Jett instructed her to no longer take this       Objective   Objective     Vitals:   Temp:  [97.9 °F (36.6 °C)-98.8 °F (37.1 °C)] 98.4 °F (36.9 °C)  Heart Rate:  [] 103  Resp:  [18-28] 18  BP: (146-200)/(100-132) 163/104  Flow (L/min) (Oxygen Therapy):  [1-2] 2    Physical Exam    Morbidly obese female not in acute distress.  Neck obese difficult evaluate.  Heart regular distant sounds.  Lungs diminished breath sounds not much of air movement.  Abdomen extremely obese unable to palpate.  Extremities with edema no calf tenderness.      I have personally reviewed the results from the time of this admission to 6/11/2025 14:46 EDT and agree with these findings:  [x]  Laboratory  [x]  Microbiology  [x]  Radiology  [x]   EKG/Telemetry   []  Cardiology/Vascular   []  Pathology  []  Old records  []  Other:    CBC:    WBC   Date Value Ref Range Status   06/11/2025 8.10 3.40 - 10.80 10*3/mm3 Final     RBC   Date Value Ref Range Status   06/11/2025 4.46 3.77 - 5.28 10*6/mm3 Final     Hemoglobin   Date Value Ref Range Status   06/11/2025 12.1 12.0 - 15.9 g/dL Final     Hematocrit   Date Value Ref Range Status   06/11/2025 39.1 34.0 - 46.6 % Final     MCV   Date Value Ref Range Status   06/11/2025 87.7 79.0 - 97.0 fL Final     MCH   Date Value Ref Range Status   06/11/2025 27.1 26.6 - 33.0 pg Final     MCHC   Date Value Ref Range Status   06/11/2025 30.9 (L) 31.5 - 35.7 g/dL Final     RDW   Date Value Ref Range Status   06/11/2025 12.3 12.3 - 15.4 % Final     RDW-SD   Date Value Ref Range Status   06/11/2025 39.5 37.0 - 54.0 fl Final     MPV   Date Value Ref Range Status   06/11/2025 11.1 6.0 - 12.0 fL Final     Platelets   Date Value Ref Range Status   06/11/2025 307 140 - 450 10*3/mm3 Final     Neutrophil %   Date Value Ref Range Status   06/11/2025 85.7 (H) 42.7 - 76.0 % Final     Lymphocyte %   Date Value Ref Range Status   06/11/2025 10.5 (L) 19.6 - 45.3 % Final     Monocyte %   Date Value Ref Range Status   06/11/2025 2.1 (L) 5.0 - 12.0 % Final     Eosinophil %   Date Value Ref Range Status   06/11/2025 0.7 0.3 - 6.2 % Final     Basophil %   Date Value Ref Range Status   06/11/2025 0.5 0.0 - 1.5 % Final     Immature Grans %   Date Value Ref Range Status   06/11/2025 0.5 0.0 - 0.5 % Final     Neutrophils, Absolute   Date Value Ref Range Status   06/11/2025 6.94 1.70 - 7.00 10*3/mm3 Final     Lymphocytes, Absolute   Date Value Ref Range Status   06/11/2025 0.85 0.70 - 3.10 10*3/mm3 Final     Monocytes, Absolute   Date Value Ref Range Status   06/11/2025 0.17 0.10 - 0.90 10*3/mm3 Final     Eosinophils, Absolute   Date Value Ref Range Status   06/11/2025 0.06 0.00 - 0.40 10*3/mm3 Final     Basophils, Absolute   Date Value Ref Range  Status   06/11/2025 0.04 0.00 - 0.20 10*3/mm3 Final     Immature Grans, Absolute   Date Value Ref Range Status   06/11/2025 0.04 0.00 - 0.05 10*3/mm3 Final     nRBC   Date Value Ref Range Status   06/11/2025 0.0 0.0 - 0.2 /100 WBC Final        BMP:    Lab Results   Component Value Date    GLUCOSE 188 (H) 06/11/2025    BUN 9.2 06/11/2025    CREATININE 0.90 06/11/2025    BCR 10.2 06/11/2025    K 3.8 06/11/2025    CO2 22.4 06/11/2025    CALCIUM 9.3 06/11/2025    ALBUMIN 4.3 06/11/2025    AST 19 06/11/2025    ALT 21 06/11/2025        XR Chest 1 View  Result Date: 6/10/2025  No active disease. Electronically Signed: Moise Rasmussen MD  6/10/2025 10:48 PM EDT  Workstation ID: [id number]             Assessment & Plan   Assessment / Plan       Current Diagnosis:  Active Hospital Problems    Diagnosis    • **Respiratory failure with hypoxia    • Dyspnea    • HORTENCIA (obstructive sleep apnea)    • Obesity, morbid, BMI 50 or higher      Plan:   Patient seen in ER and diagnosed with acute respiratory failure with hypoxia.  No ABGs available.  At this point we will proceed with ABGs.  Started on nebs and steroids.  Will taper steroids.  Will use IV Lasix one-time.  Check echo.  Check a CT chest.  Check D-dimer.  Pulmonary consult.  Further management will based on clinical course      VTE Prophylaxis:  Pharmacologic VTE prophylaxis orders are present.        GI Prophylaxis:       Pepcid/Protonix    CODE STATUS:    Code Status (Patient has no pulse and is not breathing): CPR (Attempt to Resuscitate)  Medical Interventions (Patient has pulse or is breathing): Full Support    Admission Status:  I believe this patient meets inpatient status.             I have dictated this note utilizing Dragon Dictation.             Please note that portions of this note were completed with a voice recognition program.             Part of this note may be an electronic transcription/translation of spoken language to printed text         using the Dragon  Dictation System.       Electronically signed by Jose Jaramillo MD, 06/11/25, 2:43 PM EDT.    Total time spent with in evaluation and management:

## 2025-06-11 NOTE — CONSULTS
[facility]   Consult Note    Patient Name: Yancy Hernandez  : 1980  MRN: [id number]  Primary Care Physician: Emmy Díaz MD  Referring Physician: No ref. provider found  Date of admission: 2025    Subjective   Subjective     Reason for Consult/ Chief Complaint: Shortness of breath    HPI:  Yancy Hernandez is a 45 y.o. female who used to follow with me for her obstructive sleep apnea.  She is a lifetime non-smoker and was evaluated with nocturnal polysomnography done on 3/12/2024 and was found to have AHI of 11 and RDI of 13.  She had significant symptoms of snoring snorting and having hypoxemia with lowest oxygen saturation 88% for 7 minutes.  She was started on auto CPAP between 8 and 16 cm of H2O which according to her she was using regularly.  She started developing shortness of breath after coming from her work she was not having any chest pain or chest discomfort with breathing but was shortness of air was significant and she was unable to perform her activities of daily living.  When she presented initially she was anxious and was complaining of aches and pains.  Her shortness of breath started with associated wheezing at 2 in the morning she was using her inhalers but was not feeling any relief so finally she decided to come to the emergency department.  When she presented she was having audible wheezes and she was significantly tachypneic.  It is important to note that she had a gastric sleeve operation done about a year ago and developed gastroesophageal reflux symptoms after the sleeve gastrectomy which according to her has resulted into development of Rashid's esophagus now.  Her symptoms continued with tiredness and excessive daytime sleepiness.  Currently she is taking Protonix for her gastroesophageal reflux symptoms and is currently being considered for Ronnie-en-Y gastric bypass surgery as well.  She has history of noncompliance with follow-up she has not followed with  me since April 2024 and before that she did not follow-up with me since 2018.  There is a history of noncompliance with the CPAP as well.  She states that her CPAP is available at home and she is using it regularly and I have advised her to get her CPAP here in the hospital so that we can start using it.    Review of Systems   All systems were reviewed and negative except for: Shortness of breath on exertion  Developing edema  And developing gastroesophageal reflux disease after having sleeve gastrectomy, currently being considered for Ronnie-en-Y surgery    Personal History     Past Medical History:   Diagnosis Date   • Arthritis    • Carpal tunnel syndrome    • Depression     HX   • Elevated triglycerides with high cholesterol    • GERD (gastroesophageal reflux disease)    • Helicobacter pylori infection 11/27/2015   • Hiatal hernia    • History of anemia    • History of blood clots     LT LEG, COUPLE YEARS AGO, TOOK ELIQUIS   • History of COVID-19 2020   • Hyperlipidemia    • Hypertension    • Knee pain, bilateral    • Migraine    • OAB (overactive bladder)    • Obesity    • Sleep apnea     CPAP       Past Surgical History:   Procedure Laterality Date   • BILATERAL BREAST REDUCTION     • CARPAL TUNNEL RELEASE Left    • DENTAL PROCEDURE     • DILATATION AND CURETTAGE     • ENDOMETRIAL ABLATION     • ENDOSCOPY N/A 10/18/2022    Procedure: ESOPHAGOGASTRODUODENOSCOPY WITH BIOPSY;  Surgeon: Kevin Fuentes Jr., MD;  Location: [facility] ENDOSCOPY;  Service: General;  Laterality: N/A;  PRE- GERD  POST- GASTRITIS   • ENDOSCOPY N/A 5/20/2025    Procedure: ESOPHAGOGASTRODUODENOSCOPY WITH BIOPSY;  Surgeon: Kevin Fuentes Jr., MD;  Location: [facility] ENDOSCOPY;  Service: General;  Laterality: N/A;  PRE- GERD, H/O SLEEVE  POST- GASTRITIS, ESOPHAGITIS   • GASTRIC SLEEVE LAPAROSCOPIC N/A 12/21/2022    Procedure: GASTRIC SLEEVE LAPAROSCOPIC WITH ROBOTIC, PARAESPHAGEAL HERRNIA REPAIR, LYSIS OF ADHESIONS;  Surgeon: Kevin Fuentes  Les Mitchell MD;  [place] [facility] [place] [facility] [place]  Service: Robotics - DaVinci;  Laterality: N/A;   • LAPAROSCOPIC CHOLECYSTECTOMY     • TONSILLECTOMY     • TUBAL ABDOMINAL LIGATION         Family History: family history includes Hypertension in her father, mother, and sister; Obesity in her mother; Sleep apnea in her father and sister. Otherwise pertinent FHx was reviewed and not pertinent to current issue.    Social History:  reports that she has never smoked. She has never used smokeless tobacco. She reports current alcohol use of about 1.0 standard drink of alcohol per week. She reports that she does not use drugs.    Home Medications:  albuterol sulfate HFA, cholecalciferol, furosemide, losartan-hydrochlorothiazide, multivitamin with minerals, pantoprazole, potassium chloride, terbinafine, and thiamine      Allergies:  Allergies   Allergen Reactions   • Hydrocodone-Acetaminophen Hallucinations   • Codeine Other (See Comments)     Cold sweats   • Lisinopril Cough     Migraine- pt reports MD Jett instructed her to no longer take this       Objective    Objective   Vitals:  Temp:  [97.9 °F (36.6 °C)-98.8 °F (37.1 °C)] 98.4 °F (36.9 °C)  Heart Rate:  [] 103  Resp:  [18-28] 18  BP: (146-200)/(100-132) 163/104  Flow (L/min) (Oxygen Therapy):  [1-2] 2    Physical Exam:   Constitutional: Awake, alert, with obesity class V BMI of 61.49   Eyes: PERRLA, sclerae anicteric, no conjunctival injection   HENT: NCAT, mucous membranes moist   Neck: Supple, no thyromegaly, no lymphadenopathy, trachea midline   Respiratory: Clear to auscultation bilaterally, nonlabored respirations    Cardiovascular: RRR, no murmurs, rubs, or gallops, palpable pedal pulses bilaterally   Gastrointestinal: Positive bowel sounds, soft, nontender, nondistended   Musculoskeletal: Has bilateral ankle edema, no clubbing or cyanosis to extremities   Psychiatric: Appropriate affect, cooperative   Neurologic: Oriented x 3, strength symmetric in all  "extremities, Cranial Nerves grossly intact to confrontation, speech clear   Skin: No rashes         Result Review    Result Review:  I have personally reviewed the results from the time of this admission to 06/11/25 3:30 PM EDT and agree with these findings:  [x]  Laboratory  [x]  Microbiology  [x]  Radiology  []  EKG/Telemetry   []  Cardiology/Vascular   []  Pathology  []  Old records  []  Other:  Most notable findings include: Normal CO2 on electrolytes    Results from last 7 days   Lab Units 06/11/25  0508 06/10/25  2212   WBC 10*3/mm3 8.10 7.52   HEMOGLOBIN g/dL 12.1 11.7*   HEMATOCRIT % 39.1 37.8   PLATELETS 10*3/mm3 307 276     Results from last 7 days   Lab Units 06/11/25  0508 06/10/25  2212   SODIUM mmol/L 137 140   POTASSIUM mmol/L 3.8 3.5   CHLORIDE mmol/L 100 103   CO2 mmol/L 22.4 24.8   BUN mg/dL 9.2 10.4   CREATININE mg/dL 0.90 1.00   CALCIUM mg/dL 9.3 9.1   BILIRUBIN mg/dL 0.4 0.3   ALK PHOS U/L 65 62   ALT (SGPT) U/L 21 19   AST (SGOT) U/L 19 19   GLUCOSE mg/dL 188* 125*     No results for input(s): \"PHART\", \"OPP5TWA\", \"PO2ART\", \"HRB7RUQ\", \"BASEEXCESS\" in the last 8760 hours.    XR Chest 1 View  Result Date: 6/10/2025  No active disease. Electronically Signed: Moise Rasmussen MD  6/10/2025 10:48 PM EDT  Workstation ID: [id number]       Assessment & Plan   Assessment / Plan     Brief Patient Summary:  Yancy Hernandez is a 45 y.o. female who is admitted because of the worsening shortness of breath with associated wheezing.  Her symptoms started recently few days ago and were getting worse.  She took her inhalers but did not improve.  Sinew to have wheezing shortness of breath and presented to emergency department.  She has been started on ipratropium albuterol nebulizer treatment.  Has a been started on diuretic therapy.  I am considering to do an ABG on her and start her on the CPAP.  She has been advised to get her CPAP machine from her home and start using it.  At this time her lungs are sounding " vesicular and I did not hear any significant wheezing or crackles.  Chest x-ray is also negative.  Electrolytes are normal CBC is showing hemoglobin of 11.7 WBC count is normal, lungs are clear on the chest x-ray no active disease.  EKG with sinus tachycardia.  Active Hospital Problems:  Active Hospital Problems    Diagnosis    • **Respiratory failure with hypoxia    • Dyspnea    • HORTENCIA (obstructive sleep apnea)    • Obesity, morbid, BMI 50 or higher        Plan:   I agree with the current management.  Continue the nebulizer treatment regularly.  Continue the CPAP when she gets it from her home.  Currently has blood pressure 163/104 O2 sat is 98% on 2 L.  She is a lifetime non-smoker.  Mental status is normal does not seem to have any hypercapnia at this time, with associated CO2 22.4 on electrolytes.  Has been started on enoxaparin for DVT prophylaxis.  DuoNeb is continued.  She has also been started on methylprednisolone 80 mg IV every 8 hour.  Would recommend to decrease the dose to 40 mg every 8 hours.  Pantoprazole is appropriate with a history of gastroesophageal reflux disease with associated Rashid esophagus.  Discussed with the patient that her gastroesophageal reflux is possibly secondary to having sleeve gastrectomy and Ronnie-en-Y surgery would be most appropriate for her since she is not losing significant weight after sleeve gastrectomy.  She told me that she has been considered for it and she is in touch with her bariatric surgeon Dr. Fuentes.  Thank you very much Dr. ELEAZAR Jaramillo for asking me to see you nice patient I will follow her with you thank you      Electronically signed by Rey Hammond MD, 06/11/25, 3:30 PM EDT.    Part of this note may be an electronic transcription/translation of spoken language to printed text using the Dragon Dictation System.

## 2025-06-11 NOTE — NURSING NOTE
..Patient Yancy Hernandez admitted to 4MTU from home or the ED. Patient is alert and oriented to person, place, time, and situation. Patient oriented to unit, call light system and bed alarm use, teaching effective. Patient agreed to bed alarm use. Candida Auris screening revealed patient will NOT need tested, contact isolation and bleach cleaning due to no risk factors. Patient currently is not a concern for an active CDIFF infection.    4 eyes skin assessment completed with Chelita Sarabia RN. Per policy, the following skin interventions were put in place as a result of the skin assessment below: None - William is greater than 18 and no skin issues noted    Skin Assessments (most recent)      Flowsheet Row Most Recent Value   Sensory Perception 4-->no impairment   Moisture 4-->rarely moist   Activity 3-->walks occasionally   Mobility 3-->slightly limited   Nutrition 4-->excellent   Friction and Shear 3-->no apparent problem   William Score 21            Fall assessment completed. Per policy, the patient was determined be a Low fall risk due to Younger Ruben score 14 or less (only used within the first 24 hours of admission). Patient assessed to need the following mobility assistance: Standby Assist with the following assistive devices: None, and will be using the bathroom for toileting. Per policy, the following fall interventions were put in place: Standard Fall Precautions - oriented to room and call light, bed low and locked, clutter free environment, adequate lighting, directed to call for assistance, non-skid footwear, hourly rounding and personal belongings within reach. and Room close to nurse's station.     Patient's belongings that were sent with family: None  Patient's belongings that were sent to security: None  Patient's belongings locked in safe box in room: None  Patient's belongings that were sent to pharmacy: Home Medications  Patient's belongings kept at bedside per patient: Purse/Wallet,  Glasses, Clothing, and Other: laptop + cellphone

## 2025-06-11 NOTE — ED PROVIDER NOTES
Time: 9:57 PM EDT  Date of encounter:  6/10/2025  Independent Historian/Clinical History and Information was obtained by:   Patient    History is limited by: N/A    Chief Complaint   Patient presents with    Shortness of Breath    Cough         History of Present Illness:  Patient is a 45 y.o. year old female who presents to the emergency department for evaluation of shortness of breath and wheezing since 2 AM this morning.  Patient has been using her inhaler without relief of symptoms.  She has audible wheezes and is tachypneic in triage.  SCOTT Tellez    Patient Care Team  Primary Care Provider: Emmy Díaz MD    Past Medical History:     Allergies   Allergen Reactions    Hydrocodone-Acetaminophen Hallucinations    Codeine Other (See Comments)     Cold sweats     Past Medical History:   Diagnosis Date    Arthritis     Carpal tunnel syndrome     Depression     HX    Elevated triglycerides with high cholesterol     GERD (gastroesophageal reflux disease)     Helicobacter pylori infection 11/27/2015    Hiatal hernia     History of anemia     History of blood clots     LT LEG, COUPLE YEARS AGO, TOOK ELIQUIS    History of COVID-19 2020    Hyperlipidemia     Hypertension     Knee pain, bilateral     Migraine     OAB (overactive bladder)     Obesity     Sleep apnea     CPAP     Past Surgical History:   Procedure Laterality Date    BILATERAL BREAST REDUCTION      CARPAL TUNNEL RELEASE Left     DENTAL PROCEDURE      DILATATION AND CURETTAGE      ENDOMETRIAL ABLATION      ENDOSCOPY N/A 10/18/2022    Procedure: ESOPHAGOGASTRODUODENOSCOPY WITH BIOPSY;  Surgeon: Kevin Fuentes Jr., MD;  Location: [facility] ENDOSCOPY;  Service: General;  Laterality: N/A;  PRE- GERD  POST- GASTRITIS    ENDOSCOPY N/A 5/20/2025    Procedure: ESOPHAGOGASTRODUODENOSCOPY WITH BIOPSY;  Surgeon: Kevin uFentes Jr., MD;  Location: [facility] ENDOSCOPY;  Service: General;  Laterality: N/A;  PRE- GERD, H/O SLEEVE  POST- GASTRITIS, ESOPHAGITIS     GASTRIC SLEEVE LAPAROSCOPIC N/A 12/21/2022    Procedure: GASTRIC SLEEVE LAPAROSCOPIC WITH ROBOTIC, PARAESPHAGEAL HERRNIA REPAIR, LYSIS OF ADHESIONS;  Surgeon: Kevin Fuentes Jr., MD;  Location: [facility] OR [facility] [place];  Service: Robotics - DaVinci;  Laterality: N/A;    LAPAROSCOPIC CHOLECYSTECTOMY      TONSILLECTOMY      TUBAL ABDOMINAL LIGATION       Family History   Problem Relation Age of Onset    Obesity Mother     Hypertension Mother     Hypertension Father     Sleep apnea Father     Sleep apnea Sister     Hypertension Sister     Malig Hyperthermia Neg Hx        Home Medications:  Prior to Admission medications    Medication Sig Start Date End Date Taking? Authorizing Provider   benazepril-hydrochlorthiazide (LOTENSIN HCT) 20-25 MG per tablet Take 1 tablet by mouth Daily. HOLD PER MD INSTR-HOLD THE NIGHT BEFORE SURGERY. 7/2/24   Douglas Cedillo MD   cholecalciferol (VITAMIN D3) 1.25 MG (99120 UT) capsule Take 1 capsule by mouth Daily.    ProviderDouglas MD   lansoprazole (Prevacid) 30 MG capsule Take 1 capsule by mouth 2 (Two) Times a Day. 5/23/25   Kevin Fuentes Jr., MD   multivitamin with minerals tablet tablet Take 1 tablet by mouth Daily. HOLD PER MD INSTR    ProviderDouglas MD   pantoprazole (PROTONIX) 40 MG EC tablet Take 1 tablet by mouth Daily. 5/22/25   Kevin Fuentes Jr., MD   Semaglutide-Weight Management 0.5 MG/0.5ML solution auto-injector Inject 0.5 mL under the skin into the appropriate area as directed 1 (One) Time Per Week. Inject 0.5mg or 50units under the skin into the appropriate area as directed 1 (One) time per week Semaglutide 1mg/1ml  Patient taking differently: Inject 0.5 mL under the skin into the appropriate area as directed 1 (One) Time Per Week. Inject 0.5mg or 50units under the skin into the appropriate area as directed 1 (One) time per week Semaglutide 1mg/1ml  HOLD PER MD INSTR-LAST DOSE 5/10/25. 1/10/25   Kevin Fuentes Jr., MD   traZODone (DESYREL)  "50 MG tablet TAKE 1/2 TO 1 TABLET BY MOUTH EVERY NIGHT AT BEDTIME AS NEEDED 1/18/24   Provider, MD Douglas        Social History:   Social History     Tobacco Use    Smoking status: Never    Smokeless tobacco: Never   Vaping Use    Vaping status: Never Used   Substance Use Topics    Alcohol use: Yes     Alcohol/week: 1.0 standard drink of alcohol     Types: 1 Glasses of wine per week     Comment: rare    Drug use: Never         Review of Systems:  Review of Systems   Constitutional:  Negative for chills and fever.   HENT:  Negative for congestion, ear pain and sore throat.    Eyes:  Negative for pain.   Respiratory:  Positive for cough, shortness of breath and wheezing. Negative for chest tightness.    Cardiovascular:  Negative for chest pain.   Gastrointestinal:  Negative for abdominal pain, diarrhea, nausea and vomiting.   Genitourinary:  Negative for flank pain and hematuria.   Musculoskeletal:  Negative for joint swelling.   Skin:  Negative for pallor.   Neurological:  Negative for seizures and headaches.   All other systems reviewed and are negative.       Physical Exam:  BP (!) 191/132   Pulse 78   Temp 98.8 °F (37.1 °C) (Oral)   Resp 18   Ht 157.5 cm (62\")   Wt (!) 149 kg (328 lb 0.7 oz)   SpO2 92%   BMI 60.00 kg/m²         Physical Exam  Constitutional:       Appearance: Normal appearance. She is morbidly obese.   HENT:      Head: Normocephalic and atraumatic.      Nose: Nose normal.      Mouth/Throat:      Mouth: Mucous membranes are moist.   Eyes:      Extraocular Movements: Extraocular movements intact.      Conjunctiva/sclera: Conjunctivae normal.      Pupils: Pupils are equal, round, and reactive to light.   Cardiovascular:      Rate and Rhythm: Normal rate and regular rhythm.      Pulses: Normal pulses.      Heart sounds: Normal heart sounds.   Pulmonary:      Effort: Tachypnea and respiratory distress (Mild) present.      Breath sounds: Wheezing present.   Abdominal:      General: There is " no distension.      Palpations: Abdomen is soft.      Tenderness: There is no abdominal tenderness.   Musculoskeletal:         General: Normal range of motion.      Cervical back: Normal range of motion and neck supple.   Skin:     General: Skin is warm and dry.      Capillary Refill: Capillary refill takes less than 2 seconds.   Neurological:      General: No focal deficit present.      Mental Status: She is alert and oriented to person, place, and time. Mental status is at baseline.   Psychiatric:         Mood and Affect: Mood normal.         Behavior: Behavior normal.                            Medical Decision Making:      Comorbidities that affect care:    Hypertension, Obesity    External Notes reviewed:    Previous Clinic Note: Patient was seen by gynecology on 5/30/2025 for well female exam with routine gynecological exam.      The following orders were placed and all results were independently analyzed by me:  Orders Placed This Encounter   Procedures    Blood Culture - Blood,    Blood Culture - Blood,    XR Chest 1 View    Pelion Draw    Comprehensive Metabolic Panel    BNP    High Sensitivity Troponin T    CBC Auto Differential    Lactic Acid, Plasma    High Sensitivity Troponin T 1Hr    NPO Diet NPO Type: Strict NPO    Undress & Gown    Continuous Pulse Oximetry    Vital Signs    IP General Consult (Use specialty-specific consult if known)    Oxygen Therapy- Nasal Cannula; Titrate 1-6 LPM Per SpO2; 90 - 95%    ECG 12 Lead ED Triage Standing Order; SOA    Insert Peripheral IV    Inpatient Admission    CBC & Differential    Green Top (Gel)    Lavender Top    Gold Top - SST    Light Blue Top       Medications Given in the Emergency Department:  Medications   sodium chloride 0.9 % flush 10 mL (has no administration in time range)   ipratropium-albuterol (DUO-NEB) nebulizer solution 3 mL (has no administration in time range)   ipratropium-albuterol (DUO-NEB) nebulizer solution 3 mL (3 mL Nebulization Given  6/10/25 2343)   ipratropium-albuterol (DUO-NEB) nebulizer solution 3 mL (3 mL Nebulization Given 6/11/25 0135)   ipratropium-albuterol (DUO-NEB) nebulizer solution 3 mL (3 mL Nebulization Given 6/11/25 0135)   methylPREDNISolone sodium succinate (SOLU-Medrol) 125 mg in sterile water (preservative free) 2 mL (125 mg Intravenous Given 6/11/25 0150)        ED Course:    The patient was initially evaluated in the triage area where orders were placed. The patient was later dispositioned by Jenna David MD.      The patient was advised to stay for completion of workup which includes but is not limited to communication of labs and radiological results, reassessment and plan. The patient was advised that leaving prior to disposition by a provider could result in critical findings that are not communicated to the patient.     ED Course as of 06/11/25 0302   Wed Jun 11, 2025   0143 ECG 12 Lead ED Triage Standing Order; SOA  Sinus tachycardia with rate of 100.  No definite ST elevation.  Normal NE and QTc.  EKG interpreted by me [LD]      ED Course User Index  [LD] Jenna David MD       Labs:    Lab Results (last 24 hours)       Procedure Component Value Units Date/Time    CBC & Differential [[id number]]  (Abnormal) Collected: 06/10/25 2212    Specimen: Blood Updated: 06/10/25 2228    Narrative:      The following orders were created for panel order CBC & Differential.  Procedure                               Abnormality         Status                     ---------                               -----------         ------                     CBC Auto Differential[[id number]]        Abnormal            Final result                 Please view results for these tests on the individual orders.    Comprehensive Metabolic Panel [[id number]]  (Abnormal) Collected: 06/10/25 2212    Specimen: Blood Updated: 06/10/25 2249     Glucose 125 mg/dL      BUN 10.4 mg/dL      Creatinine 1.00 mg/dL      Sodium 140 mmol/L       Potassium 3.5 mmol/L      Chloride 103 mmol/L      CO2 24.8 mmol/L      Calcium 9.1 mg/dL      Total Protein 7.3 g/dL      Albumin 4.1 g/dL      ALT (SGPT) 19 U/L      AST (SGOT) 19 U/L      Alkaline Phosphatase 62 U/L      Total Bilirubin 0.3 mg/dL      Globulin 3.2 gm/dL      A/G Ratio 1.3 g/dL      BUN/Creatinine Ratio 10.4     Anion Gap 12.2 mmol/L      eGFR 70.9 mL/min/1.73     Narrative:      GFR Categories in Chronic Kidney Disease (CKD)              GFR Category          GFR (mL/min/1.73)    Interpretation  G1                    90 or greater        Normal or high (1)  G2                    60-89                Mild decrease (1)  G3a                   45-59                Mild to moderate decrease  G3b                   30-44                Moderate to severe decrease  G4                    15-29                Severe decrease  G5                    14 or less           Kidney failure    (1)In the absence of evidence of kidney disease, neither GFR category G1 or G2 fulfill the criteria for CKD.    eGFR calculation 2021 CKD-EPI creatinine equation, which does not include race as a factor    BNP [[id number]]  (Normal) Collected: 06/10/25 2212    Specimen: Blood Updated: 06/10/25 2257     proBNP 151.5 pg/mL     Narrative:      This assay is used as an aid in the diagnosis of individuals suspected of having heart failure. It can be used as an aid in the diagnosis of acute decompensated heart failure (ADHF) in patients presenting with signs and symptoms of ADHF to the emergency department (ED). In addition, NT-proBNP of <300 pg/mL indicates ADHF is not likely.    Age Range Result Interpretation  NT-proBNP Concentration (pg/mL:      <50             Positive            >450                   Gray                 300-450                    Negative             <300    50-75           Positive            >900                  Gray                300-900                  Negative            <300      >75              Positive            >1800                  Gray                300-1800                  Negative            <300    High Sensitivity Troponin T [[id number]]  (Normal) Collected: 06/10/25 2212    Specimen: Blood Updated: 06/10/25 2257     HS Troponin T 6 ng/L     Narrative:      High Sensitive Troponin T Reference Range:  <14.0 ng/L- Negative Female for AMI  <22.0 ng/L- Negative Male for AMI  >=14 - Abnormal Female indicating possible myocardial injury.  >=22 - Abnormal Male indicating possible myocardial injury.   Clinicians would have to utilize clinical acumen, EKG, Troponin, and serial changes to determine if it is an Acute Myocardial Infarction or myocardial injury due to an underlying chronic condition.         CBC Auto Differential [[id number]]  (Abnormal) Collected: 06/10/25 2212    Specimen: Blood Updated: 06/10/25 2228     WBC 7.52 10*3/mm3      RBC 4.29 10*6/mm3      Hemoglobin 11.7 g/dL      Hematocrit 37.8 %      MCV 88.1 fL      MCH 27.3 pg      MCHC 31.0 g/dL      RDW 12.3 %      RDW-SD 39.6 fl      MPV 10.9 fL      Platelets 276 10*3/mm3      Neutrophil % 58.0 %      Lymphocyte % 25.3 %      Monocyte % 8.5 %      Eosinophil % 7.2 %      Basophil % 0.7 %      Immature Grans % 0.3 %      Neutrophils, Absolute 4.37 10*3/mm3      Lymphocytes, Absolute 1.90 10*3/mm3      Monocytes, Absolute 0.64 10*3/mm3      Eosinophils, Absolute 0.54 10*3/mm3      Basophils, Absolute 0.05 10*3/mm3      Immature Grans, Absolute 0.02 10*3/mm3      nRBC 0.0 /100 WBC     Blood Culture - Blood, Arm, Right [[id number]] Collected: 06/10/25 2212    Specimen: Blood from Arm, Right Updated: 06/10/25 2225    Lactic Acid, Plasma [[id number]]  (Normal) Collected: 06/10/25 2212    Specimen: Blood Updated: 06/10/25 2245     Lactate 1.3 mmol/L     Blood Culture - Blood, Arm, Right [[id number]] Collected: 06/11/25 0032    Specimen: Blood from Arm, Right Updated: 06/11/25 0036    High Sensitivity Troponin T 1Hr [[id number]] Collected: 06/11/25  0032    Specimen: Blood from Arm, Right Updated: 06/11/25 0055     HS Troponin T <6 ng/L      Troponin T Numeric Delta --     Comment: Unable to calculate.       Narrative:      High Sensitive Troponin T Reference Range:  <14.0 ng/L- Negative Female for AMI  <22.0 ng/L- Negative Male for AMI  >=14 - Abnormal Female indicating possible myocardial injury.  >=22 - Abnormal Male indicating possible myocardial injury.   Clinicians would have to utilize clinical acumen, EKG, Troponin, and serial changes to determine if it is an Acute Myocardial Infarction or myocardial injury due to an underlying chronic condition.                  Imaging:    XR Chest 1 View  Result Date: 6/10/2025  XR CHEST 1 VW Date of Exam: 6/10/2025 10:38 PM EDT Indication: SOA Triage Protocol Comparison: 1/29/2023 Findings: Heart size is top normal. Pulmonary vascularity is normal. The lungs are clear. No pneumothorax is identified.     No active disease. Electronically Signed: Moise Rasmussen MD  6/10/2025 10:48 PM EDT  Workstation ID: [id number]        Differential Diagnosis and Discussion:      Dyspnea: Differential diagnosis includes but is not limited to metabolic acidosis, neurological disorders, psychogenic, asthma, pneumothorax, upper airway obstruction, COPD, pneumonia, noncardiogenic pulmonary edema, interstitial lung disease, anemia, congestive heart failure, and pulmonary embolism    PROCEDURES:    Labs were collected in the emergency department and all labs were reviewed and interpreted by me.  X-ray were performed in the emergency department and all X-ray impressions were independently interpreted by me.  An EKG was performed and the EKG was interpreted by me.    ECG 12 Lead ED Triage Standing Order; SOA   Preliminary Result   HEART XSWF=421  bpm   RR Ayshzcxm=686  ms   NV Xvjehqwc=511  ms   P Horizontal Axis=-3  deg   P Front Axis=59  deg   QRSD Interval=91  ms   QT Rnrcciuh=893  ms   ASfP=702  ms   QRS Axis=-3  deg   T Wave Axis=49   deg   - ABNORMAL ECG -   Sinus tachycardia   Borderline prolonged FL interval   Probable left atrial enlargement   Low voltage, precordial leads   Anteroseptal infarct, age indeterminate   Date and Time of Study:2025-06-10 22:05:58           Procedures    MDM  Number of Diagnoses or Management Options  Diagnosis management comments: Patient presented to the emergency department with shortness of breath.  On exam she has diffuse wheezes.  She was given multiple nebulizer treatments with minimal improvement.  She was also given dose of Solu-Medrol.  Patient also requiring supplemental oxygen to keep O2 sats above 90.  Discussed patient with hospitalist and will admit for further care.       Amount and/or Complexity of Data Reviewed  Clinical lab tests: reviewed  Tests in the radiology section of CPT®: reviewed  Review and summarize past medical records: yes  Independent visualization of images, tracings, or specimens: yes    Risk of Complications, Morbidity, and/or Mortality  Presenting problems: moderate  Management options: moderate           Total Critical Care time of 40 minutes. Total critical care time documented does not include time spent on separately billed procedures for services of nurses or physician assistants. I personally saw and examined the patient. I have reviewed all diagnostic interpretations and treatment plans as written. I was present for the key portions of any procedures performed and the inclusive time noted in any critical care statement. Critical care time includes patient management by me, time spent at the patients bedside,  time to review lab and imaging results, discussing patient care, documentation in the medical record, and time spent with family or caregiver.          Patient Care Considerations:    SEPSIS was considered but is NOT present in the emergency department as SIRS criteria is not present.      Consultants/Shared Management Plan:    Hospitalist: I have discussed the case  with Dr Jaramillo who agrees to accept the patient for admission.    Social Determinants of Health:    Patient is independent, reliable, and has access to care.       Disposition and Care Coordination:    Admit:   Through independent evaluation of the patient's history, physical, and imperical data, the patient meets criteria for inpatient admission to the hospital.        Final diagnoses:   Moderate persistent asthma with exacerbation   Acute respiratory failure with hypoxia        ED Disposition       ED Disposition   Decision to Admit    Condition   --    Comment   Level of Care: Telemetry [5]   Diagnosis: Respiratory failure with hypoxia [[id number]]   Admitting Physician: SAGE JARAMILLO [[id number]]   Attending Physician: SAGE JARAMILLO [[id number]]   Certification: I Certify That Inpatient Hospital Services Are Medically Necessary For Greater Than 2 Midnights                 This medical record created using voice recognition software.             Jenna David MD  06/11/25 0308

## 2025-06-11 NOTE — PLAN OF CARE
Goal Outcome Evaluation:           Progress: improving  Outcome Evaluation: See admission note

## 2025-06-12 ENCOUNTER — READMISSION MANAGEMENT (OUTPATIENT)
Dept: CALL CENTER | Facility: HOSPITAL | Age: 45
End: 2025-06-12
Payer: COMMERCIAL

## 2025-06-12 VITALS
RESPIRATION RATE: 20 BRPM | TEMPERATURE: 97.9 F | BODY MASS INDEX: 53.92 KG/M2 | HEART RATE: 99 BPM | WEIGHT: 293 LBS | OXYGEN SATURATION: 95 % | SYSTOLIC BLOOD PRESSURE: 151 MMHG | DIASTOLIC BLOOD PRESSURE: 108 MMHG | HEIGHT: 62 IN

## 2025-06-12 PROBLEM — J96.91 RESPIRATORY FAILURE WITH HYPOXIA: Status: RESOLVED | Noted: 2025-06-11 | Resolved: 2025-06-12

## 2025-06-12 LAB
ALBUMIN SERPL-MCNC: 4 G/DL (ref 3.5–5.2)
ALBUMIN/GLOB SERPL: 1.2 G/DL
ALP SERPL-CCNC: 68 U/L (ref 39–117)
ALT SERPL W P-5'-P-CCNC: 19 U/L (ref 1–33)
ANION GAP SERPL CALCULATED.3IONS-SCNC: 11.9 MMOL/L (ref 5–15)
AORTIC DIMENSIONLESS INDEX: 1.1 (DI)
AST SERPL-CCNC: 15 U/L (ref 1–32)
AV MEAN PRESS GRAD SYS DOP V1V2: 8 MMHG
AV VMAX SYS DOP: 178 CM/SEC
BASOPHILS # BLD AUTO: 0.04 10*3/MM3 (ref 0–0.2)
BASOPHILS NFR BLD AUTO: 0.2 % (ref 0–1.5)
BH CV ECHO MEAS - AO MAX PG: 12.7 MMHG
BH CV ECHO MEAS - AO ROOT DIAM: 2.8 CM
BH CV ECHO MEAS - AO V2 VTI: 31.3 CM
BH CV ECHO MEAS - AVA(I,D): 3.5 CM2
BH CV ECHO MEAS - EDV(CUBED): 97.3 ML
BH CV ECHO MEAS - EDV(MOD-SP2): 47.3 ML
BH CV ECHO MEAS - EDV(MOD-SP4): 67.4 ML
BH CV ECHO MEAS - EF(MOD-SP2): 61.9 %
BH CV ECHO MEAS - EF(MOD-SP4): 56.4 %
BH CV ECHO MEAS - ESV(CUBED): 24.4 ML
BH CV ECHO MEAS - ESV(MOD-SP2): 18 ML
BH CV ECHO MEAS - ESV(MOD-SP4): 29.4 ML
BH CV ECHO MEAS - FS: 37 %
BH CV ECHO MEAS - IVS/LVPW: 1 CM
BH CV ECHO MEAS - IVSD: 1.2 CM
BH CV ECHO MEAS - LA DIMENSION: 2.9 CM
BH CV ECHO MEAS - LAT PEAK E' VEL: 3.6 CM/SEC
BH CV ECHO MEAS - LV DIASTOLIC VOL/BSA (35-75): 28.3 CM2
BH CV ECHO MEAS - LV MASS(C)D: 205 GRAMS
BH CV ECHO MEAS - LV MAX PG: 14.3 MMHG
BH CV ECHO MEAS - LV MEAN PG: 9 MMHG
BH CV ECHO MEAS - LV SYSTOLIC VOL/BSA (12-30): 12.3 CM2
BH CV ECHO MEAS - LV V1 MAX: 189 CM/SEC
BH CV ECHO MEAS - LV V1 VTI: 34.4 CM
BH CV ECHO MEAS - LVIDD: 4.6 CM
BH CV ECHO MEAS - LVIDS: 2.9 CM
BH CV ECHO MEAS - LVOT AREA: 3.1 CM2
BH CV ECHO MEAS - LVOT DIAM: 2 CM
BH CV ECHO MEAS - LVPWD: 1.2 CM
BH CV ECHO MEAS - MED PEAK E' VEL: 3.6 CM/SEC
BH CV ECHO MEAS - MV A MAX VEL: 88.5 CM/SEC
BH CV ECHO MEAS - MV DEC SLOPE: 1332 CM/SEC2
BH CV ECHO MEAS - MV DEC TIME: 0.1 SEC
BH CV ECHO MEAS - MV E MAX VEL: 139 CM/SEC
BH CV ECHO MEAS - MV E/A: 1.57
BH CV ECHO MEAS - RVDD: 2.4 CM
BH CV ECHO MEAS - SV(LVOT): 108.1 ML
BH CV ECHO MEAS - SV(MOD-SP2): 29.3 ML
BH CV ECHO MEAS - SV(MOD-SP4): 38 ML
BH CV ECHO MEAS - SVI(LVOT): 45.3 ML/M2
BH CV ECHO MEAS - SVI(MOD-SP2): 12.3 ML/M2
BH CV ECHO MEAS - SVI(MOD-SP4): 15.9 ML/M2
BH CV ECHO MEAS - TAPSE (>1.6): 1.8 CM
BH CV ECHO MEASUREMENTS AVERAGE E/E' RATIO: 38.61
BILIRUB SERPL-MCNC: 0.3 MG/DL (ref 0–1.2)
BUN SERPL-MCNC: 20 MG/DL (ref 6–20)
BUN/CREAT SERPL: 18.3 (ref 7–25)
CALCIUM SPEC-SCNC: 9.5 MG/DL (ref 8.6–10.5)
CHLORIDE SERPL-SCNC: 101 MMOL/L (ref 98–107)
CO2 SERPL-SCNC: 23.1 MMOL/L (ref 22–29)
CREAT SERPL-MCNC: 1.09 MG/DL (ref 0.57–1)
DEPRECATED RDW RBC AUTO: 40.3 FL (ref 37–54)
EGFRCR SERPLBLD CKD-EPI 2021: 64 ML/MIN/1.73
EOSINOPHIL # BLD AUTO: 0 10*3/MM3 (ref 0–0.4)
EOSINOPHIL NFR BLD AUTO: 0 % (ref 0.3–6.2)
ERYTHROCYTE [DISTWIDTH] IN BLOOD BY AUTOMATED COUNT: 12.8 % (ref 12.3–15.4)
GLOBULIN UR ELPH-MCNC: 3.4 GM/DL
GLUCOSE SERPL-MCNC: 252 MG/DL (ref 65–99)
HCT VFR BLD AUTO: 39.5 % (ref 34–46.6)
HGB BLD-MCNC: 12.2 G/DL (ref 12–15.9)
IMM GRANULOCYTES # BLD AUTO: 0.21 10*3/MM3 (ref 0–0.05)
IMM GRANULOCYTES NFR BLD AUTO: 0.9 % (ref 0–0.5)
LEFT ATRIUM VOLUME INDEX: 12.3 ML/M2
LV EF BIPLANE MOD: 58.6 %
LYMPHOCYTES # BLD AUTO: 1.25 10*3/MM3 (ref 0.7–3.1)
LYMPHOCYTES NFR BLD AUTO: 5.2 % (ref 19.6–45.3)
MCH RBC QN AUTO: 26.9 PG (ref 26.6–33)
MCHC RBC AUTO-ENTMCNC: 30.9 G/DL (ref 31.5–35.7)
MCV RBC AUTO: 87.2 FL (ref 79–97)
MONOCYTES # BLD AUTO: 0.99 10*3/MM3 (ref 0.1–0.9)
MONOCYTES NFR BLD AUTO: 4.1 % (ref 5–12)
NEUTROPHILS NFR BLD AUTO: 21.74 10*3/MM3 (ref 1.7–7)
NEUTROPHILS NFR BLD AUTO: 89.6 % (ref 42.7–76)
NRBC BLD AUTO-RTO: 0 /100 WBC (ref 0–0.2)
PLATELET # BLD AUTO: 345 10*3/MM3 (ref 140–450)
PMV BLD AUTO: 11.6 FL (ref 6–12)
POTASSIUM SERPL-SCNC: 3.9 MMOL/L (ref 3.5–5.2)
PROT SERPL-MCNC: 7.4 G/DL (ref 6–8.5)
RBC # BLD AUTO: 4.53 10*6/MM3 (ref 3.77–5.28)
SODIUM SERPL-SCNC: 136 MMOL/L (ref 136–145)
WBC NRBC COR # BLD AUTO: 24.23 10*3/MM3 (ref 3.4–10.8)

## 2025-06-12 PROCEDURE — 94760 N-INVAS EAR/PLS OXIMETRY 1: CPT

## 2025-06-12 PROCEDURE — 94618 PULMONARY STRESS TESTING: CPT

## 2025-06-12 PROCEDURE — 96372 THER/PROPH/DIAG INJ SC/IM: CPT

## 2025-06-12 PROCEDURE — 94799 UNLISTED PULMONARY SVC/PX: CPT

## 2025-06-12 PROCEDURE — 85025 COMPLETE CBC W/AUTO DIFF WBC: CPT | Performed by: INTERNAL MEDICINE

## 2025-06-12 PROCEDURE — 80053 COMPREHEN METABOLIC PANEL: CPT | Performed by: INTERNAL MEDICINE

## 2025-06-12 PROCEDURE — 25010000002 KETOROLAC TROMETHAMINE PER 15 MG: Performed by: INTERNAL MEDICINE

## 2025-06-12 PROCEDURE — 96376 TX/PRO/DX INJ SAME DRUG ADON: CPT

## 2025-06-12 PROCEDURE — 94664 DEMO&/EVAL PT USE INHALER: CPT

## 2025-06-12 PROCEDURE — 25010000002 METHYLPREDNISOLONE PER 125 MG: Performed by: INTERNAL MEDICINE

## 2025-06-12 PROCEDURE — 25010000002 ENOXAPARIN PER 10 MG: Performed by: INTERNAL MEDICINE

## 2025-06-12 RX ORDER — PREDNISONE 20 MG/1
20 TABLET ORAL DAILY
Qty: 5 TABLET | Refills: 0 | Status: SHIPPED | OUTPATIENT
Start: 2025-06-12 | End: 2025-06-17

## 2025-06-12 RX ORDER — HYDROXYZINE HYDROCHLORIDE 25 MG/1
25 TABLET, FILM COATED ORAL EVERY 8 HOURS PRN
Qty: 30 TABLET | Refills: 0 | Status: SHIPPED | OUTPATIENT
Start: 2025-06-12 | End: 2025-06-22

## 2025-06-12 RX ORDER — METOPROLOL SUCCINATE 25 MG/1
25 TABLET, EXTENDED RELEASE ORAL
Qty: 30 TABLET | Refills: 0 | Status: SHIPPED | OUTPATIENT
Start: 2025-06-13 | End: 2025-07-13

## 2025-06-12 RX ORDER — IPRATROPIUM BROMIDE AND ALBUTEROL SULFATE 2.5; .5 MG/3ML; MG/3ML
3 SOLUTION RESPIRATORY (INHALATION)
Qty: 360 ML | Refills: 0 | Status: SHIPPED | OUTPATIENT
Start: 2025-06-12 | End: 2025-07-12

## 2025-06-12 RX ADMIN — IPRATROPIUM BROMIDE AND ALBUTEROL SULFATE 3 ML: .5; 3 SOLUTION RESPIRATORY (INHALATION) at 03:19

## 2025-06-12 RX ADMIN — IPRATROPIUM BROMIDE AND ALBUTEROL SULFATE 3 ML: .5; 3 SOLUTION RESPIRATORY (INHALATION) at 06:35

## 2025-06-12 RX ADMIN — IPRATROPIUM BROMIDE AND ALBUTEROL SULFATE 3 ML: .5; 3 SOLUTION RESPIRATORY (INHALATION) at 11:11

## 2025-06-12 RX ADMIN — LOSARTAN POTASSIUM 100 MG: 50 TABLET, FILM COATED ORAL at 08:38

## 2025-06-12 RX ADMIN — HYDROCHLOROTHIAZIDE 12.5 MG: 25 TABLET ORAL at 08:35

## 2025-06-12 RX ADMIN — METOPROLOL SUCCINATE ER TABLETS 25 MG: 25 TABLET, FILM COATED, EXTENDED RELEASE ORAL at 08:35

## 2025-06-12 RX ADMIN — KETOROLAC TROMETHAMINE 15 MG: 30 INJECTION, SOLUTION INTRAMUSCULAR; INTRAVENOUS at 04:23

## 2025-06-12 RX ADMIN — Medication 10 ML: at 08:35

## 2025-06-12 RX ADMIN — PANTOPRAZOLE SODIUM 40 MG: 40 TABLET, DELAYED RELEASE ORAL at 08:35

## 2025-06-12 RX ADMIN — METHYLPREDNISOLONE SODIUM SUCCINATE 40 MG: 125 INJECTION, POWDER, LYOPHILIZED, FOR SOLUTION INTRAMUSCULAR; INTRAVENOUS at 04:24

## 2025-06-12 RX ADMIN — ENOXAPARIN SODIUM 60 MG: 100 INJECTION SUBCUTANEOUS at 04:24

## 2025-06-12 NOTE — PLAN OF CARE
Goal Outcome Evaluation:  Plan of Care Reviewed With: patient        Progress: improving  Outcome Evaluation: Patient alert and oriented x 4. Patient on room air. Walk test completed, see notes. IV removed. Discharge education completed. Continue with plan of care.

## 2025-06-12 NOTE — OUTREACH NOTE
Prep Survey      Flowsheet Row Responses   [facility] facility patient discharged from? [facility]   Is LACE score < 7 ? No   Eligibility Readm Mgmt   Discharge diagnosis Respiratory failure with hypoxia   Does the patient have one of the following disease processes/diagnoses(primary or secondary)? Other   Prep survey completed? Yes            Deja GERMAN - Registered Nurse

## 2025-06-12 NOTE — PROGRESS NOTES
Walking Oximetry Progress Note      Patient Name:  Yancy Hernandez  YOB: 1980  Date of Procedure: 06/12/25              ROOM AIR BASELINE   SpO2% 96   Heart Rate 101     EXERCISE ON ROOM AIR SpO2% EXERCISE ON O2 LPM SpO2%   1 MINUTE 94 1 MINUTE     2 MINUTES 94 2 MINUTES     3 MINUTES 92 3 MINUTES     4 MINUTES 95 4 MINUTES     5 MINUTES 94 5 MINUTES     6 MINUTES 94 6 MINUTES                Time to Recovery  N/A   SpO2% Post Exercise  94 on RA.    HR Post Exercise  120     Comments:           Electronically signed by Anali aVughn RRT, 06/12/25, 10:23 AM EDT.

## 2025-06-15 LAB — BACTERIA SPEC AEROBE CULT: NORMAL

## 2025-06-16 LAB — BACTERIA SPEC AEROBE CULT: NORMAL

## 2025-06-17 ENCOUNTER — READMISSION MANAGEMENT (OUTPATIENT)
Dept: CALL CENTER | Facility: HOSPITAL | Age: 45
End: 2025-06-17
Payer: COMMERCIAL

## 2025-06-17 NOTE — DISCHARGE SUMMARY
[facility]         DISCHARGE SUMMARY    Patient Name: Yancy Hernandez  : 1980  MRN: [id number]    Date of Admission: 2025  Date of Discharge:   Primary Care Physician: Emmy Díaz MD    Consults       Date and Time Order Name Status Description    2025  9:32 AM Inpatient Pulmonology Consult Completed             Presenting Problem:   Moderate persistent asthma with exacerbation [J45.41]  Respiratory failure with hypoxia [J96.91]  Acute respiratory failure with hypoxia [J96.01]    Active and Resolved Hospital Problems:  Active Hospital Problems    Diagnosis POA    Dyspnea [R06.00] Yes    HORTENCIA (obstructive sleep apnea) [G47.33] Yes    Obesity, morbid, BMI 50 or higher [E66.01] Yes      Resolved Hospital Problems    Diagnosis POA    **Respiratory failure with hypoxia [J96.91] Yes         Hospital Course     Hospital Course:  Yancy Hernandez is a 45 y.o. female admitted with shortness of breath.  Patient had sudden onset of shortness of breath.  Denies any chest pain or coughing up blood.  She reports she has history of COPD/asthma.  She was admitted to medical service, 2D echo showed normal ejection fraction and no diastolic dysfunction.  Patient was treated with steroids and nebs.  Pulmonologist Dr. Burgos was consulted.  Next day patient was feeling much better and wanted to go home.  His 6-minute walk test was negative for oxygen requirement.  She was able to maintain her sats above 90.  Patient was discharged home advised to lose weight, patient was advised to use inhalers and meds as prescribed.  To follow-up with PCP next week.  Patient was started on beta-blockers and anxiolytics for hypertension and anxiety at the time of discharge she was given Toprol and hydroxyzine.  Prednisone was continued for additional 5 days as an outpatient advised to follow-up with pulmonologist postdischarge for sleep apnea workup and testing.      DISCHARGE Follow Up  Recommendations for labs and diagnostics:   Discharge to home with outpatient follow-up      Day of Discharge     Vital Signs:   Reviewed at the time of discharge summary    Physical Exam:    Middle-age female morbidly obese not in acute distress.  Heart regular.  Lungs clear diminished breath sounds.  Abdomen soft and obese.  Extremities no edema.  Neuro awake alert and oriented      Pertinent  and/or Most Recent Results     LAB RESULTS:      Lab 06/12/25  0455 06/11/25  1553 06/11/25  0508 06/10/25  2212   WBC 24.23*  --  8.10 7.52   HEMOGLOBIN 12.2  --  12.1 11.7*   HEMATOCRIT 39.5  --  39.1 37.8   PLATELETS 345  --  307 276   NEUTROS ABS 21.74*  --  6.94 4.37   IMMATURE GRANS (ABS) 0.21*  --  0.04 0.02   LYMPHS ABS 1.25  --  0.85 1.90   MONOS ABS 0.99*  --  0.17 0.64   EOS ABS 0.00  --  0.06 0.54*   MCV 87.2  --  87.7 88.1   LACTATE  --   --   --  1.3   D DIMER QUANT  --  0.44  --   --          Lab 06/12/25  0455 06/11/25  0508 06/10/25  2212   SODIUM 136 137 140   POTASSIUM 3.9 3.8 3.5   CHLORIDE 101 100 103   CO2 23.1 22.4 24.8   ANION GAP 11.9 14.6 12.2   BUN 20.0 9.2 10.4   CREATININE 1.09* 0.90 1.00   EGFR 64.0 80.5 70.9   GLUCOSE 252* 188* 125*   CALCIUM 9.5 9.3 9.1         Lab 06/12/25  0455 06/11/25  0508 06/10/25  2212   TOTAL PROTEIN 7.4 7.8 7.3   ALBUMIN 4.0 4.3 4.1   GLOBULIN 3.4 3.5 3.2   ALT (SGPT) 19 21 19   AST (SGOT) 15 19 19   BILIRUBIN 0.3 0.4 0.3   ALK PHOS 68 65 62         Lab 06/11/25  0032 06/10/25  2212   PROBNP  --  151.5   HSTROP T <6 6                 Lab 06/11/25  1554   PH, ARTERIAL 7.429   PCO2, ARTERIAL 36.3   PO2 ART 76.5*   O2 SATURATION ART 95.6   FIO2 28   HCO3 ART 24.1   BASE EXCESS ART 0.1     Brief Urine Lab Results  (Last result in the past 365 days)        Color   Clarity   Blood   Leuk Est   Nitrite   Protein   CREAT   Urine HCG        06/11/25 1123               Negative             Microbiology Results (last 10 days)       Procedure Component Value - Date/Time    Blood  Culture - Blood, Arm, Right [[id number]]  (Normal) Collected: 06/11/25 0032    Lab Status: Final result Specimen: Blood from Arm, Right Updated: 06/16/25 0045     Blood Culture No growth at 5 days    Narrative:      Less than seven (7) mL's of blood was collected.  Insufficient quantity may yield false negative results.    Blood Culture - Blood, Arm, Right [[id number]]  (Normal) Collected: 06/10/25 2212    Lab Status: Final result Specimen: Blood from Arm, Right Updated: 06/15/25 2231     Blood Culture No growth at 5 days    Narrative:      Less than seven (7) mL's of blood was collected.  Insufficient quantity may yield false negative results.            PROCEDURES:    CT Chest Without Contrast Diagnostic  Result Date: 6/11/2025  Impression: Impression: 1.Linear and groundglass opacity in the posterior medial right lower lobe is favored to represent atelectasis. Interstitial pneumonia is not excluded. No airspace consolidations or suspicious pulmonary nodules. 2.Fatty infiltration of the liver. Electronically Signed: Eleno Brdaford DO  6/11/2025 9:24 PM EDT  Workstation ID: [id number]    XR Chest 1 View  Result Date: 6/10/2025  Impression: No active disease. Electronically Signed: Moise Rasmussen MD  6/10/2025 10:48 PM EDT  Workstation ID: [id number]      Results for orders placed during the hospital encounter of 09/01/22    Duplex Venous Lower Extremity - Left CAR    Interpretation Summary  · Normal left lower extremity venous duplex scan.      Results for orders placed during the hospital encounter of 09/01/22    Duplex Venous Lower Extremity - Left CAR    Interpretation Summary  · Normal left lower extremity venous duplex scan.      Results for orders placed during the hospital encounter of 06/11/25    Adult Transthoracic Echo Complete W/ Cont if Necessary Per Protocol    Interpretation Summary    Left ventricular ejection fraction appears to be 56 - 60%.    Left ventricular wall thickness is consistent with mild  concentric hypertrophy.    Left ventricular diastolic function was normal.      Labs Pending at Discharge:        Discharge Details        Discharge Medications        New Medications        Instructions Start Date   hydrOXYzine 25 MG tablet  Commonly known as: ATARAX   25 mg, Oral, Every 8 Hours PRN      ipratropium-albuterol 0.5-2.5 mg/3 ml nebulizer  Commonly known as: DUO-NEB   3 mL, Nebulization, Every 4 Hours - RT      metoprolol succinate XL 25 MG 24 hr tablet  Commonly known as: TOPROL-XL   25 mg, Oral, Every 24 Hours Scheduled      predniSONE 20 MG tablet  Commonly known as: DELTASONE   20 mg, Oral, Daily             Continue These Medications        Instructions Start Date   albuterol sulfate  (90 Base) MCG/ACT inhaler  Commonly known as: PROVENTIL HFA;VENTOLIN HFA;PROAIR HFA   2 puffs, 4 Times Daily PRN      cholecalciferol 1.25 MG (80906 UT) capsule  Commonly known as: VITAMIN D3   50,000 Units, Oral, Every 7 Days      furosemide 20 MG tablet  Commonly known as: LASIX   20 mg, Take As Directed      losartan-hydrochlorothiazide 100-12.5 MG per tablet  Commonly known as: HYZAAR   1 tablet, Daily      multivitamin with minerals tablet tablet   1 tablet, Daily      pantoprazole 40 MG EC tablet  Commonly known as: PROTONIX   40 mg, Oral, Daily      potassium chloride 10 MEQ CR tablet  Commonly known as: KLOR-CON M10   10 mEq, Daily PRN      terbinafine 1 % cream  Commonly known as: lamISIL   1 Application, 2 Times Daily      thiamine 100 MG tablet tablet  Commonly known as: VITAMIN B-1   1 tablet, Every 12 Hours Scheduled               Allergies   Allergen Reactions    Hydrocodone-Acetaminophen Hallucinations    Codeine Other (See Comments)     Cold sweats    Lisinopril Cough     Migraine- pt reports MD Jett instructed her to no longer take this         Discharge Disposition:    Home or Self Care    Diet:    Heart healthy    Discharge Activity:     Activity Instructions       Activity as Tolerated               Future Appointments   Date Time Provider Department Center   6/30/2025  8:45 AM Gunnar Ramirez MD [facility]  [facility]       Additional Instructions for the Follow-ups that You Need to Schedule       Discharge Follow-up with PCP   As directed       Currently Documented PCP:    Emmy Díaz MD    PCP Phone Number:    [phone]     Follow Up Details: Next week        Discharge Follow-up with Specified Provider: Dr. Burgos in 1 to 2 weeks   As directed      To: Dr. Burgos in 1 to 2 weeks                Time spent on Discharge including face to face service: 45 minutes.            I have dictated this note utilizing Dragon Dictation.             Please note that portions of this note were completed with a voice recognition program.             Part of this note may be an electronic transcription/translation of spoken language to printed text         using the Dragon Dictation System.       Electronically signed by Jose Jaramillo MD, 06/17/25, 4:16 PM EDT.

## 2025-06-17 NOTE — OUTREACH NOTE
Medical Week 1 Survey      Flowsheet Row Responses   [facility] patient discharged from? [facility]   Does the patient have one of the following disease processes/diagnoses(primary or secondary)? Other   Week 1 attempt successful? Yes   Call start time 1603   Call end time 1610   Discharge diagnosis Respiratory failure with hypoxia   Person spoke with today (if not patient) and relationship pt   Meds reviewed with patient/caregiver? Yes   Is the patient having any side effects they believe may be caused by any medication additions or changes? No   Does the patient have all medications ordered at discharge? Yes   Is the patient taking all medications as directed (includes completed medication regime)? Yes   Does the patient have a primary care provider?  Yes   Does the patient have an appointment with their PCP within 7 days of discharge? Yes   Comments regarding PCP Dr. Díaz, patient will see on 06/19   Has the patient kept scheduled appointments due by today? Yes   DME comments nebulizer   Psychosocial issues? No   Did the patient receive a copy of their discharge instructions? Yes   Nursing interventions Reviewed instructions with patient   What is the patient's perception of their health status since discharge? Improving   Is the patient/caregiver able to teach back the hierarchy of who to call/visit for symptoms/problems? PCP, Specialist, Home health nurse, Urgent Care, ED, 911 Yes   If the patient is a current smoker, are they able to teach back resources for cessation? Not a smoker   Week 1 call completed? Yes   Graduated Yes   Did the patient feel the follow up calls were helpful during their recovery period? Yes   Would this patient benefit from a Referral to Amb Social Work? No   Is the patient interested in additional calls from an ambulatory ? No   Wrap up additional comments Patient doing well.  Denies needs.   Call end time 1610            MANOJ BUSH - Registered Nurse

## 2025-06-24 ENCOUNTER — APPOINTMENT (OUTPATIENT)
Dept: WOMENS IMAGING | Facility: HOSPITAL | Age: 45
End: 2025-06-24
Payer: COMMERCIAL

## 2025-06-24 PROCEDURE — G0279 TOMOSYNTHESIS, MAMMO: HCPCS | Performed by: RADIOLOGY

## 2025-06-24 PROCEDURE — 77066 DX MAMMO INCL CAD BI: CPT | Performed by: RADIOLOGY

## 2025-06-24 PROCEDURE — 76642 ULTRASOUND BREAST LIMITED: CPT | Performed by: RADIOLOGY

## 2025-06-24 PROCEDURE — 77062 BREAST TOMOSYNTHESIS BI: CPT | Performed by: RADIOLOGY

## 2025-07-03 DIAGNOSIS — R92.8 BI-RADS CATEGORY 3 MAMMOGRAM RESULT: Primary | ICD-10-CM

## 2025-07-03 DIAGNOSIS — N64.52 NIPPLE DISCHARGE: ICD-10-CM

## 2025-07-30 ENCOUNTER — TELEPHONE (OUTPATIENT)
Dept: BARIATRICS/WEIGHT MGMT | Facility: CLINIC | Age: 45
End: 2025-07-30
Payer: COMMERCIAL

## 2025-07-30 NOTE — PROGRESS NOTES
[facility]     Referring Provider: MARIN Lackey     Chief complaint: nipple discharge     Subjective    HPI: Ms. Yancy Hernandez is a 46 yo woman, seen at the request of MARIN Lackey, for nipple discharge.  She reports a left nipple discharge that has been ongoing for more than a year, has been intermittent since having children but recently increased in April 2025.  This is increased when she showers or gently touches her breast.  She states that she does not squeeze her breast.  Denies any right sided nipple discharge.  Her OB/GYN sent her for bilateral diagnostic mammogram and left limited breast ultrasound on 6/24/2025, resulting in a BI-RADS 3 due to areas of duct ectasia in the left breast, likely benign however left limited breast ultrasound is recommended in 6 months.    She has a personal history of bilateral mastopexy in 2010 with Dr. Desai.   She denies any breast lumps, pain, skin changes  She denies any prior history of abnormal mammograms or breast biopsies.    She has a family history of breast cancer in her maternal grandmother.  She denies any family history of ovarian cancer.     She is alone today in the office.       Review of Systems   Constitutional:  Negative for fatigue and unexpected weight change.   Respiratory:  Positive for cough and shortness of breath.    Musculoskeletal:  Positive for arthralgias.   Neurological:  Negative for headaches.       Medications:    Current Outpatient Medications:     albuterol sulfate  (90 Base) MCG/ACT inhaler, Inhale 2 puffs 4 (Four) Times a Day As Needed., Disp: , Rfl:     azithromycin (ZITHROMAX) 250 MG tablet, , Disp: , Rfl:     benzonatate (TESSALON) 200 MG capsule, Every 8 (Eight) Hours., Disp: , Rfl:     cholecalciferol (VITAMIN D3) 1.25 MG (29459 UT) capsule, Take 1 capsule by mouth Every 7 (Seven) Days., Disp: , Rfl:     furosemide (LASIX) 20 MG tablet, Take 1 tablet by mouth Take As Directed. Pt takes q2d  prn for swelling, Disp: , Rfl:     hydrOXYzine (ATARAX) 25 MG tablet, Daily., Disp: , Rfl:     losartan-hydrochlorothiazide (HYZAAR) 100-12.5 MG per tablet, Take 1 tablet by mouth Daily., Disp: , Rfl:     multivitamin with minerals tablet tablet, Take 1 tablet by mouth Daily. HOLD PER MD CABRAL, Disp: , Rfl:     pantoprazole (PROTONIX) 40 MG EC tablet, Take 1 tablet by mouth Daily., Disp: 30 tablet, Rfl: 5    potassium chloride (KLOR-CON M10) 10 MEQ CR tablet, Take 1 tablet by mouth Daily As Needed (when she takes the lasix)., Disp: , Rfl:     predniSONE (DELTASONE) 50 MG tablet, Take 1 tablet by mouth Daily., Disp: , Rfl:     Symbicort 160-4.5 MCG/ACT inhaler, Inhale 2 puffs 2 (Two) Times a Day., Disp: , Rfl:     terbinafine (lamISIL) 1 % cream, Apply 1 Application topically to the appropriate area as directed 2 (Two) Times a Day., Disp: , Rfl:     thiamine (VITAMIN B-1) 100 MG tablet tablet, Take 1 tablet by mouth Every 12 (Twelve) Hours., Disp: , Rfl:     ipratropium-albuterol (DUO-NEB) 0.5-2.5 mg/3 ml nebulizer, Take 3 mL by nebulization Every 4 (Four) Hours for 30 days., Disp: 360 mL, Rfl: 0    metoprolol succinate XL (TOPROL-XL) 25 MG 24 hr tablet, Take 1 tablet by mouth Daily for 30 days., Disp: 30 tablet, Rfl: 0    Allergies:  Allergies   Allergen Reactions    Hydrocodone-Acetaminophen Hallucinations    Codeine Other (See Comments)     Cold sweats    Lisinopril Cough     Migraine- pt reports MD Jett instructed her to no longer take this       Medical history:  Past Medical History:   Diagnosis Date    Arthritis     Carpal tunnel syndrome     Depression     HX    Elevated triglycerides with high cholesterol     GERD (gastroesophageal reflux disease)     Helicobacter pylori infection 11/27/2015    Hiatal hernia     History of anemia     History of blood clots     LT LEG, COUPLE YEARS AGO, TOOK ELIQUIS    History of COVID-19 2020    Hyperlipidemia     Hypertension     Knee pain, bilateral     Migraine     OAB  (overactive bladder)     Obesity     Preeclampsia 2014    Sleep apnea     CPAP    Urinary tract infection     Off and on whole life    Varicella        Surgical History:  Past Surgical History:   Procedure Laterality Date    BILATERAL BREAST REDUCTION      CARPAL TUNNEL RELEASE Left     DENTAL PROCEDURE      DILATATION AND CURETTAGE      ENDOMETRIAL ABLATION      ENDOSCOPY N/A 10/18/2022    Procedure: ESOPHAGOGASTRODUODENOSCOPY WITH BIOPSY;  Surgeon: Kevin Fuentes Jr., MD;  Location:  [facility];  Service: General;  Laterality: N/A;  PRE- GERD  POST- GASTRITIS    ENDOSCOPY N/A 2025    Procedure: ESOPHAGOGASTRODUODENOSCOPY WITH BIOPSY;  Surgeon: Kevin Fuentes Jr., MD;  Location:  [facility];  Service: General;  Laterality: N/A;  PRE- GERD, H/O SLEEVE  POST- GASTRITIS, ESOPHAGITIS    GASTRIC SLEEVE LAPAROSCOPIC N/A 2022    Procedure: GASTRIC SLEEVE LAPAROSCOPIC WITH ROBOTIC, PARAESPHAGEAL HERRNIA REPAIR, LYSIS OF ADHESIONS;  Surgeon: Kevin Fuentes Jr., MD;  Location:  [facility];  Service: Robotics - DaVinci;  Laterality: N/A;    LAPAROSCOPIC CHOLECYSTECTOMY      TONSILLECTOMY      TUBAL ABDOMINAL LIGATION         Family History:  Family History   Problem Relation Age of Onset    Obesity Mother     Hypertension Mother     Hypertension Father     Sleep apnea Father     Sleep apnea Sister     Hypertension Sister     Breast cancer Maternal Grandmother     Stroke Maternal Grandmother     Prostate cancer Paternal Grandfather     Malig Hyperthermia Neg Hx        Family cancer history-relationship (age at diagnosis/age at death or current):  Breast: maternal grandmother (dx 52,  53)  Colon: Denies  Lung:paternal grandfather (dx 72,  72)  Ovarian: Denies  Pancreatic: paternal grandfather (dx 72,  72)  Prostate:paternal grandfather (dx 72,  72)  Skin: Denies    Social History:   Social History     Socioeconomic History    Marital status:      Number of children: 8   Tobacco Use    Smoking status: Never    Smokeless tobacco: Never   Vaping Use    Vaping status: Never Used   Substance and Sexual Activity    Alcohol use: Yes     Alcohol/week: 1.0 standard drink of alcohol     Types: 1 Glasses of wine per week     Comment: rare    Drug use: Never    Sexual activity: Yes     Partners: Male     Birth control/protection: Other     Comment: Spouse     Patient drinks 2 servings of caffeine per day.    Her occupation is business owner, [employer].   She lives with , 3 kids and 1 grandchild.        GYNECOLOGIC HISTORY:   . P: 8. AB: 1.  Last menstrual period: ablation   Age at menarche: 9  Age at first childbirth: 13  Lactation/How lon months total   Age at menopause: N/A  Total years of oral contraceptive use: N/A  Total years of hormone replacement therapy: N/A    Objective     Vitals:    25 1351   BP: 140/84   Pulse: 84   SpO2: 98%     ECOG 0 - Asymptomatic  General: NAD, well appearing  Psych: a&o x 3, normal mood and affect  Lymph nodes: no cervical, supraclavicular or axillary lymphadenopathy  Breast: symmetric, large, grade III ptosis, bra size 42D  Right: No visible abnormalities on inspection while seated, with arms raised or hands on hips. No masses, skin changes, or nipple abnormalities.  Left: No visible abnormalities on inspection while seated, with arms raised or hands on hips. No masses, skin changes, or nipple abnormalities. Unable to reproduce nipple discharge.     Imaging/Pathology:   2025 bilateral diagnostic mammogram with left limited breast ultrasound at [facility]  There are scattered areas of fibroglandular density.  Finding 1:  There are stable area of asymmetric breast tissues seen in both breasts.  There are stable asymmetries consistent with prior reduction mammoplasties.  No suspicious masses, suspicious microcalcifications or significant architectural distortions are identified. There has been no  significant change from the prior exams.  Finding 2:  The patient indicates non-bloody nipple discharge in the left breast.  There is no new or suspicious finding in the region of clinical concern on mammography. There has been no significant change from the prior exams.  LEFT BREAST REALTIME LIMITED BREAST ULTRASOUND  High resolution real-time ultrasound scanning was performed by the ultrasound technologist. Still images were obtained by the ultrasound technologist and submitted for radiologist review.  Finding 2:  There is an area of duct ectasia seen in the sub-areolar region of the left breast.  Mildy ectatic ducts containing debris noted. No intraductal masses are identified.  IMPRESSION:  Finding 1:  Stable areas of asymmetric breast tissue in both breasts are benign-negative.  Finding 2:  Area of duct ectasia in the left breast is probably benign. A limited breast ultrasound in 6 months is recommended. Clinical follow up is also recommended. The patient complains of nipple discharge. If the discharge is found to be persistent or from a single duct, then further evaluation by a ductogram or a breast MRI is recommended.  BI-RADS Category 3: Probably Benign    Assessment & Plan   Assessment:    Abnormal breast imaging-left breast, BIRADS 3, duct ectasia in the left breast is probably benign, limited breast ultrasound in 6 months is recommended.  Nipple discharge  Family history of breast cancer and pancreatic cancer-no history of genetic testing  Average lifetime risk of breast cancer-according to [facility] she is a lifetime risk of 6.3%, 10-year risk of 1.1% (calculated 8/1/2025)  Breast pain        Discussion:  We discussed types of nipple discharge. We discussed that physiologic discharge is usually bilateral, nonspontaneous, multi-ductal, and milky, but can be yellow, green or brown. That this can sometimes be associated with elevated prolactin levels and that elevated prolactin levels can have multiple causes  (pregnancy, pituitary, thyroid, medications, etc).  We discussed that pathologic nipple discharge usually is unilateral, spontaneous, persistent, comes from a single duct, is bloody, clear, or serosanguinous, or is associated with a palpable or radiological evident breast mass.      Discussed genetic testing today due to her family history of pancreatic cancer.  She qualifies for genetic testing  based off NCCN guidelines.  Discussed potential out-of-pocket cost for genetic testing, potential results of the genetic testing as well as confirmed she did not want to fix/change or confirm her life insurance policy before proceeding.  She will consider genetic testing.    We discussed breast pain and how it can sometimes be difficult to treat. We discussed that this is often related to hormonal changes. Caffeine and nicotine can also play a role in breast pain, and I encouraged her to continue only moderate caffeine consumption and continue avoiding nicotine. We also discussed the importance of wearing a good supportive bra. She can try wearing a sports bra during the day. She also can try sleeping in a sports bra or tight camisole. We discussed additional therapies such as vitamin E supplementation and that this may or may not be useful. We also discussed using OTC tylenol or ibuprofen and/or topical ice or heat for prn pain control.         Plan:  - Left limited breast ultrasound in December 2025, followed by exam  - Discuss genetic testing at next visit  - Advised to avoid expressing breast  - Breast pain treatments as mentioned above.    I have advised the patient to continue monthly breast self examination and advised to notify us if she develops new breast symptoms.      SCOTT Simons    I spent 30 total minutes, face-to-face, chart review and caring for Yancy today. This time includes time spent by me in the following activities: preparing for the visit, performing a medically appropriate examination  and/or evaluation, counseling and educating the patient/family/caregiver, ordering medications, tests, or procedures, documenting information in the medical record and independently interpreting results and communicating that information with the patient/family/caregiver.    CC:  MARIN Lackey Mujahid A, MD      EMR Dragon/transcription disclaimer:  Dictated using Dragon dictation

## 2025-08-01 ENCOUNTER — OFFICE VISIT (OUTPATIENT)
Dept: SURGERY | Facility: CLINIC | Age: 45
End: 2025-08-01
Payer: COMMERCIAL

## 2025-08-01 VITALS
DIASTOLIC BLOOD PRESSURE: 84 MMHG | WEIGHT: 293 LBS | OXYGEN SATURATION: 98 % | BODY MASS INDEX: 53.92 KG/M2 | HEIGHT: 62 IN | HEART RATE: 84 BPM | SYSTOLIC BLOOD PRESSURE: 140 MMHG

## 2025-08-01 DIAGNOSIS — R92.8 ABNORMAL FINDING ON BREAST IMAGING: Primary | ICD-10-CM

## 2025-08-01 DIAGNOSIS — Z80.3 FAMILY HISTORY OF BREAST CANCER: ICD-10-CM

## 2025-08-01 DIAGNOSIS — N64.4 BREAST PAIN: ICD-10-CM

## 2025-08-01 DIAGNOSIS — N64.52 NIPPLE DISCHARGE: ICD-10-CM

## 2025-08-01 RX ORDER — PREDNISONE 50 MG/1
1 TABLET ORAL DAILY
COMMUNITY
Start: 2025-06-30

## 2025-08-01 RX ORDER — BUDESONIDE AND FORMOTEROL FUMARATE DIHYDRATE 160; 4.5 UG/1; UG/1
2 AEROSOL RESPIRATORY (INHALATION) 2 TIMES DAILY
COMMUNITY
Start: 2025-07-01

## 2025-08-01 RX ORDER — BENZONATATE 200 MG/1
CAPSULE ORAL EVERY 8 HOURS SCHEDULED
COMMUNITY

## 2025-08-01 RX ORDER — HYDROXYZINE HYDROCHLORIDE 25 MG/1
TABLET, FILM COATED ORAL EVERY 24 HOURS
COMMUNITY

## 2025-08-01 RX ORDER — AZITHROMYCIN 250 MG/1
TABLET, FILM COATED ORAL
COMMUNITY
Start: 2025-06-30

## 2025-08-26 ENCOUNTER — TELEPHONE (OUTPATIENT)
Dept: BARIATRICS/WEIGHT MGMT | Facility: CLINIC | Age: 45
End: 2025-08-26
Payer: COMMERCIAL

## (undated) DEVICE — COLUMN DRAPE

## (undated) DEVICE — Device: Brand: STANDARD BOUGIE, 38FR

## (undated) DEVICE — GLV SURG SENSICARE PI PF LF 8.5 GRN STRL

## (undated) DEVICE — DEV COND GAS LAP INSUFLOW W/LUER CONN

## (undated) DEVICE — MSK PROC CURAPLEX O2 2/ADAPT 7FT

## (undated) DEVICE — ENDOPATH XCEL WITH OPTIVIEW TECHNOLOGY BLADELESS TROCARS WITH STABILITY SLEEVES: Brand: ENDOPATH XCEL OPTIVIEW

## (undated) DEVICE — SYR LUERLOK 20CC BX/50

## (undated) DEVICE — TUBING, SUCTION, 1/4" X 10', STRAIGHT: Brand: MEDLINE

## (undated) DEVICE — SENSR O2 OXIMAX FNGR A/ 18IN NONSTR

## (undated) DEVICE — ADAPT CLN BIOGUARD AIR/H2O DISP

## (undated) DEVICE — BITEBLOCK OMNI BLOC

## (undated) DEVICE — SUT SILK 0 SH 30IN K834H

## (undated) DEVICE — LAPAROSCOPIC DISSECTOR: Brand: DEROYAL

## (undated) DEVICE — KT ORCA ORCAPOD DISP STRL

## (undated) DEVICE — GLV SURG SENSICARE POLYISPRN W/ALOE PF LF 6.5 GRN STRL

## (undated) DEVICE — TROC STANDARDTROCAR FOR/TITANSGS 19MM DISP STRL

## (undated) DEVICE — IRRIGATOR BULB ASEPTO 60CC STRL

## (undated) DEVICE — REDUCER: Brand: ENDOWRIST

## (undated) DEVICE — APPL CHLORAPREP HI/LITE 26ML ORNG

## (undated) DEVICE — LN SMPL CO2 SHTRM SD STREAM W/M LUER

## (undated) DEVICE — NDL HYPO PRECISIONGLIDE REG 20G 1 1/2

## (undated) DEVICE — BLCK/BITE BLOX W/DENTL/RIM W/STRAP 54F

## (undated) DEVICE — GLV SURG SENSICARE PI MIC PF SZ8.5 LF STRL

## (undated) DEVICE — LOU GENERAL ROBOT: Brand: MEDLINE INDUSTRIES, INC.

## (undated) DEVICE — SEAL

## (undated) DEVICE — SUT VIC 0 CT1 27IN DYED J340H

## (undated) DEVICE — SOL IRR SALINE 0.9% 100ML STRL

## (undated) DEVICE — CANNULA SEAL

## (undated) DEVICE — MARKR SKIN W/RULR AND LBL

## (undated) DEVICE — GOWN,NON-REINFORCED,SIRUS,SET IN SLV,XL: Brand: MEDLINE

## (undated) DEVICE — 500ML,PRESSURE INFUSER W/STOPCOCK: Brand: MEDLINE

## (undated) DEVICE — SPNG LAP 18X18IN LF STRL PK/5

## (undated) DEVICE — FRCP BX RADJAW4 NDL 2.8 240CM LG OG BX40

## (undated) DEVICE — BLCK/BITE BLOX WO/DENTL/RIM W/STRAP 54F

## (undated) DEVICE — CONN TBG Y 5 IN 1 LF STRL

## (undated) DEVICE — SEALANT WND FIBRIN TISSEEL PREFIL/SYR/PRIMAFZ 4ML

## (undated) DEVICE — SOL NACL 0.9PCT 1000ML

## (undated) DEVICE — LAPAROVUE VISIBILITY SYSTEM LAPAROSCOPIC SOLUTIONS: Brand: LAPAROVUE

## (undated) DEVICE — ARM DRAPE

## (undated) DEVICE — GLV SURG SENSICARE PI MIC PF SZ6 LF STRL

## (undated) DEVICE — BLADELESS OBTURATOR: Brand: WECK VISTA

## (undated) DEVICE — SUT MNCRYL PLS ANTIB UD 4/0 PS2 18IN

## (undated) DEVICE — DRAPE,UTILITY,TAPE,15X26,STERILE: Brand: MEDLINE

## (undated) DEVICE — APL DUPLOSPRAYER MIS 40CM

## (undated) DEVICE — VESSEL SEALER EXTEND: Brand: ENDOWRIST